# Patient Record
Sex: FEMALE | Race: WHITE | NOT HISPANIC OR LATINO | Employment: FULL TIME | URBAN - METROPOLITAN AREA
[De-identification: names, ages, dates, MRNs, and addresses within clinical notes are randomized per-mention and may not be internally consistent; named-entity substitution may affect disease eponyms.]

---

## 2017-02-10 ENCOUNTER — OFFICE VISIT - HEALTHEAST (OUTPATIENT)
Dept: FAMILY MEDICINE | Facility: CLINIC | Age: 32
End: 2017-02-10

## 2017-02-10 DIAGNOSIS — J32.9 SINUSITIS: ICD-10-CM

## 2017-02-10 DIAGNOSIS — H66.92 LEFT OTITIS MEDIA: ICD-10-CM

## 2017-02-10 DIAGNOSIS — R07.0 THROAT PAIN: ICD-10-CM

## 2017-02-10 DIAGNOSIS — R06.02 SOB (SHORTNESS OF BREATH): ICD-10-CM

## 2017-02-10 DIAGNOSIS — R00.0 TACHYCARDIA: ICD-10-CM

## 2017-02-10 DIAGNOSIS — R05.9 COUGH: ICD-10-CM

## 2017-02-10 DIAGNOSIS — J10.1 INFLUENZA B: ICD-10-CM

## 2017-02-12 ENCOUNTER — COMMUNICATION - HEALTHEAST (OUTPATIENT)
Dept: FAMILY MEDICINE | Facility: CLINIC | Age: 32
End: 2017-02-12

## 2017-10-02 ENCOUNTER — OFFICE VISIT - HEALTHEAST (OUTPATIENT)
Dept: FAMILY MEDICINE | Facility: CLINIC | Age: 32
End: 2017-10-02

## 2017-10-02 DIAGNOSIS — K59.00 CONSTIPATION, UNSPECIFIED CONSTIPATION TYPE: ICD-10-CM

## 2017-10-02 DIAGNOSIS — R14.0 ABDOMINAL BLOATING: ICD-10-CM

## 2017-10-02 ASSESSMENT — MIFFLIN-ST. JEOR: SCORE: 2143.3

## 2017-11-24 ENCOUNTER — OFFICE VISIT - HEALTHEAST (OUTPATIENT)
Dept: FAMILY MEDICINE | Facility: CLINIC | Age: 32
End: 2017-11-24

## 2017-11-24 DIAGNOSIS — R10.11 RIGHT UPPER QUADRANT ABDOMINAL PAIN: ICD-10-CM

## 2017-11-24 ASSESSMENT — MIFFLIN-ST. JEOR: SCORE: 2188.2

## 2017-11-26 ENCOUNTER — HOSPITAL ENCOUNTER (OUTPATIENT)
Dept: ULTRASOUND IMAGING | Facility: HOSPITAL | Age: 32
Discharge: HOME | End: 2017-11-26
Attending: NURSE PRACTITIONER

## 2017-11-26 DIAGNOSIS — R10.11 RIGHT UPPER QUADRANT ABDOMINAL PAIN: ICD-10-CM

## 2017-11-28 ENCOUNTER — AMBULATORY - HEALTHEAST (OUTPATIENT)
Dept: FAMILY MEDICINE | Facility: CLINIC | Age: 32
End: 2017-11-28

## 2017-11-28 DIAGNOSIS — Z13.220 LIPID SCREENING: ICD-10-CM

## 2017-11-28 DIAGNOSIS — R10.13 ABDOMINAL PAIN, EPIGASTRIC: ICD-10-CM

## 2018-01-11 ENCOUNTER — RECORDS - HEALTHEAST (OUTPATIENT)
Dept: ADMINISTRATIVE | Facility: OTHER | Age: 33
End: 2018-01-11

## 2018-04-16 ENCOUNTER — OFFICE VISIT - HEALTHEAST (OUTPATIENT)
Dept: FAMILY MEDICINE | Facility: CLINIC | Age: 33
End: 2018-04-16

## 2018-04-16 DIAGNOSIS — M54.16 LUMBAR RADICULOPATHY: ICD-10-CM

## 2018-04-16 ASSESSMENT — MIFFLIN-ST. JEOR: SCORE: 2209.52

## 2018-04-19 ENCOUNTER — THERAPY VISIT (OUTPATIENT)
Dept: PHYSICAL THERAPY | Facility: CLINIC | Age: 33
End: 2018-04-19
Payer: COMMERCIAL

## 2018-04-19 DIAGNOSIS — M54.50 LOW BACK PAIN WITHOUT SCIATICA: Primary | ICD-10-CM

## 2018-04-19 PROCEDURE — 97110 THERAPEUTIC EXERCISES: CPT | Mod: GP | Performed by: PHYSICAL THERAPIST

## 2018-04-19 PROCEDURE — 97112 NEUROMUSCULAR REEDUCATION: CPT | Mod: GP | Performed by: PHYSICAL THERAPIST

## 2018-04-19 PROCEDURE — 97161 PT EVAL LOW COMPLEX 20 MIN: CPT | Mod: GP | Performed by: PHYSICAL THERAPIST

## 2018-04-19 NOTE — MR AVS SNAPSHOT
"              After Visit Summary   4/19/2018    Alicia Smith    MRN: [id number]           Patient Information     Date Of Birth          1985        Visit Information        Provider Department      4/19/2018 4:00 PM Ricco Culver, PT [facility]        Today's Diagnoses     Low back pain without sciatica    -  1       Follow-ups after your visit        Your next 10 appointments already scheduled     Apr 23, 2018 11:40 AM CDT   HENRRY Spine with Patt Newman PTA   [facility]    [address] [facility] [address]   [phone]            May 03, 2018  9:30 AM CDT   HENRRY Spine with Ricco Culver, PT   [facility] ([facility]  )    [address] [facility] [id number] [phone]              Who to contact     If you have questions or need follow up information about today's clinic visit or your schedule please contact [facility] directly at [phone].  Normal or non-critical lab and imaging results will be communicated to you by MyChart, letter or phone within 4 business days after the clinic has received the results. If you do not hear from us within 7 days, please contact the clinic through FIELDS CHINAhart or phone. If you have a critical or abnormal lab result, we will notify you by phone as soon as possible.  Submit refill requests through Zhou Heiya or call your pharmacy and they will forward the refill request to us. Please allow 3 business days for your refill to be completed.          Additional Information About Your Visit        MyChart Information     Zhou Heiya lets you send messages to your doctor, view your test results, renew your prescriptions, schedule appointments and more. To sign up, go to [email or web address] . Click on \"Log in\" on the left side of the screen, which will take you to the Welcome page. " "Then click on \"Sign up Now\" on the right side of the page.     You will be asked to enter the access code listed below, as well as some personal information. Please follow the directions to create your username and password.     Your access code is: [email or web address]  Expires: 2018 11:56 PM     Your access code will  in 90 days. If you need help or a new code, please call your [facility] clinic or [phone].        Care EveryWhere ID     This is your Care EveryWhere ID. This could be used by other organizations to access your [facility] medical records  [id number]         Blood Pressure from Last 3 Encounters:   11 126/88   11 157/101    Weight from Last 3 Encounters:   11 (!) 141.1 kg (311 lb)   11 (!) 140.5 kg (309 lb 12.8 oz)              We Performed the Following     HENRRY Inital Eval Report     Neuromuscular Re-Education     PT Eval, Low Complexity (87444)     Therapeutic Exercises        Primary Care Provider Office Phone # Fax #    Rosa EDOUARD Bentley [phone]       [facility] [address]  [place] [address]        Equal Access to Services     MAGDI STOVALL AH: Hadii leonora ku hadasho Soomaali, waaxda luqadaha, qaybta kaalmada adeegyada, bonnie delgado. So [facility] [phone].    ATENCIÓN: Si habla español, tiene a yousif disposición servicios gratuitos de asistencia lingüística. Llame al [phone].    We comply with applicable federal civil rights laws and [place] laws. We do not discriminate on the basis of race, color, national origin, age, disability, sex, sexual orientation, or gender identity.            Thank you!     Thank you for choosing [facility]  for your care. Our goal is always to provide you with excellent care. Hearing back from our patients is one way we can continue to improve our services. Please take a few minutes to complete the written survey that you may receive in the mail " after your visit with us. Thank you!             Your Updated Medication List - Protect others around you: Learn how to safely use, store and throw away your medicines at www.disposemymeds.org.          This list is accurate as of 4/19/18 11:59 PM.  Always use your most recent med list.                   Brand Name Dispense Instructions for use Diagnosis    azithromycin 250 MG tablet    ZITHROMAX    6 tablet    Take  by mouth. Two tablets first day, then one tablet daily for four days    Simple chronic bronchitis (H)       chlorpheniramine-HYDROcodone 10-8 MG/5ML Lqcr suspension    TUSSIONEX    120 mL    Take 5 mLs by mouth every 12 hours as needed.    Cough with sputum       CLARITIN PO      Take  by mouth.        predniSONE 20 MG tablet    DELTASONE    13 tablet    Take 1 tablet by mouth daily.    Cough with sputum       SUDAFED COUGH OR      Take  by mouth.

## 2018-04-19 NOTE — PROGRESS NOTES
[facility] Initial Evaluation  Subjective:  Patient is a 32 year old female presenting with rehab back hpi.   Alicia Smith is a 32 year old female with a lumbar condition.  Occurance: SITTING DOWN   Condition occurred: at home.  This is a recurrent and new condition  4/12/2018.    Patient reports pain:  Lumbar spine right.  Radiates to:  Gluteals right and thigh right.  Pain is described as burning and aching and is constant and reported as 8/10.  Associated symptoms:  Loss of motion/stiffness and loss of strength. Pain is the same all the time.  Symptoms are exacerbated by bending, twisting, lifting and sitting (ASCENDING STAIRS. TRANSFERS.) and relieved by activity/movement, rest and ice.  Since onset symptoms are gradually improving.  Special testing: FEB. 2016: L3-4.   Previous treatment includes chiropractic.  There was moderate improvement following previous treatment.  General health as reported by patient is good.  Pertinent medical history includes:  Overweight and history of fractures.  Medical allergies: no.  Other surgeries include:  No.  Current medications:  Anti-inflammatory and pain medication.  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  Primary job tasks include:  Prolonged standing, prolonged sitting and lifting (COMPUTER, PUSH/PULL. ).    Barriers: DONNING SOCKS.    Red flags:  None as reported by the patient.                        Objective:  System    Physical Exam    General     ROS    Assessment/Plan:    {REHAB NOTES:731341}

## 2018-04-19 NOTE — LETTER
[facility]  [address]  [facility] [address]  [phone]    2018    Re: Alicia Smith   :   1985  MRN:  [id number]   REFERRING PHYSICIAN:   Rosa Bentley  [facility]  Date of Initial Evaluation: 2018  Visits:  Rxs Used: 1  Reason for Referral:  Low back pain without sciatica  EVALUATION SUMMARY  [facility]  Subjective:  Patient is a 32 year old female presenting with rehab back hpi.   Alicia Smith is a 32 year old female with a lumbar condition.  Occurance: SITTING DOWN   Condition occurred: at home.  This is a recurrent and new condition  2018.    Patient reports pain:  Lumbar spine right.  Radiates to:  Gluteals right and thigh right.  Pain is described as burning and aching and is constant and reported as 8/10.  Associated symptoms:  Loss of motion/stiffness and loss of strength. Pain is the same all the time.  Symptoms are exacerbated by bending, twisting, lifting and sitting (ASCENDING STAIRS. TRANSFERS.) and relieved by activity/movement, rest and ice.  Since onset symptoms are gradually improving.  Special testing: FEB. 2016: L3-4.   Previous treatment includes chiropractic.  There was moderate improvement following previous treatment.  General health as reported by patient is good.  Pertinent medical history includes:  Overweight and history of fractures.  Medical allergies: no.  Other surgeries include:  No.  Current medications:  Anti-inflammatory and pain medication.  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  Primary job tasks include:  Prolonged standing, prolonged sitting and lifting (COMPUTER, PUSH/PULL. ).  Barriers: DONNING SOCKS.  Red flags:  None as reported by the patient.           Objective:  System       Lumbar/SI Evaluation    Lumbar Myotomes:    T12-L3 (Hip Flex):  Left: 5    Right:  5  L2-4 (Quads):  Left:  5    Right:  5  L4 (Ankle DF):  Left:  5    Right:  5  L5 (Great Toe Ext): Left: 5    Right: 5   S1 (Toe Raise):  Left: 5    Right: 5  Lumbar DTR's:  normal  Gennaro Lumbar Evaluation  Posture:  Sitting: good  Correction of Posture: better  Movement Loss:  Flexion (Flex): pain and min  Side [place]gan R (SG R): pain and nil  Side Glide L (SG L): nil  Test Movements:  FIS:   Repeat FIS: During: increases    EIS: During: centralizing    Repeat EIS: During: centralizing    EIL:   Repeat EIL: During: peripheralizing      Static Tests:  Lying Prone in Extension: CENTRALIZING  Conclusion: derangement   Assessment/Plan:    Patient is a 32 year old female with lumbar complaints.    Patient has the following significant findings with corresponding treatment plan.                Diagnosis 1:  LOW BACK PAIN  Pain -  hot/cold therapy, US, electric stimulation, manual therapy, splint/taping/bracing/orthotics, self management, education, directional preference exercise and home program  Decreased ROM/flexibility - manual therapy, therapeutic exercise, therapeutic activity and home program  Decreased function - therapeutic activities and home program  Impaired posture - neuro re-education, therapeutic activities and home program    Therapy Evaluation Codes:   1) History comprised of:   Personal factors that impact the plan of care:      Past/current experiences.    Comorbidity factors that impact the plan of care are:      None.     Medications impacting care: Anti-inflammatory and Pain.  2) Examination of Body Systems comprised of:   Body structures and functions that impact the plan of care:      Lumbar spine.   Activity limitations that impact the plan of care are:      Bathing, Bending, Cooking, Driving, Dressing, Lifting, Sitting, Squatting/kneeling, Standing and Working.  3) Clinical presentation characteristics are:   Stable/Uncomplicated.  4) Decision-Making    Low complexity using standardized patient  assessment instrument and/or measureable assessment of functional outcome.  Cumulative Therapy Evaluation is: Low complexity.    Previous and current functional limitations:  (See Goal Flow Sheet for this information)    Short term and Long term goals: (See Goal Flow Sheet for this information)     Communication ability:  Patient appears to be able to clearly communicate and understand verbal and written communication and follow directions correctly.  Treatment Explanation - The following has been discussed with the patient:   RX ordered/plan of care  Anticipated outcomes  Possible risks and side effects  This patient would benefit from PT intervention to resume normal activities.   Rehab potential is good.    Frequency:  1 X week, once daily  Duration:  for 6 weeks  Discharge Plan:  Achieve all LTG.  Independent in home treatment program.  Reach maximal therapeutic benefit.    Please refer to the daily flowsheet for treatment today, total treatment time and time spent performing 1:1 timed codes.     Thank you for your referral.    INQUIRIES  Therapist: Isael Culver, PT  [facility]  [address]  [place] [address]  Phone: [phone]  Fax: [phone]

## 2018-04-20 ENCOUNTER — OFFICE VISIT - HEALTHEAST (OUTPATIENT)
Dept: FAMILY MEDICINE | Facility: CLINIC | Age: 33
End: 2018-04-20

## 2018-04-20 DIAGNOSIS — R21 RASH: ICD-10-CM

## 2018-04-20 DIAGNOSIS — M54.16 LUMBAR RADICULOPATHY: ICD-10-CM

## 2018-04-20 DIAGNOSIS — Z13.220 LIPID SCREENING: ICD-10-CM

## 2018-04-20 DIAGNOSIS — R53.83 FATIGUE, UNSPECIFIED TYPE: ICD-10-CM

## 2018-04-20 LAB
CHOLEST SERPL-MCNC: 197 MG/DL
FASTING STATUS PATIENT QL REPORTED: YES
HDLC SERPL-MCNC: 55 MG/DL
LDLC SERPL CALC-MCNC: 119 MG/DL
TRIGL SERPL-MCNC: 115 MG/DL
TSH SERPL DL<=0.005 MIU/L-ACNC: 2.96 UIU/ML (ref 0.3–5)
VIT B12 SERPL-MCNC: 383 PG/ML (ref 213–816)

## 2018-04-20 ASSESSMENT — MIFFLIN-ST. JEOR: SCORE: 2183.67

## 2018-04-21 PROBLEM — M54.50 LOW BACK PAIN WITHOUT SCIATICA: Status: ACTIVE | Noted: 2018-04-21

## 2018-04-22 NOTE — PROGRESS NOTES
[facility] Initial Evaluation  Subjective:  Patient is a 32 year old female presenting with rehab back hpi.   Alicia Smith is a 32 year old female with a lumbar condition.  Occurance: SITTING DOWN   Condition occurred: at home.  This is a recurrent and new condition  4/12/2018.    Patient reports pain:  Lumbar spine right.  Radiates to:  Gluteals right and thigh right.  Pain is described as burning and aching and is constant and reported as 8/10.  Associated symptoms:  Loss of motion/stiffness and loss of strength. Pain is the same all the time.  Symptoms are exacerbated by bending, twisting, lifting and sitting (ASCENDING STAIRS. TRANSFERS.) and relieved by activity/movement, rest and ice.  Since onset symptoms are gradually improving.  Special testing: FEB. 2016: L3-4.   Previous treatment includes chiropractic.  There was moderate improvement following previous treatment.  General health as reported by patient is good.  Pertinent medical history includes:  Overweight and history of fractures.  Medical allergies: no.  Other surgeries include:  No.  Current medications:  Anti-inflammatory and pain medication.  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  Primary job tasks include:  Prolonged standing, prolonged sitting and lifting (COMPUTER, PUSH/PULL. ).    Barriers: DONNING SOCKS.    Red flags:  None as reported by the patient.                        Objective:  System         Lumbar/SI Evaluation    Lumbar Myotomes:    T12-L3 (Hip Flex):  Left: 5    Right: 5  L2-4 (Quads):  Left:  5    Right:  5  L4 (Ankle DF):  Left:  5    Right:  5  L5 (Great Toe Ext): Left: 5    Right: 5   S1 (Toe Raise):  Left: 5    Right: 5  Lumbar DTR's:  normal                                                                   Gennaro Lumbar Evaluation    Posture:  Sitting: good        Correction of Posture: better    Movement Loss:  Flexion (Flex): pain and  min    Side [place] R (SG R): pain and nil  Side Glide L (SG L): nil  Test Movements:  FIS:   Repeat FIS: During: increases    EIS: During: centralizing    Repeat EIS: During: centralizing      EIL:   Repeat EIL: During: peripheralizing        Static Tests:          Lying Prone in Extension: CENTRALIZING    Conclusion: derangement                                         ROS    Assessment/Plan:    Patient is a 32 year old female with lumbar complaints.    Patient has the following significant findings with corresponding treatment plan.                Diagnosis 1:  LOW BACK PAIN  Pain -  hot/cold therapy, US, electric stimulation, manual therapy, splint/taping/bracing/orthotics, self management, education, directional preference exercise and home program  Decreased ROM/flexibility - manual therapy, therapeutic exercise, therapeutic activity and home program  Decreased function - therapeutic activities and home program  Impaired posture - neuro re-education, therapeutic activities and home program    Therapy Evaluation Codes:   1) History comprised of:   Personal factors that impact the plan of care:      Past/current experiences.    Comorbidity factors that impact the plan of care are:      None.     Medications impacting care: Anti-inflammatory and Pain.  2) Examination of Body Systems comprised of:   Body structures and functions that impact the plan of care:      Lumbar spine.   Activity limitations that impact the plan of care are:      Bathing, Bending, Cooking, Driving, Dressing, Lifting, Sitting, Squatting/kneeling, Standing and Working.  3) Clinical presentation characteristics are:   Stable/Uncomplicated.  4) Decision-Making    Low complexity using standardized patient assessment instrument and/or measureable assessment of functional outcome.  Cumulative Therapy Evaluation is: Low complexity.    Previous and current functional limitations:  (See Goal Flow Sheet for this information)    Short term and Long term  goals: (See Goal Flow Sheet for this information)     Communication ability:  Patient appears to be able to clearly communicate and understand verbal and written communication and follow directions correctly.  Treatment Explanation - The following has been discussed with the patient:   RX ordered/plan of care  Anticipated outcomes  Possible risks and side effects  This patient would benefit from PT intervention to resume normal activities.   Rehab potential is good.    Frequency:  1 X week, once daily  Duration:  for 6 weeks  Discharge Plan:  Achieve all LTG.  Independent in home treatment program.  Reach maximal therapeutic benefit.    Please refer to the daily flowsheet for treatment today, total treatment time and time spent performing 1:1 timed codes.

## 2018-04-23 ENCOUNTER — THERAPY VISIT (OUTPATIENT)
Dept: PHYSICAL THERAPY | Facility: CLINIC | Age: 33
End: 2018-04-23
Payer: COMMERCIAL

## 2018-04-23 ENCOUNTER — AMBULATORY - HEALTHEAST (OUTPATIENT)
Dept: FAMILY MEDICINE | Facility: CLINIC | Age: 33
End: 2018-04-23

## 2018-04-23 DIAGNOSIS — M54.50 LOW BACK PAIN WITHOUT SCIATICA: ICD-10-CM

## 2018-04-23 DIAGNOSIS — E55.9 VITAMIN D DEFICIENCY: ICD-10-CM

## 2018-04-23 LAB
25(OH)D3 SERPL-MCNC: 18.2 NG/ML (ref 30–80)
25(OH)D3 SERPL-MCNC: 18.2 NG/ML (ref 30–80)
ANA SER QL: 0.4 U

## 2018-04-23 PROCEDURE — 97112 NEUROMUSCULAR REEDUCATION: CPT | Mod: GP | Performed by: PHYSICAL THERAPY ASSISTANT

## 2018-04-23 PROCEDURE — 97110 THERAPEUTIC EXERCISES: CPT | Mod: GP | Performed by: PHYSICAL THERAPY ASSISTANT

## 2018-04-27 ENCOUNTER — RECORDS - HEALTHEAST (OUTPATIENT)
Dept: ADMINISTRATIVE | Facility: OTHER | Age: 33
End: 2018-04-27

## 2018-05-03 ENCOUNTER — THERAPY VISIT (OUTPATIENT)
Dept: PHYSICAL THERAPY | Facility: CLINIC | Age: 33
End: 2018-05-03
Payer: COMMERCIAL

## 2018-05-03 DIAGNOSIS — M54.50 LOW BACK PAIN WITHOUT SCIATICA: ICD-10-CM

## 2018-05-03 PROCEDURE — 97530 THERAPEUTIC ACTIVITIES: CPT | Mod: GP | Performed by: PHYSICAL THERAPIST

## 2018-05-03 PROCEDURE — 97112 NEUROMUSCULAR REEDUCATION: CPT | Mod: GP | Performed by: PHYSICAL THERAPIST

## 2018-05-03 NOTE — LETTER
[facility]  [address]  [facility] [address]  [phone]    May 8, 2018    Re: Alicia Smith   :   1985  MRN:  [id number]   REFERRING PHYSICIAN:   Rosa Bentley  [facility]  Date of Initial Evaluation:  2018  Reason for Referral:  Low back pain without sciatica  EVALUATION SUMMARY     DISCHARGE REPORT  Progress reporting period is from 2018 to 5/3/2018.     SUBJECTIVE  Subjective: ONLY INTERMITTENT PAIN NOW.  EVERYTHING IS EASIER.    Current Pain level: 0/10   Initial Pain level: 8/10   Subjective:  HPI  Oswestry Score: 0 %                The subjective and objective information are from the last SOAP note on this patient.    OBJECTIVE  Objective: FIS: TO ANKLES PAIN FREE.       ASSESSMENT/PLAN  Updated problem list and treatment plan: Diagnosis 1:  LOW BACK PAIN  DOING WELL.   STG/LTGs have been met or progress has been made towards goals:  Yes (See Goal flow sheet completed today.)  Assessment of Progress: The patient's condition is improving.  The patient has met all of their long term goals.  Self Management Plans:  Patient has been instructed in a home treatment program.  I have re-evaluated this patient and find that the nature, scope, duration and intensity of the therapy is appropriate for the medical condition of the patient.  Alicia continues to require the following intervention to meet STG and LTG's: PT intervention is no longer required to meet STG/LTG.  We will discharge this patient from PT.    Recommendations:  This patient is ready to be discharged from therapy and continue their home treatment program.2  Please refer to the daily flowsheet for treatment today, total treatment time and time spent performing 1:1 timed codes.      Thank you for your referral.    INQUIRIES  Therapist: Isael Culver, [facility]  [address]  [place]  [address]  Phone: [phone]  Fax: [phone]

## 2018-05-03 NOTE — MR AVS SNAPSHOT
"              After Visit Summary   5/3/2018    Alicia Smith    MRN: [id number]           Patient Information     Date Of Birth          1985        Visit Information        Provider Department      5/3/2018 9:30 AM Ricco Culver PT [facility]        Today's Diagnoses     Low back pain without sciatica           Follow-ups after your visit        Who to contact     If you have questions or need follow up information about today's clinic visit or your schedule please contact [facility] directly at [phone].  Normal or non-critical lab and imaging results will be communicated to you by Docuratedhart, letter or phone within 4 business days after the clinic has received the results. If you do not hear from us within 7 days, please contact the clinic through Docuratedhart or phone. If you have a critical or abnormal lab result, we will notify you by phone as soon as possible.  Submit refill requests through [email or web address] or call your pharmacy and they will forward the refill request to us. Please allow 3 business days for your refill to be completed.          Additional Information About Your Visit        MyChart Information     [email or web address] lets you send messages to your doctor, view your test results, renew your prescriptions, schedule appointments and more. To sign up, go to [email or web address] . Click on \"Log in\" on the left side of the screen, which will take you to the Welcome page. Then click on \"Sign up Now\" on the right side of the page.     You will be asked to enter the access code listed below, as well as some personal information. Please follow the directions to create your username and password.     Your access code is: [email or web address]  Expires: 2018 11:56 PM     Your access code will  in 90 days. If you need help or a new code, please call your [facility] clinic or [phone].        Care EveryWhere ID     This is your Care EveryWhere " ID. This could be used by other organizations to access your [facility] medical records  [id number]         Blood Pressure from Last 3 Encounters:   08/30/11 126/88   08/21/11 157/101    Weight from Last 3 Encounters:   08/30/11 (!) 141.1 kg (311 lb)   08/21/11 (!) 140.5 kg (309 lb 12.8 oz)              We Performed the Following     HENRRY Progress Notes Report     Neuromuscular Re-Education     Therapeutic Activities        Primary Care Provider Office Phone # Fax #    Rosa Bentley [phone]       [facility] [address]  [facility] [address]        Equal Access to Services     RAUL STOVALL : Hadii aad ku hadasho Soomaali, waaxda luqadaha, qaybta kaalmada adeegyada, waxrasheed cobos . So [facility] [phone].    ATENCIÓN: Si habla español, tiene a yousif disposición servicios gratuitos de asistencia lingüística. [facility] [phone].    We comply with applicable federal civil rights laws and [place] laws. We do not discriminate on the basis of race, color, national origin, age, disability, sex, sexual orientation, or gender identity.            Thank you!     Thank you for choosing [facility]  for your care. Our goal is always to provide you with excellent care. Hearing back from our patients is one way we can continue to improve our services. Please take a few minutes to complete the written survey that you may receive in the mail after your visit with us. Thank you!             Your Updated Medication List - Protect others around you: Learn how to safely use, store and throw away your medicines at www.disposemymeds.org.          This list is accurate as of 5/3/18 11:59 PM.  Always use your most recent med list.                   Brand Name Dispense Instructions for use Diagnosis    azithromycin 250 MG tablet    ZITHROMAX    6 tablet    Take  by mouth. Two tablets first day, then one tablet daily for four days    Simple chronic  bronchitis (H)       chlorpheniramine-HYDROcodone 10-8 MG/5ML Lqcr suspension    TUSSIONEX    120 mL    Take 5 mLs by mouth every 12 hours as needed.    Cough with sputum       CLARITIN PO      Take  by mouth.        predniSONE 20 MG tablet    DELTASONE    13 tablet    Take 1 tablet by mouth daily.    Cough with sputum       SUDAFED COUGH OR      Take  by mouth.

## 2018-05-04 ENCOUNTER — RECORDS - HEALTHEAST (OUTPATIENT)
Dept: ADMINISTRATIVE | Facility: OTHER | Age: 33
End: 2018-05-04

## 2018-05-07 PROBLEM — M54.50 LOW BACK PAIN WITHOUT SCIATICA: Status: RESOLVED | Noted: 2018-04-21 | Resolved: 2018-05-07

## 2018-05-07 NOTE — PROGRESS NOTES
Subjective:  HPI  Oswestry Score: 0 %                 Objective:  System    Physical Exam    General     ROS    Assessment/Plan:    DISCHARGE REPORT    Progress reporting period is from 4/2/2018 to 5/3/2018.     SUBJECTIVE  Subjective: ONLY INTERMITTENT PAIN NOW.  EVERYTHING IS EASIER.    Current Pain level: 0/10   Initial Pain level: 8/10        ;   ,     The subjective and objective information are from the last SOAP note on this patient.    OBJECTIVE  Objective: FIS: TO ANKLES PAIN FREE.       ASSESSMENT/PLAN  Updated problem list and treatment plan: Diagnosis 1:  LOW BACK PAIN  DOING WELL.   STG/LTGs have been met or progress has been made towards goals:  Yes (See Goal flow sheet completed today.)  Assessment of Progress: The patient's condition is improving.  The patient has met all of their long term goals.  Self Management Plans:  Patient has been instructed in a home treatment program.  I have re-evaluated this patient and find that the nature, scope, duration and intensity of the therapy is appropriate for the medical condition of the patient.  Alicia continues to require the following intervention to meet STG and LTG's: PT intervention is no longer required to meet STG/LTG.  We will discharge this patient from PT.    Recommendations:  This patient is ready to be discharged from therapy and continue their home treatment program.2    Please refer to the daily flowsheet for treatment today, total treatment time and time spent performing 1:1 timed codes.

## 2018-05-10 ENCOUNTER — RECORDS - HEALTHEAST (OUTPATIENT)
Dept: ADMINISTRATIVE | Facility: OTHER | Age: 33
End: 2018-05-10

## 2018-08-29 ENCOUNTER — OFFICE VISIT (OUTPATIENT)
Dept: URGENT CARE | Facility: URGENT CARE | Age: 33
End: 2018-08-29
Payer: COMMERCIAL

## 2018-08-29 VITALS
HEART RATE: 94 BPM | TEMPERATURE: 98.2 F | RESPIRATION RATE: 16 BRPM | BODY MASS INDEX: 57.57 KG/M2 | WEIGHT: 293 LBS | DIASTOLIC BLOOD PRESSURE: 88 MMHG | OXYGEN SATURATION: 96 % | SYSTOLIC BLOOD PRESSURE: 138 MMHG

## 2018-08-29 DIAGNOSIS — H60.391 INFECTIVE OTITIS EXTERNA, RIGHT: Primary | ICD-10-CM

## 2018-08-29 PROCEDURE — 99203 OFFICE O/P NEW LOW 30 MIN: CPT | Performed by: FAMILY MEDICINE

## 2018-08-29 RX ORDER — NEOMYCIN SULFATE, POLYMYXIN B SULFATE AND HYDROCORTISONE 10; 3.5; 1 MG/ML; MG/ML; [USP'U]/ML
4 SUSPENSION/ DROPS AURICULAR (OTIC) 4 TIMES DAILY
Qty: 10 ML | Refills: 0 | Status: SHIPPED | OUTPATIENT
Start: 2018-08-29 | End: 2021-07-08

## 2018-08-29 RX ORDER — CHOLECALCIFEROL (VITAMIN D3) 50 MCG
1 TABLET ORAL DAILY
COMMUNITY

## 2018-08-29 ASSESSMENT — PAIN SCALES - GENERAL: PAINLEVEL: MILD PAIN (2)

## 2018-08-29 NOTE — MR AVS SNAPSHOT
After Visit Summary   8/29/2018    Alicia Smith    MRN: [id number]           Patient Information     Date Of Birth          1985        Visit Information        Provider Department      8/29/2018 11:05 AM Nakia Godinez MD [facility]        Today's Diagnoses     Infective otitis externa, right    -  1      Care Instructions      External Ear Infection (Adult)    External otitis (also called  swimmer s ear ) is an infection in the ear canal. It is often caused by bacteria or fungus. It can occur a few days after water gets trapped in the ear canal (from swimming or bathing). It can also occur after cleaning too deeply in the ear canal with a cotton swab or other object. Sometimes, hair care products get into the ear canal and cause this problem.  Symptoms can include pain, fever, itching, redness, drainage, or swelling of the ear canal. Temporary hearing loss may also occur.  Home care    Do not try to clean the ear canal. This can push pus and bacteria deeper into the canal.    Use prescribed ear drops as directed. These help reduce swelling and fight the infection. If an ear wick was placed in the ear canal, apply drops right onto the end of the wick. The wick will draw the medicine into the ear canal even if it is swollen closed.    A cotton ball may be loosely placed in the outer ear to absorb any drainage.    You may use acetaminophen or ibuprofen to control pain, unless another medicine was prescribed. Note: If you have chronic liver or kidney disease or ever had a stomach ulcer or GI bleeding, talk to your healthcare provider before taking any of these medicines.    Do not allow water to get into your ear when bathing. Also, don't swim until the infection has cleared.  Prevention    Keep your ears dry. This helps lower the risk of infection. Dry your ears with a towel or hair dryer after getting wet. Also, use ear plugs when swimming.    Do not stick any objects in  the ear to remove wax.    If you feel water trapped in your ear, use ear drops right away. You can get these drops over the counter at most drugstores. They work by removing water from the ear canal.  Follow-up care  Follow up with your healthcare provider in 1 week, or as advised.  When to seek medical advice  Call your healthcare provider right away if any of these occur:    Ear pain becomes worse or doesn t improve after 3 days of treatment    Redness or swelling of the outer ear occurs or gets worse    Headache    Painful or stiff neck    Drowsiness or confusion    Fever of 100.4 F (38 C) or higher, or as directed by your healthcare provider    Seizure  Date Last Reviewed: 10/1/2017    2006-1600 [employer]. [address] [place] [address], [place] [id number]. All rights reserved. This information is not intended as a substitute for professional medical care. Always follow your healthcare professional's instructions.                Follow-ups after your visit        Who to contact     If you have questions or need follow up information about today's clinic visit or your schedule please contact [facility] directly at [phone].  Normal or non-critical lab and imaging results will be communicated to you by [employer], letter or phone within 4 business days after the clinic has received the results. If you do not hear from us within 7 days, please contact the clinic through [employer] or phone. If you have a critical or abnormal lab result, we will notify you by phone as soon as possible.  Submit refill requests through "[employer]" or call your pharmacy and they will forward the refill request to us. Please allow 3 business days for your refill to be completed.          Additional Information About Your Visit        "[employer]" Information     "[employer]" lets you send messages to your doctor, view your test results, renew your prescriptions, schedule appointments and more. To sign up, go to  [email or web address] . Click on \"Log in\" on the left side of the screen, which will take you to the Welcome page. Then click on \"Sign up Now\" on the right side of the page.     You will be asked to enter the access code listed below, as well as some personal information. Please follow the directions to create your username and password.     Your access code is: [email or web address]  Expires: 2018 11:51 AM     Your access code will  in 90 days. If you need help or a new code, please call your [place] clinic or [phone].        Care EveryWhere ID     This is your Care EveryWhere ID. This could be used by other organizations to access your [place] medical records  [id number]        Your Vitals Were     Pulse Temperature Respirations Last Period Pulse Oximetry BMI (Body Mass Index)    94 98.2  F (36.8  C) (Oral) 16 2018 (Exact Date) 96% 57.57 kg/m2       Blood Pressure from Last 3 Encounters:   18 138/88   11 126/88   11 157/101    Weight from Last 3 Encounters:   18 (!) 338 lb (153.3 kg)   11 (!) 311 lb (141.1 kg)   11 (!) 309 lb 12.8 oz (140.5 kg)              Today, you had the following     No orders found for display         Today's Medication Changes          These changes are accurate as of 18 11:51 AM.  If you have any questions, ask your nurse or doctor.               Start taking these medicines.        Dose/Directions    neomycin-polymyxin-hydrocortisone 3.5-56329-0 otic suspension   Commonly known as:  CORTISPORIN   Used for:  Infective otitis externa, right   Started by:  Nakia Godinez MD        Dose:  4 drop   Place 4 drops into the right ear 4 times daily   Quantity:  10 mL   Refills:  0            Where to get your medicines      These medications were [place] [facility] [place] [address]  [address], [place] [address]     Phone:  [phone]     neomycin-polymyxin-hydrocortisone " 3.5-94026-1 otic suspension                Primary Care Provider Office Phone # Fax #    Rosa Bentley [phone]       [facility] [address] [facility] [address]  [facility] [address]        Equal Access to Services     MAGDI STOVALL : Hadii leonora mcqueen hadcalio Soomaali, waaxda luqadaha, qaybta kaalmada adeegyada, waxrasheed carolin hayaan gracesarah partida chandrika delgado. So [facility] [phone].    ATENCIÓN: Si habla español, tiene a yousif disposición servicios gratuitos de asistencia lingüística. Llame al [phone].    We comply with applicable federal civil rights laws and [place] laws. We do not discriminate on the basis of race, color, national origin, age, disability, sex, sexual orientation, or gender identity.            Thank you!     Thank you for choosing [facility]  for your care. Our goal is always to provide you with excellent care. Hearing back from our patients is one way we can continue to improve our services. Please take a few minutes to complete the written survey that you may receive in the mail after your visit with us. Thank you!             Your Updated Medication List - Protect others around you: Learn how to safely use, store and throw away your medicines at www.disposemymeds.org.          This list is accurate as of 8/29/18 11:51 AM.  Always use your most recent med list.                   Brand Name Dispense Instructions for use Diagnosis    azithromycin 250 MG tablet    ZITHROMAX    6 tablet    Take  by mouth. Two tablets first day, then one tablet daily for four days    Simple chronic bronchitis (H)       chlorpheniramine-HYDROcodone 10-8 MG/5ML Lqcr suspension    TUSSIONEX    120 mL    Take 5 mLs by mouth every 12 hours as needed.    Cough with sputum       CLARITIN PO      Take  by mouth.        neomycin-polymyxin-hydrocortisone 3.5-90329-1 otic suspension    CORTISPORIN    10 mL    Place 4 drops into the right ear 4 times daily    Infective otitis externa, right        predniSONE 20 MG tablet    DELTASONE    13 tablet    Take 1 tablet by mouth daily.    Cough with sputum       SUDAFED COUGH OR      Take  by mouth.        vitamin D 2000 units tablet      Take 1 tablet by mouth daily

## 2018-08-29 NOTE — PROGRESS NOTES
SUBJECTIVE:  Chief Complaint   Patient presents with     Otalgia     Right ear pain x1 week     Alicia Smith is a 32 year old female who presents with right ear pain, pressure and blockage for 1 week(s).   Severity: mild   2/10  Timing:gradual onset, still present and constant  Additional symptoms include none.      History of recurrent otitis: no    History reviewed. No pertinent past medical history.  Patient Active Problem List   Diagnosis   (none) - all problems resolved or deleted       ALLERGIES:  Review of patient's allergies indicates no known allergies.      Current Outpatient Prescriptions on File Prior to Visit:  azithromycin (ZITHROMAX) 250 MG tablet Take  by mouth. Two tablets first day, then one tablet daily for four days (Patient not taking: Reported on 8/29/2018)   chlorpheniramine-hydrocodone (TUSSIONEX) 10-8 MG/5ML LQCR Take 5 mLs by mouth every 12 hours as needed. (Patient not taking: Reported on 8/29/2018)   Loratadine (CLARITIN PO) Take  by mouth.   predniSONE (DELTASONE) 20 MG tablet Take 1 tablet by mouth daily. (Patient not taking: Reported on 8/29/2018)   Pseudoephedrine-DM-GG (SUDAFED COUGH OR) Take  by mouth.     No current facility-administered medications on file prior to visit.     Social History   Substance Use Topics     Smoking status: Never Smoker     Smokeless tobacco: Never Used     Alcohol use Yes      Comment: rarely       History reviewed. No pertinent family history.      ROS:   CONSTITUTIONAL:NEGATIVE for fever, chills,   INTEGUMENTARY/SKIN: NEGATIVE for worrisome rashes,   EYES: NEGATIVE for vision changes or irritation  RESP:NEGATIVE for significant cough or SOB    OBJECTIVE:  /88 (BP Location: Left arm, Patient Position: Chair, Cuff Size: Adult Large)  Pulse 94  Temp 98.2  F (36.8  C) (Oral)  Resp 16  Wt (!) 338 lb (153.3 kg)  LMP 08/14/2018 (Exact Date)  SpO2 96%  BMI 57.57 kg/m2   EXAM:  The right TM is normal: no effusions, no erythema, and normal landmarks      The right auditory canal is obstructed by drainage, swollen and tender  The left TM is normal: no effusions, no erythema, and normal landmarks  The left auditory canal is normal and without drainage, edema or erythema  Oropharynx exam is normal: no lesions, erythema, adenopathy or exudate.  GENERAL: no acute distress  EYES: EOMI,  PERRL, conjunctiva clear  NECK: supple, non-tender to palpation, no adenopathy noted  RESP: lungs clear to auscultation - no rales, rhonchi or wheezes  CV: regular rates and rhythm, normal S1 S2, no murmur noted  SKIN: no suspicious lesions or rashes     ASSESSMENT/ PLAN  Infective otitis externa, right     - neomycin-polymyxin-hydrocortisone (CORTISPORIN) 3.5-36776-9 otic suspension; Place 4 drops into the right ear 4 times daily     Symptomatic relief of pain and fever with acetaminophen and/or ibuprofen   May apply a warm pack to the region of the ear for symptomatic relief

## 2019-01-31 ENCOUNTER — OFFICE VISIT - HEALTHEAST (OUTPATIENT)
Dept: FAMILY MEDICINE | Facility: CLINIC | Age: 34
End: 2019-01-31

## 2019-01-31 DIAGNOSIS — H66.002 ACUTE SUPPURATIVE OTITIS MEDIA OF LEFT EAR WITHOUT SPONTANEOUS RUPTURE OF TYMPANIC MEMBRANE, RECURRENCE NOT SPECIFIED: ICD-10-CM

## 2019-01-31 DIAGNOSIS — R21 RASH: ICD-10-CM

## 2019-01-31 ASSESSMENT — MIFFLIN-ST. JEOR: SCORE: 2183.21

## 2019-02-12 ENCOUNTER — COMMUNICATION - HEALTHEAST (OUTPATIENT)
Dept: FAMILY MEDICINE | Facility: CLINIC | Age: 34
End: 2019-02-12

## 2019-03-17 ENCOUNTER — COMMUNICATION - HEALTHEAST (OUTPATIENT)
Dept: FAMILY MEDICINE | Facility: CLINIC | Age: 34
End: 2019-03-17

## 2019-03-18 ENCOUNTER — AMBULATORY - HEALTHEAST (OUTPATIENT)
Dept: FAMILY MEDICINE | Facility: CLINIC | Age: 34
End: 2019-03-18

## 2019-09-20 ENCOUNTER — COMMUNICATION - HEALTHEAST (OUTPATIENT)
Dept: FAMILY MEDICINE | Facility: CLINIC | Age: 34
End: 2019-09-20

## 2020-01-10 ENCOUNTER — OFFICE VISIT - HEALTHEAST (OUTPATIENT)
Dept: FAMILY MEDICINE | Facility: CLINIC | Age: 35
End: 2020-01-10

## 2020-01-10 DIAGNOSIS — R79.82 ELEVATED C-REACTIVE PROTEIN (CRP): ICD-10-CM

## 2020-01-10 DIAGNOSIS — E66.01 MORBID OBESITY (H): ICD-10-CM

## 2020-01-10 DIAGNOSIS — R07.89 ATYPICAL CHEST PAIN: ICD-10-CM

## 2020-01-10 LAB
C REACTIVE PROTEIN LHE: 2.2 MG/DL (ref 0–0.8)
ERYTHROCYTE [DISTWIDTH] IN BLOOD BY AUTOMATED COUNT: 12.1 % (ref 11–14.5)
ERYTHROCYTE [SEDIMENTATION RATE] IN BLOOD BY WESTERGREN METHOD: 33 MM/HR (ref 0–20)
HCT VFR BLD AUTO: 43.9 % (ref 35–47)
HGB BLD-MCNC: 14.8 G/DL (ref 12–16)
MCH RBC QN AUTO: 30.7 PG (ref 27–34)
MCHC RBC AUTO-ENTMCNC: 33.6 G/DL (ref 32–36)
MCV RBC AUTO: 91 FL (ref 80–100)
PLATELET # BLD AUTO: 387 THOU/UL (ref 140–440)
PMV BLD AUTO: 7.2 FL (ref 7–10)
RBC # BLD AUTO: 4.82 MILL/UL (ref 3.8–5.4)
WBC: 13.5 THOU/UL (ref 4–11)

## 2020-01-10 ASSESSMENT — MIFFLIN-ST. JEOR: SCORE: 2204.08

## 2020-01-13 ENCOUNTER — COMMUNICATION - HEALTHEAST (OUTPATIENT)
Dept: FAMILY MEDICINE | Facility: CLINIC | Age: 35
End: 2020-01-13

## 2020-03-17 ENCOUNTER — VIRTUAL VISIT (OUTPATIENT)
Dept: FAMILY MEDICINE | Facility: OTHER | Age: 35
End: 2020-03-17

## 2020-03-17 NOTE — PROGRESS NOTES
"Date: 2020 10:46:12  Clinician: Nisha Cox  Clinician NPI: [id number]  Patient: Alicia Smith  Patient : 1985  Patient Address: [address] [place] [address] [place] [address]  Patient Phone: [phone]  Visit Protocol: URI  Patient Summary:  Alicia is a 34 year old ( : 1985 ) female who initiated a Visit for COVID-19 (Coronavirus) evaluation and screening. When asked the question \"Please sign me up to receive news, health information and promotions from [employer].\", Alicia responded \"No\".    Alicia states her symptoms started 1-2 days ago.   Her symptoms consist of a sore throat, a cough, chills, malaise, enlarged lymph nodes, facial pain or pressure, and myalgia. She is experiencing mild difficulty breathing with activities but can speak normally in full sentences. Alicia also feels feverish.   Symptom details     Cough: Alicia coughs a few times an hour and her cough is not more bothersome at night. Phlegm does not come into her throat when she coughs. She does not believe her cough is caused by post-nasal drip.     Sore throat: Alicia reports having moderate throat pain (4-6 on a 10 point pain scale), does not have exudate on her tonsils, and can swallow liquids. The lymph nodes in her neck are enlarged. A rash has not appeared on the skin since the sore throat started.     Temperature: Her current temperature is 99.5 degrees Fahrenheit.     Facial pain or pressure: The facial pain or pressure feels worse when bending over or leaning forward.      Alicia denies having wheezing, nasal congestion, teeth pain, ear pain, headache, and rhinitis. She also denies taking antibiotic medication for the symptoms and having recent facial or sinus surgery in the past 60 days.   Precipitating events  Within the past week, Alicia has not been exposed to someone with strep throat. She has not recently been exposed to someone with influenza. Alicia has been in close contact with the following high risk " individuals: immunocompromised people, adults 65 or older, and people with asthma, heart disease or diabetes.   Pertinent COVID-19 (Coronavirus) information  Alicia has traveled internationally or to the areas where COVID-19 (Coronavirus) is widespread in the last 14 days before the start of her symptoms. Countries or locations traveled as reported by the patient (free text): [place] 2 stops, [place], [place], was on a cruise   Alicia has not had a close contact with a laboratory-confirmed COVID-19 patient within 14 days of symptom onset. She also has not had a close contact with a suspected COVID-19 patient within 14 days of symptom onset.   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   Pertinent medical history  Alicia had 1 sinus infection within the past year.   Alicia does not get yeast infections when she takes antibiotics.   Alicia does not need a return to work/school note.   Weight: 335 lbs   Alicia does not smoke or use smokeless tobacco.   She denies pregnancy and denies breastfeeding. She has menstruated in the past month.   Weight: 335 lbs  A synchronous phone visit was initiated by the provider for the following reason: breathing    MEDICATIONS: loratadine oral, Sudafed 12 Hour oral, Tums Extra Strength Smoothies oral, ALLERGIES: NKDA  Clinician Response:  Dear Alicia,   Based on the information you have provided, you do have symptoms that are consistent with Coronavirus (COVID-19).  The coronavirus causes mild to severe respiratory illness with the most common symptoms including fever, cough and difficulty breathing. Unfortunately, many viruses cause similar symptoms and it can be difficult to distinguish between viruses, especially in mild cases, so we are presuming that anyone with cough or fever has coronavirus at this time.  Coronavirus/COVID-19 has reached the point of community spread in [place], meaning that we are finding the virus in people with no known exposure  risk for alexi the virus. Given the increasing commonness of coronavirus in the community we are no longer testing patients who are not critically ill.  For everyone else who has cough or fever, you should assume you are infected with coronavirus. Accordingly, you should self-quarantine for fourteen days from the first day your symptoms started. You should call if you find increasing shortness of breath, wheezing or sustained fever above 101.5. If you are significantly short of breath or experience chest pain you should call 911 or report to the nearest emergency department for urgent evaluation.    Isolate yourself at home.   Do Not allow any visitors  Do Not go to work or school  Do Not go to [employer],  centers, shopping, or other public places.  Do Not shake hands.  Avoid close contact with others (hugging, kissing).   Protect Others:    Cover Your Mouth and Nose with a mask, disposable tissue or wash cloth to avoid spreading germs to others.  Wash your hands and face frequently with soap and water.   If you develop significant shortness of breath that prevents you from doing normal activities, please call 911 or proceed to the nearest emergency room and alert them immediately that you have been in self-isolation for possible coronavirus.   For more information about COVID19 and options for caring for yourself at home, please visit the CDC website at https://www.cdc.gov/coronavirus/2019-ncov/about/steps-when-sick.htmlFor more options for care at [facility], please visit our website at [email or web address]     Diagnosis: Cough  Diagnosis ICD: R05  Triage Notes: I reviewed the patient's history, verified their identity, and explained the Visit process.    Able to do most ADL's without difficulty. Advised to do [facility] or call if symptoms worsen. ED presentation discussed if emergent. Speaking normally on phone.  Synchronous Triage: phone, status: completed, duration:  147 seconds

## 2020-03-23 ENCOUNTER — OFFICE VISIT (OUTPATIENT)
Dept: URGENT CARE | Facility: URGENT CARE | Age: 35
End: 2020-03-23
Payer: COMMERCIAL

## 2020-03-23 ENCOUNTER — RESULTS ONLY (OUTPATIENT)
Dept: LAB | Age: 35
End: 2020-03-23

## 2020-03-23 DIAGNOSIS — Z20.822 SUSPECTED COVID-19 VIRUS INFECTION: Primary | ICD-10-CM

## 2020-03-23 PROCEDURE — 99213 OFFICE O/P EST LOW 20 MIN: CPT

## 2020-03-23 NOTE — PROGRESS NOTES
Chief Complaint:    COVID-19 evaluation    HPI: Alicia Smith is an 34 year old female who has been triaged via employee [facility] for possible COVID-19 testing.  Patient was not examined in clinic today.      Patient remained in their car during collection process.  Droplet precautions + contact precautions + eye protection were in effect during collection process.  PPE included gown, surgical mask, face shield, and gloves.    Patient Assessment    Patient is stable and in no respiratory distress.  Patient does not appear toxic.    Medical Decision Making:    Patient does meet criteria for COVID testing per [facility].     PLAN:    No results found for any visits on 03/23/20.     Covid-19 NP swab collected.  These will be sent to the [facility] by lab staff.    Patient advised to stay home with mild symptoms and follow home care symptom management.    Instructions Given to Patient    Isolate Yourself:    Isolate yourself while traveling.    Do Not allow any visitors within 6 feet.    Do Not go to work or school.    Do Not go to [employer],  centers, shopping, or other public places.    Do Not shake hands.    Avoid close contact with others (hugging, kissing).    Protect Others:    Cover Your Mouth and Nose with a mask, disposable tissue or wash cloth to avoid spreading germs to others.    Wash your hands and face frequently with soap and water    Fever Medicines:    For fever relief, take acetaminophen or ibuprofen.    Treat fevers above 101  F (38.3  C) to lower fevers and make you more comfortable.     Acetaminophen (e.g., Tylenol): Take 650 mg (two 325 mg pills) by mouth every 4-6 hours as needed of regular strength Tylenol or 1,000 mg (two 500 mg pills) every 8 hours as needed of Extra Strength Tylenol.     Ibuprofen (e.g., Motrin, Advil): Take 400 mg (two 200 mg pills) by mouth every 6 hours as needed.     Acetaminophen is thought to be safer than ibuprofen or naproxen for people over 65 years old. Acetaminophen is in  many OTC and prescription medicines. It might be in more than one medicine that you are taking. You need to be careful and not take an overdose. Before taking any medicine, read all the instructions on the package.    Caution -NSAIDs (e.g., ibuprofen, naproxen): Do not take nonsteroidal anti-inflammatory drugs (NSAIDs) if you have stomach problems, kidney disease, heart failure, or other contraindications to using this type of medicine. Do not take NSAID medicines for over 7 days without consulting your PCP. Do not take NSAID medicines if you are pregnant. Do not take NSAID medicines if you are also taking blood thinners.     Thank you for limiting contact with others, wearing a simple mask to cover your cough, practice good hand hygiene habits and accessing our virtual services where possible to limit the spread of this virus.    For more information about COVID19 and options for caring for yourself at home, please visit the CDC website at https://www.cdc.gov/coronavirus/2019-ncov/about/steps-when-sick.html  For more options for care at [facility], please visit our website at [email or web address]        Goldie Tolbert CNP 03/23/2012:19 PM

## 2020-03-23 NOTE — PATIENT INSTRUCTIONS
Please use the information at the end of this document to sign up for [facility] [employer] Innovationshart where you can get your results and a message about those results sent to you through the [employer] application. If you do not have mychart we will call you with your results but it may take longer.    Regardless of if you have been tested or not:  Patient who have symptoms (cough, fever, or shortness of breath), need to isolate for 7 days from when symptoms started OR 72 hours after fever resolves (without fever reducing medications) AND improvement of respiratory symptoms (whichever is longer).      Isolate yourself at home (in own room/own bathroom if possible)    Do Not allow any visitors    Do Not go to work or school    Do Not go to [employer],  centers, shopping, or other public places.    Do Not shake hands.    Avoid close and intimate contact with others (hugging, kissing).    Follow CDC recommendations for household cleaning of frequently touched services.     After the initial 7 days, continue to isolate yourself from household members as much as possible. To continue decrease the risk of community spread and exposure, you and any members of your household should limit activities in public for 14 days after starting home isolation.     You can reference the following CDC link for helpful home isolation/care tips:  https://www.cdc.gov/coronavirus/2019-ncov/downloads/10Things.pdf    Protect Others:    Cover Your Mouth and Nose with a mask, disposable tissue or wash cloth to avoid spreading germs to others.    Wash your hands and face frequently with soap and water    Call Back If: Breathing difficulty develops or you become worse.    For more information about COVID19 and options for caring for yourself at home, please visit the CDC website at https://www.cdc.gov/coronavirus/2019-ncov/about/steps-when-sick.html  For more options for care at [facility], please visit our website at  [email or web address]

## 2020-03-26 LAB
COVID-19 VIRUS PCR TO MAYO - RESULT: NORMAL
SPECIMEN SOURCE: NORMAL

## 2020-09-14 ENCOUNTER — COMMUNICATION - HEALTHEAST (OUTPATIENT)
Dept: FAMILY MEDICINE | Facility: CLINIC | Age: 35
End: 2020-09-14

## 2020-09-14 ENCOUNTER — OFFICE VISIT - HEALTHEAST (OUTPATIENT)
Dept: FAMILY MEDICINE | Facility: CLINIC | Age: 35
End: 2020-09-14

## 2020-09-14 DIAGNOSIS — M79.645 PAIN OF LEFT THUMB: ICD-10-CM

## 2020-09-14 DIAGNOSIS — M54.31 BILATERAL SCIATICA: ICD-10-CM

## 2020-09-14 DIAGNOSIS — M54.32 BILATERAL SCIATICA: ICD-10-CM

## 2020-09-14 DIAGNOSIS — Z00.00 HEALTH CARE MAINTENANCE: ICD-10-CM

## 2020-09-14 DIAGNOSIS — L40.9 PSORIASIS: ICD-10-CM

## 2020-09-23 ENCOUNTER — RECORDS - HEALTHEAST (OUTPATIENT)
Dept: ADMINISTRATIVE | Facility: OTHER | Age: 35
End: 2020-09-23

## 2020-09-24 ENCOUNTER — HOSPITAL ENCOUNTER (OUTPATIENT)
Dept: PHYSICAL MEDICINE AND REHAB | Facility: CLINIC | Age: 35
Discharge: HOME | End: 2020-09-24
Attending: NURSE PRACTITIONER

## 2020-09-24 DIAGNOSIS — M54.41 CHRONIC BILATERAL LOW BACK PAIN WITH BILATERAL SCIATICA: ICD-10-CM

## 2020-09-24 DIAGNOSIS — M54.42 CHRONIC BILATERAL LOW BACK PAIN WITH BILATERAL SCIATICA: ICD-10-CM

## 2020-09-24 DIAGNOSIS — G89.29 CHRONIC BILATERAL LOW BACK PAIN WITH BILATERAL SCIATICA: ICD-10-CM

## 2020-09-24 ASSESSMENT — MIFFLIN-ST. JEOR: SCORE: 2192.29

## 2020-09-30 ENCOUNTER — HOSPITAL ENCOUNTER (OUTPATIENT)
Dept: MRI IMAGING | Facility: HOSPITAL | Age: 35
Discharge: HOME | End: 2020-09-30
Attending: PHYSICIAN ASSISTANT

## 2020-09-30 ENCOUNTER — COMMUNICATION - HEALTHEAST (OUTPATIENT)
Dept: PHYSICAL MEDICINE AND REHAB | Facility: CLINIC | Age: 35
End: 2020-09-30

## 2020-09-30 DIAGNOSIS — G89.29 CHRONIC BILATERAL LOW BACK PAIN WITH BILATERAL SCIATICA: ICD-10-CM

## 2020-09-30 DIAGNOSIS — M54.41 CHRONIC BILATERAL LOW BACK PAIN WITH BILATERAL SCIATICA: ICD-10-CM

## 2020-09-30 DIAGNOSIS — M54.16 LUMBAR RADICULITIS: ICD-10-CM

## 2020-09-30 DIAGNOSIS — M54.42 CHRONIC BILATERAL LOW BACK PAIN WITH BILATERAL SCIATICA: ICD-10-CM

## 2020-10-02 ENCOUNTER — COMMUNICATION - HEALTHEAST (OUTPATIENT)
Dept: PHYSICAL MEDICINE AND REHAB | Facility: CLINIC | Age: 35
End: 2020-10-02

## 2020-10-02 DIAGNOSIS — G89.29 CHRONIC BILATERAL LOW BACK PAIN WITH BILATERAL SCIATICA: ICD-10-CM

## 2020-10-02 DIAGNOSIS — M54.41 CHRONIC BILATERAL LOW BACK PAIN WITH BILATERAL SCIATICA: ICD-10-CM

## 2020-10-02 DIAGNOSIS — M54.42 CHRONIC BILATERAL LOW BACK PAIN WITH BILATERAL SCIATICA: ICD-10-CM

## 2020-10-19 ENCOUNTER — OFFICE VISIT - HEALTHEAST (OUTPATIENT)
Dept: PHYSICAL THERAPY | Facility: CLINIC | Age: 35
End: 2020-10-19

## 2020-10-19 DIAGNOSIS — M54.41 CHRONIC MIDLINE LOW BACK PAIN WITH BILATERAL SCIATICA: ICD-10-CM

## 2020-10-19 DIAGNOSIS — G89.29 CHRONIC MIDLINE LOW BACK PAIN WITH BILATERAL SCIATICA: ICD-10-CM

## 2020-10-19 DIAGNOSIS — M54.42 CHRONIC MIDLINE LOW BACK PAIN WITH BILATERAL SCIATICA: ICD-10-CM

## 2020-10-20 ENCOUNTER — RECORDS - HEALTHEAST (OUTPATIENT)
Dept: ADMINISTRATIVE | Facility: OTHER | Age: 35
End: 2020-10-20

## 2020-10-22 ENCOUNTER — OFFICE VISIT - HEALTHEAST (OUTPATIENT)
Dept: PHYSICAL THERAPY | Facility: CLINIC | Age: 35
End: 2020-10-22

## 2020-10-22 DIAGNOSIS — M54.42 CHRONIC MIDLINE LOW BACK PAIN WITH BILATERAL SCIATICA: ICD-10-CM

## 2020-10-22 DIAGNOSIS — M54.41 CHRONIC MIDLINE LOW BACK PAIN WITH BILATERAL SCIATICA: ICD-10-CM

## 2020-10-22 DIAGNOSIS — G89.29 CHRONIC MIDLINE LOW BACK PAIN WITH BILATERAL SCIATICA: ICD-10-CM

## 2020-10-26 ENCOUNTER — OFFICE VISIT - HEALTHEAST (OUTPATIENT)
Dept: PHYSICAL THERAPY | Facility: CLINIC | Age: 35
End: 2020-10-26

## 2020-10-26 DIAGNOSIS — M54.42 CHRONIC MIDLINE LOW BACK PAIN WITH BILATERAL SCIATICA: ICD-10-CM

## 2020-10-26 DIAGNOSIS — M54.41 CHRONIC MIDLINE LOW BACK PAIN WITH BILATERAL SCIATICA: ICD-10-CM

## 2020-10-26 DIAGNOSIS — G89.29 CHRONIC MIDLINE LOW BACK PAIN WITH BILATERAL SCIATICA: ICD-10-CM

## 2020-10-29 ENCOUNTER — OFFICE VISIT - HEALTHEAST (OUTPATIENT)
Dept: PHYSICAL THERAPY | Facility: CLINIC | Age: 35
End: 2020-10-29

## 2020-10-29 DIAGNOSIS — G89.29 CHRONIC MIDLINE LOW BACK PAIN WITH BILATERAL SCIATICA: ICD-10-CM

## 2020-10-29 DIAGNOSIS — M54.42 CHRONIC MIDLINE LOW BACK PAIN WITH BILATERAL SCIATICA: ICD-10-CM

## 2020-10-29 DIAGNOSIS — M54.41 CHRONIC MIDLINE LOW BACK PAIN WITH BILATERAL SCIATICA: ICD-10-CM

## 2020-11-02 ENCOUNTER — OFFICE VISIT - HEALTHEAST (OUTPATIENT)
Dept: PHYSICAL THERAPY | Facility: CLINIC | Age: 35
End: 2020-11-02

## 2020-11-02 DIAGNOSIS — M54.42 CHRONIC MIDLINE LOW BACK PAIN WITH BILATERAL SCIATICA: ICD-10-CM

## 2020-11-02 DIAGNOSIS — G89.29 CHRONIC MIDLINE LOW BACK PAIN WITH BILATERAL SCIATICA: ICD-10-CM

## 2020-11-02 DIAGNOSIS — M54.41 CHRONIC MIDLINE LOW BACK PAIN WITH BILATERAL SCIATICA: ICD-10-CM

## 2020-11-05 ENCOUNTER — OFFICE VISIT - HEALTHEAST (OUTPATIENT)
Dept: PHYSICAL THERAPY | Facility: CLINIC | Age: 35
End: 2020-11-05

## 2020-11-05 DIAGNOSIS — M54.41 CHRONIC MIDLINE LOW BACK PAIN WITH BILATERAL SCIATICA: ICD-10-CM

## 2020-11-05 DIAGNOSIS — G89.29 CHRONIC MIDLINE LOW BACK PAIN WITH BILATERAL SCIATICA: ICD-10-CM

## 2020-11-05 DIAGNOSIS — M54.42 CHRONIC MIDLINE LOW BACK PAIN WITH BILATERAL SCIATICA: ICD-10-CM

## 2020-11-09 ENCOUNTER — OFFICE VISIT - HEALTHEAST (OUTPATIENT)
Dept: PHYSICAL THERAPY | Facility: CLINIC | Age: 35
End: 2020-11-09

## 2020-11-09 DIAGNOSIS — M54.41 CHRONIC MIDLINE LOW BACK PAIN WITH BILATERAL SCIATICA: ICD-10-CM

## 2020-11-09 DIAGNOSIS — G89.29 CHRONIC MIDLINE LOW BACK PAIN WITH BILATERAL SCIATICA: ICD-10-CM

## 2020-11-09 DIAGNOSIS — M54.42 CHRONIC MIDLINE LOW BACK PAIN WITH BILATERAL SCIATICA: ICD-10-CM

## 2020-11-12 ENCOUNTER — OFFICE VISIT - HEALTHEAST (OUTPATIENT)
Dept: PHYSICAL THERAPY | Facility: CLINIC | Age: 35
End: 2020-11-12

## 2020-11-12 DIAGNOSIS — M54.42 CHRONIC MIDLINE LOW BACK PAIN WITH BILATERAL SCIATICA: ICD-10-CM

## 2020-11-12 DIAGNOSIS — G89.29 CHRONIC MIDLINE LOW BACK PAIN WITH BILATERAL SCIATICA: ICD-10-CM

## 2020-11-12 DIAGNOSIS — M54.41 CHRONIC MIDLINE LOW BACK PAIN WITH BILATERAL SCIATICA: ICD-10-CM

## 2020-11-19 ENCOUNTER — OFFICE VISIT - HEALTHEAST (OUTPATIENT)
Dept: PHYSICAL THERAPY | Facility: CLINIC | Age: 35
End: 2020-11-19

## 2020-11-19 DIAGNOSIS — M54.41 CHRONIC MIDLINE LOW BACK PAIN WITH BILATERAL SCIATICA: ICD-10-CM

## 2020-11-19 DIAGNOSIS — M54.42 CHRONIC MIDLINE LOW BACK PAIN WITH BILATERAL SCIATICA: ICD-10-CM

## 2020-11-19 DIAGNOSIS — G89.29 CHRONIC MIDLINE LOW BACK PAIN WITH BILATERAL SCIATICA: ICD-10-CM

## 2020-11-23 ENCOUNTER — OFFICE VISIT - HEALTHEAST (OUTPATIENT)
Dept: PHYSICAL THERAPY | Facility: CLINIC | Age: 35
End: 2020-11-23

## 2020-11-23 DIAGNOSIS — M54.42 CHRONIC MIDLINE LOW BACK PAIN WITH BILATERAL SCIATICA: ICD-10-CM

## 2020-11-23 DIAGNOSIS — G89.29 CHRONIC MIDLINE LOW BACK PAIN WITH BILATERAL SCIATICA: ICD-10-CM

## 2020-11-23 DIAGNOSIS — M54.41 CHRONIC MIDLINE LOW BACK PAIN WITH BILATERAL SCIATICA: ICD-10-CM

## 2020-11-30 ENCOUNTER — COMMUNICATION - HEALTHEAST (OUTPATIENT)
Dept: FAMILY MEDICINE | Facility: CLINIC | Age: 35
End: 2020-11-30

## 2020-11-30 ENCOUNTER — OFFICE VISIT - HEALTHEAST (OUTPATIENT)
Dept: PHYSICAL THERAPY | Facility: CLINIC | Age: 35
End: 2020-11-30

## 2020-11-30 ENCOUNTER — OFFICE VISIT - HEALTHEAST (OUTPATIENT)
Dept: FAMILY MEDICINE | Facility: CLINIC | Age: 35
End: 2020-11-30

## 2020-11-30 DIAGNOSIS — G89.29 CHRONIC MIDLINE LOW BACK PAIN WITH BILATERAL SCIATICA: ICD-10-CM

## 2020-11-30 DIAGNOSIS — M54.42 CHRONIC MIDLINE LOW BACK PAIN WITH BILATERAL SCIATICA: ICD-10-CM

## 2020-11-30 DIAGNOSIS — M54.41 CHRONIC MIDLINE LOW BACK PAIN WITH BILATERAL SCIATICA: ICD-10-CM

## 2020-11-30 DIAGNOSIS — R68.84 JAW PAIN: ICD-10-CM

## 2020-11-30 ASSESSMENT — MIFFLIN-ST. JEOR: SCORE: 2153.73

## 2020-12-07 ENCOUNTER — OFFICE VISIT - HEALTHEAST (OUTPATIENT)
Dept: PHYSICAL THERAPY | Facility: CLINIC | Age: 35
End: 2020-12-07

## 2020-12-07 DIAGNOSIS — M54.41 CHRONIC MIDLINE LOW BACK PAIN WITH BILATERAL SCIATICA: ICD-10-CM

## 2020-12-07 DIAGNOSIS — G89.29 CHRONIC MIDLINE LOW BACK PAIN WITH BILATERAL SCIATICA: ICD-10-CM

## 2020-12-07 DIAGNOSIS — M54.42 CHRONIC MIDLINE LOW BACK PAIN WITH BILATERAL SCIATICA: ICD-10-CM

## 2020-12-14 ENCOUNTER — OFFICE VISIT - HEALTHEAST (OUTPATIENT)
Dept: PHYSICAL THERAPY | Facility: CLINIC | Age: 35
End: 2020-12-14

## 2020-12-14 ENCOUNTER — HEALTH MAINTENANCE LETTER (OUTPATIENT)
Age: 35
End: 2020-12-14

## 2020-12-14 DIAGNOSIS — G89.29 CHRONIC MIDLINE LOW BACK PAIN WITH BILATERAL SCIATICA: ICD-10-CM

## 2020-12-14 DIAGNOSIS — M54.41 CHRONIC MIDLINE LOW BACK PAIN WITH BILATERAL SCIATICA: ICD-10-CM

## 2020-12-14 DIAGNOSIS — M54.42 CHRONIC MIDLINE LOW BACK PAIN WITH BILATERAL SCIATICA: ICD-10-CM

## 2020-12-21 ENCOUNTER — OFFICE VISIT - HEALTHEAST (OUTPATIENT)
Dept: PHYSICAL THERAPY | Facility: CLINIC | Age: 35
End: 2020-12-21

## 2020-12-21 DIAGNOSIS — G89.29 CHRONIC MIDLINE LOW BACK PAIN WITH BILATERAL SCIATICA: ICD-10-CM

## 2020-12-21 DIAGNOSIS — M54.41 CHRONIC MIDLINE LOW BACK PAIN WITH BILATERAL SCIATICA: ICD-10-CM

## 2020-12-21 DIAGNOSIS — M54.42 CHRONIC MIDLINE LOW BACK PAIN WITH BILATERAL SCIATICA: ICD-10-CM

## 2021-01-07 ENCOUNTER — OFFICE VISIT - HEALTHEAST (OUTPATIENT)
Dept: PHYSICAL THERAPY | Facility: CLINIC | Age: 36
End: 2021-01-07

## 2021-01-07 DIAGNOSIS — G89.29 CHRONIC MIDLINE LOW BACK PAIN WITH BILATERAL SCIATICA: ICD-10-CM

## 2021-01-07 DIAGNOSIS — M54.42 CHRONIC MIDLINE LOW BACK PAIN WITH BILATERAL SCIATICA: ICD-10-CM

## 2021-01-07 DIAGNOSIS — M54.41 CHRONIC MIDLINE LOW BACK PAIN WITH BILATERAL SCIATICA: ICD-10-CM

## 2021-01-11 ENCOUNTER — HOSPITAL ENCOUNTER (OUTPATIENT)
Dept: PHYSICAL MEDICINE AND REHAB | Facility: CLINIC | Age: 36
Discharge: HOME | End: 2021-01-11
Attending: PHYSICIAN ASSISTANT

## 2021-01-11 DIAGNOSIS — M54.16 LUMBAR RADICULITIS: ICD-10-CM

## 2021-01-11 DIAGNOSIS — M25.552 BILATERAL HIP PAIN: ICD-10-CM

## 2021-01-11 DIAGNOSIS — M25.551 BILATERAL HIP PAIN: ICD-10-CM

## 2021-01-11 DIAGNOSIS — M53.3 SACROILIAC JOINT PAIN: ICD-10-CM

## 2021-01-11 ASSESSMENT — MIFFLIN-ST. JEOR: SCORE: 2151.46

## 2021-01-17 ENCOUNTER — COMMUNICATION - HEALTHEAST (OUTPATIENT)
Dept: PHYSICAL MEDICINE AND REHAB | Facility: CLINIC | Age: 36
End: 2021-01-17

## 2021-03-10 ENCOUNTER — HOSPITAL ENCOUNTER (OUTPATIENT)
Dept: PHYSICAL MEDICINE AND REHAB | Facility: CLINIC | Age: 36
Discharge: HOME | End: 2021-03-10
Attending: PHYSICIAN ASSISTANT

## 2021-03-10 DIAGNOSIS — M25.552 BILATERAL HIP PAIN: ICD-10-CM

## 2021-03-10 DIAGNOSIS — M25.551 BILATERAL HIP PAIN: ICD-10-CM

## 2021-04-21 ENCOUNTER — OFFICE VISIT - HEALTHEAST (OUTPATIENT)
Dept: FAMILY MEDICINE | Facility: CLINIC | Age: 36
End: 2021-04-21

## 2021-04-21 DIAGNOSIS — R53.83 FATIGUE, UNSPECIFIED TYPE: ICD-10-CM

## 2021-04-21 DIAGNOSIS — L40.9 PSORIASIS: ICD-10-CM

## 2021-04-21 DIAGNOSIS — E66.01 MORBID OBESITY (H): ICD-10-CM

## 2021-04-21 DIAGNOSIS — M25.50 POLYARTHRALGIA: ICD-10-CM

## 2021-04-21 DIAGNOSIS — B37.2 CANDIDIASIS OF SKIN: ICD-10-CM

## 2021-04-21 LAB
ANION GAP SERPL CALCULATED.3IONS-SCNC: 12 MMOL/L (ref 5–18)
BUN SERPL-MCNC: 15 MG/DL (ref 8–22)
CALCIUM SERPL-MCNC: 9.1 MG/DL (ref 8.5–10.5)
CHLORIDE BLD-SCNC: 105 MMOL/L (ref 98–107)
CO2 SERPL-SCNC: 22 MMOL/L (ref 22–31)
CREAT SERPL-MCNC: 0.68 MG/DL (ref 0.6–1.1)
ERYTHROCYTE [DISTWIDTH] IN BLOOD BY AUTOMATED COUNT: 13.2 % (ref 11–14.5)
ERYTHROCYTE [SEDIMENTATION RATE] IN BLOOD BY WESTERGREN METHOD: 47 MM/HR (ref 0–20)
GFR SERPL CREATININE-BSD FRML MDRD: >60 ML/MIN/1.73M2
GLUCOSE BLD-MCNC: 73 MG/DL (ref 70–125)
HCT VFR BLD AUTO: 40.3 % (ref 35–47)
HGB BLD-MCNC: 13.6 G/DL (ref 12–16)
MCH RBC QN AUTO: 30.5 PG (ref 27–34)
MCHC RBC AUTO-ENTMCNC: 33.7 G/DL (ref 32–36)
MCV RBC AUTO: 90 FL (ref 80–100)
PLATELET # BLD AUTO: 418 THOU/UL (ref 140–440)
PMV BLD AUTO: 8.7 FL (ref 7–10)
POTASSIUM BLD-SCNC: 4.6 MMOL/L (ref 3.5–5)
RBC # BLD AUTO: 4.46 MILL/UL (ref 3.8–5.4)
RHEUMATOID FACT SERPL-ACNC: <15 IU/ML (ref 0–30)
SODIUM SERPL-SCNC: 139 MMOL/L (ref 136–145)
TSH SERPL DL<=0.005 MIU/L-ACNC: 3.19 UIU/ML (ref 0.3–5)
WBC: 13.4 THOU/UL (ref 4–11)

## 2021-04-22 LAB
25(OH)D3 SERPL-MCNC: 23.3 NG/ML (ref 30–80)
25(OH)D3 SERPL-MCNC: 23.3 NG/ML (ref 30–80)
B BURGDOR IGG+IGM SER QL: 0.06 INDEX VALUE

## 2021-04-23 LAB
GLIADIN IGA SER-ACNC: 2.1 U/ML
GLIADIN IGG SER-ACNC: <0.4 U/ML
IGA SERPL-MCNC: 336 MG/DL (ref 65–400)
TTG IGA SER-ACNC: 0.6 U/ML
TTG IGG SER-ACNC: <0.6 U/ML

## 2021-04-26 ENCOUNTER — AMBULATORY - HEALTHEAST (OUTPATIENT)
Dept: FAMILY MEDICINE | Facility: CLINIC | Age: 36
End: 2021-04-26

## 2021-04-26 DIAGNOSIS — D72.829 LEUKOCYTOSIS, UNSPECIFIED TYPE: ICD-10-CM

## 2021-04-26 DIAGNOSIS — E55.9 VITAMIN D DEFICIENCY: ICD-10-CM

## 2021-04-26 LAB — ANA SER QL: 0.4 U

## 2021-04-29 ENCOUNTER — AMBULATORY - HEALTHEAST (OUTPATIENT)
Dept: LAB | Facility: CLINIC | Age: 36
End: 2021-04-29

## 2021-04-29 DIAGNOSIS — D72.829 LEUKOCYTOSIS, UNSPECIFIED TYPE: ICD-10-CM

## 2021-04-29 DIAGNOSIS — Z00.00 ROUTINE GENERAL MEDICAL EXAMINATION AT A HEALTH CARE FACILITY: ICD-10-CM

## 2021-04-29 LAB
BASOPHILS # BLD AUTO: 0.1 THOU/UL (ref 0–0.2)
BASOPHILS NFR BLD AUTO: 0 % (ref 0–2)
EOSINOPHIL # BLD AUTO: 0.2 THOU/UL (ref 0–0.4)
EOSINOPHIL NFR BLD AUTO: 2 % (ref 0–6)
ERYTHROCYTE [DISTWIDTH] IN BLOOD BY AUTOMATED COUNT: 13.7 % (ref 11–14.5)
HCT VFR BLD AUTO: 39.8 % (ref 35–47)
HGB BLD-MCNC: 12.8 G/DL (ref 12–16)
IMM GRANULOCYTES # BLD: 0 THOU/UL
IMM GRANULOCYTES NFR BLD: 0 %
LYMPHOCYTES # BLD AUTO: 2.4 THOU/UL (ref 0.8–4.4)
LYMPHOCYTES NFR BLD AUTO: 18 % (ref 20–40)
MCH RBC QN AUTO: 29.9 PG (ref 27–34)
MCHC RBC AUTO-ENTMCNC: 32.2 G/DL (ref 32–36)
MCV RBC AUTO: 93 FL (ref 80–100)
MONOCYTES # BLD AUTO: 0.6 THOU/UL (ref 0–0.9)
MONOCYTES NFR BLD AUTO: 5 % (ref 2–10)
NEUTROPHILS # BLD AUTO: 10 THOU/UL (ref 2–7.7)
NEUTROPHILS NFR BLD AUTO: 75 % (ref 50–70)
PLATELET # BLD AUTO: 411 THOU/UL (ref 140–440)
PMV BLD AUTO: 9.9 FL (ref 8.5–12.5)
RBC # BLD AUTO: 4.28 MILL/UL (ref 3.8–5.4)
WBC: 13.3 THOU/UL (ref 4–11)

## 2021-04-30 LAB
BASOPHILS # BLD AUTO: 0.1 THOU/UL (ref 0–0.2)
BASOPHILS NFR BLD AUTO: 0 % (ref 0–2)
EOSINOPHIL # BLD AUTO: 0.2 THOU/UL (ref 0–0.4)
EOSINOPHIL NFR BLD AUTO: 2 % (ref 0–6)
ERYTHROCYTE [DISTWIDTH] IN BLOOD BY AUTOMATED COUNT: 13.8 % (ref 11–14.5)
HCT VFR BLD AUTO: 39.1 % (ref 35–47)
HGB BLD-MCNC: 12.6 G/DL (ref 12–16)
IMM GRANULOCYTES # BLD: 0.1 THOU/UL
IMM GRANULOCYTES NFR BLD: 0 %
LAB AP CHARGES (HE HISTORICAL CONVERSION): NORMAL
LYMPHOCYTES # BLD AUTO: 2.2 THOU/UL (ref 0.8–4.4)
LYMPHOCYTES NFR BLD AUTO: 17 % (ref 20–40)
MCH RBC QN AUTO: 29.9 PG (ref 27–34)
MCHC RBC AUTO-ENTMCNC: 32.2 G/DL (ref 32–36)
MCV RBC AUTO: 93 FL (ref 80–100)
MONOCYTES # BLD AUTO: 0.6 THOU/UL (ref 0–0.9)
MONOCYTES NFR BLD AUTO: 4 % (ref 2–10)
NEUTROPHILS # BLD AUTO: 10 THOU/UL (ref 2–7.7)
NEUTROPHILS NFR BLD AUTO: 77 % (ref 50–70)
PATH REPORT.COMMENTS IMP SPEC: NORMAL
PATH REPORT.COMMENTS IMP SPEC: NORMAL
PATH REPORT.FINAL DX SPEC: NORMAL
PATH REPORT.MICROSCOPIC SPEC OTHER STN: ABNORMAL
PATH REPORT.MICROSCOPIC SPEC OTHER STN: NORMAL
PATH REPORT.RELEVANT HX SPEC: NORMAL
PLATELET # BLD AUTO: 425 THOU/UL (ref 140–440)
PMV BLD AUTO: 9.8 FL (ref 8.5–12.5)
RBC # BLD AUTO: 4.21 MILL/UL (ref 3.8–5.4)
WBC: 13.1 THOU/UL (ref 4–11)

## 2021-05-02 ENCOUNTER — AMBULATORY - HEALTHEAST (OUTPATIENT)
Dept: FAMILY MEDICINE | Facility: CLINIC | Age: 36
End: 2021-05-02

## 2021-05-02 DIAGNOSIS — D72.829 LEUKOCYTOSIS, UNSPECIFIED TYPE: ICD-10-CM

## 2021-05-10 ENCOUNTER — COMMUNICATION - HEALTHEAST (OUTPATIENT)
Dept: ADMINISTRATIVE | Facility: HOSPITAL | Age: 36
End: 2021-05-10

## 2021-05-11 ENCOUNTER — COMMUNICATION - HEALTHEAST (OUTPATIENT)
Dept: ADMINISTRATIVE | Facility: HOSPITAL | Age: 36
End: 2021-05-11

## 2021-05-25 ENCOUNTER — OFFICE VISIT - HEALTHEAST (OUTPATIENT)
Dept: ONCOLOGY | Facility: CLINIC | Age: 36
End: 2021-05-25

## 2021-05-25 DIAGNOSIS — D72.828 NEUTROPHILIA: ICD-10-CM

## 2021-05-25 DIAGNOSIS — D75.839 THROMBOCYTOSIS: ICD-10-CM

## 2021-05-25 LAB
FERRITIN SERPL-MCNC: 133 NG/ML (ref 10–130)
IRON SATN MFR SERPL: 9 % (ref 20–50)
IRON SERPL-MCNC: 23 UG/DL (ref 42–175)
TIBC SERPL-MCNC: 252 UG/DL (ref 313–563)
TRANSFERRIN SERPL-MCNC: 201 MG/DL (ref 212–360)

## 2021-05-25 ASSESSMENT — MIFFLIN-ST. JEOR: SCORE: 2201.36

## 2021-05-30 VITALS — WEIGHT: 293 LBS | BODY MASS INDEX: 52.92 KG/M2

## 2021-05-31 VITALS — WEIGHT: 293 LBS | HEIGHT: 65 IN | BODY MASS INDEX: 48.82 KG/M2

## 2021-05-31 VITALS — WEIGHT: 293 LBS | BODY MASS INDEX: 48.82 KG/M2 | HEIGHT: 65 IN

## 2021-06-01 VITALS — HEIGHT: 65 IN | WEIGHT: 293 LBS | BODY MASS INDEX: 48.82 KG/M2

## 2021-06-01 VITALS — WEIGHT: 293 LBS | HEIGHT: 65 IN | BODY MASS INDEX: 48.82 KG/M2

## 2021-06-02 VITALS — HEIGHT: 65 IN | WEIGHT: 293 LBS | BODY MASS INDEX: 48.82 KG/M2

## 2021-06-03 LAB
MISCELLANEOUS TEST DEPT. - HE HISTORICAL: NORMAL
MISCELLANEOUS TEST DEPT. - HE HISTORICAL: NORMAL
PERFORMING LAB: NORMAL
PERFORMING LAB: NORMAL
SPECIMEN STATUS: NORMAL
SPECIMEN STATUS: NORMAL
TEST NAME: NORMAL
TEST NAME: NORMAL

## 2021-06-04 VITALS
DIASTOLIC BLOOD PRESSURE: 86 MMHG | WEIGHT: 293 LBS | HEART RATE: 82 BPM | SYSTOLIC BLOOD PRESSURE: 126 MMHG | OXYGEN SATURATION: 99 % | BODY MASS INDEX: 55.08 KG/M2

## 2021-06-04 VITALS — HEIGHT: 65 IN | BODY MASS INDEX: 48.82 KG/M2 | WEIGHT: 293 LBS

## 2021-06-04 VITALS
BODY MASS INDEX: 48.82 KG/M2 | HEIGHT: 65 IN | WEIGHT: 293 LBS | HEART RATE: 100 BPM | DIASTOLIC BLOOD PRESSURE: 84 MMHG | OXYGEN SATURATION: 98 % | SYSTOLIC BLOOD PRESSURE: 128 MMHG

## 2021-06-05 VITALS
HEART RATE: 99 BPM | DIASTOLIC BLOOD PRESSURE: 88 MMHG | SYSTOLIC BLOOD PRESSURE: 130 MMHG | WEIGHT: 293 LBS | OXYGEN SATURATION: 99 % | BODY MASS INDEX: 54.12 KG/M2

## 2021-06-05 VITALS
WEIGHT: 293 LBS | HEART RATE: 78 BPM | DIASTOLIC BLOOD PRESSURE: 78 MMHG | HEIGHT: 65 IN | TEMPERATURE: 98.3 F | BODY MASS INDEX: 48.82 KG/M2 | SYSTOLIC BLOOD PRESSURE: 118 MMHG

## 2021-06-05 VITALS — HEIGHT: 65 IN | WEIGHT: 293 LBS | BODY MASS INDEX: 48.82 KG/M2

## 2021-06-05 NOTE — PROGRESS NOTES
Assessment and Plan:     1. Atypical chest pain  Sedimentation Rate    HM2(CBC w/o Differential)   2. Elevated C-reactive protein (CRP)  C-Reactive Protein   3. Morbid obesity (H)       Patient has had an extensive work-up for chest pain including negative troponins, d-dimer, unremarkable EKG, CT of the chest, Doppler ultrasound of her lower extremity.  Patient states she has tried omeprazole for acid reflux with no relief.  Discussed that her symptoms present similar to costochondritis or myofascial pain.  Lifting her suitcase may have caused this.  Discussed symptomatic treatment continue rest, ice, NSAIDs with food.  Offered muscle relaxant, but she declines.  Provided handouts on costochondritis.  We will recheck hemogram and C-reactive protein today.  She is to follow-up if symptoms persist or worsen.  She is content with the plan. I spent 25 minutes with the patient with greater than 50% spent discussing symptoms, treatment options, and coordination of care.     Subjective:     Alicia is a 34 y.o. female presenting to the clinic for follow-up on emergency room evaluation.  Patient traveled to [place] December 14.  Shortly after arriving, she developed chest pain, lightheadedness, dizziness.  She presented to an emergency room in [place] where she had troponins, d-dimer, EKG, and chest x-ray which were unremarkable.  Patient returned home and her symptoms gradually improved.  Patient did wake up with worsening pain on 1/8/2019 where she presented to the emergency room again.  Troponin, BMP were normal.  Hemogram showed a white blood count of 11.6 and C-reactive protein of 3.2.  CTA of the chest was negative.  Doppler ultrasound of the her left lower extremity was negative for DVT.  Patient presents today with ongoing chest pain.  She describes the pain as a constant ache which is primarily within her mid sternal region.  This will occasionally radiate to her left upper breast, left axilla, and left thoracic  region.  Moving her arms exacerbates the pain.  Patient did have some sinus symptoms last week including cough and postnasal drainage, but that has subsided.  She denies nausea, vomiting, heartburn.  She has not been taking any pain medication.  She denies any recent injury or trauma to the chest.    Review of Systems: A complete 14 point review of systems was obtained and is negative or as stated in the history of present illness.    Social History     Socioeconomic History     Marital status: Single     Spouse name: Not on file     Number of children: Not on file     Years of education: Not on file     Highest education level: Not on file   Occupational History     Not on file   Social Needs     Financial resource strain: Not on file     Food insecurity:     Worry: Not on file     Inability: Not on file     Transportation needs:     Medical: Not on file     Non-medical: Not on file   Tobacco Use     Smoking status: Never Smoker     Smokeless tobacco: Never Used   Substance and Sexual Activity     Alcohol use: No     Drug use: No     Sexual activity: Not on file   Lifestyle     Physical activity:     Days per week: Not on file     Minutes per session: Not on file     Stress: Not on file   Relationships     Social connections:     Talks on phone: Not on file     Gets together: Not on file     Attends [employer] service: Not on file     Active member of club or organization: Not on file     Attends meetings of clubs or organizations: Not on file     Relationship status: Not on file     Intimate partner violence:     Fear of current or ex partner: Not on file     Emotionally abused: Not on file     Physically abused: Not on file     Forced sexual activity: Not on file   Other Topics Concern     Not on file   Social History Narrative     Not on file       Active Ambulatory Problems     Diagnosis Date Noted     Abdominal Pain In The Central Upper Belly (Epigastric)      Serum Enzyme Levels - AST (SGOT) Elevated       "Abdominal Pain      Vitamin D deficiency 04/23/2018     Resolved Ambulatory Problems     Diagnosis Date Noted     No Resolved Ambulatory Problems     Past Medical History:   Diagnosis Date     Ulcer        Family History   Problem Relation Age of Onset     Hypertension Unknown        Objective:     /84   Pulse 100   Ht 5' 5\" (1.651 m)   Wt (!) 333 lb 9.6 oz (151.3 kg)   LMP 12/26/2019 (Exact Date)   SpO2 98%   BMI 55.51 kg/m      Patient is alert, in no obvious distress.   Skin: Warm, dry.  No lesions or rashes.  Skin turgor rapid return.   HEENT:  Head normocephalic, atraumatic.  Eyes normal.  Ears normal.  Nose patent, mucosa pink.  Oropharynx mucosa pink.  No lesions or tonsillar enlargement.   Neck: Supple, no lymphadenopathy.   Lungs:  Clear to auscultation. Respirations even and unlabored.  No wheezing or rales noted.   Heart:  Regular rate and rhythm.  No murmurs.   Musculoskeletal:  Full ROM of extremities.  She is tender to palpation of her mid sternum.              "

## 2021-06-11 NOTE — PROGRESS NOTES
Assessment and Plan:     1. Bilateral sciatica  methylPREDNISolone (MEDROL DOSEPACK) 4 mg tablet    cyclobenzaprine (FLEXERIL) 5 MG tablet    Ambulatory referral to Spine Care   2. Pain of left thumb  Ambulatory referral to Orthopedics   3. Psoriasis  Ambulatory referral to Dermatology    clobetasoL (OLUX) 0.05 % topical foam   4. Health care maintenance  Tdap vaccine greater than or equal to 8yo IM     Discussed symptomatic treatment of sciatic pain.  Provided prescription for Medrol Dosepak and cyclobenzaprine.  Educated on indications and side effects.  She is to avoid taking other sedatives with the cyclobenzaprine.  Discussed symptomatic treatment including rest, ice, stretching activities, foam rolling.  Will refer to the spine clinic due to chronic pain.  Patient is completing physical therapy exercises.  As for the pain of the thumb, suspect de Quervain's tenosynovitis.  Discussed symptomatic treatment including rest, ice, splinting.  Will refer to orthopedics for splint placement and possible cortisone injection.  Lastly, will refer to dermatology for further evaluation and treatment of psoriasis.  She continues to use clobetasol foam.  She is content with the plan.  Provided influenza and Tdap vaccines.    Subjective:     Alicia is a 34 y.o. female presenting to the clinic for multiple concerns today.  Patient developed bilateral low back and buttock pain in May.  Pain has been worsening and is now radiating to her mid hamstrings.  She has been seeing a chiropractor.  She denies any known injury.  She denies numbness and tingling of her extremities.  She has not had any urinary or fecal incontinence or retention.  She describes the pain as a constant ache which is exacerbated with sitting and ambulation.  She has been taking Tylenol, ibuprofen, and Aleve with minimal relief.  She has been performing physical therapy exercises.  She has a history of chronic back pain.  MRI in 2016 of the lumbar spine showed  a small right lateral disc herniation at L4-5 with mass-effect upon the L for nerve on the right.  Secondly, patient has been experiencing left thumb pain since May.  She denies any known injury.  She has not been performing activities with repetitive motion.  She did have some swelling at the onset of symptoms.  The pain has been worsening.  She feels as though nothing improves the pain.  Lastly, patient has been diagnosed with psoriasis on her scalp.  She has tried clobetasol foam with no relief.    Review of Systems: A complete 14 point review of systems was obtained and is negative or as stated in the history of present illness.    Social History     Socioeconomic History     Marital status: Single     Spouse name: Not on file     Number of children: Not on file     Years of education: Not on file     Highest education level: Not on file   Occupational History     Not on file   Social Needs     Financial resource strain: Not on file     Food insecurity     Worry: Not on file     Inability: Not on file     Transportation needs     Medical: Not on file     Non-medical: Not on file   Tobacco Use     Smoking status: Never Smoker     Smokeless tobacco: Never Used   Substance and Sexual Activity     Alcohol use: No     Drug use: No     Sexual activity: Not on file   Lifestyle     Physical activity     Days per week: Not on file     Minutes per session: Not on file     Stress: Not on file   Relationships     Social connections     Talks on phone: Not on file     Gets together: Not on file     Attends [employer] service: Not on file     Active member of club or organization: Not on file     Attends meetings of clubs or organizations: Not on file     Relationship status: Not on file     Intimate partner violence     Fear of current or ex partner: Not on file     Emotionally abused: Not on file     Physically abused: Not on file     Forced sexual activity: Not on file   Other Topics Concern     Not on file   Social History  Narrative     Not on file       Active Ambulatory Problems     Diagnosis Date Noted     Abdominal Pain In The Central Upper Belly (Epigastric)      Serum Enzyme Levels - AST (SGOT) Elevated      Vitamin D deficiency 04/23/2018     Morbid obesity (H) 01/10/2020     Resolved Ambulatory Problems     Diagnosis Date Noted     Abdominal pain      Past Medical History:   Diagnosis Date     Ulcer        Family History   Problem Relation Age of Onset     Hypertension Unknown        Objective:     /86 (Patient Site: Right Arm, Cuff Size: Adult Large)   Pulse 82   Wt (!) 331 lb (150.1 kg)   LMP 08/20/2020   SpO2 99%   BMI 55.08 kg/m      Patient is alert, in no obvious distress.   Skin: Warm, dry.  Dry scaly skin noted throughout her scalp.   Lungs:  Clear to auscultation. Respirations even and unlabored.  No wheezing or rales noted.   Heart:  Regular rate and rhythm.  No murmurs, S3, S4, gallops, or rubs.    Musculoskeletal:  Full ROM of extremities.  She ambulates without difficulty.  Patellar and Achilles DTRs symmetrical, sensations intact.  Straight leg raise is negative bilaterally.  She is able to heel and toe walk without difficulty.  She has full range of motion of her left thumb.  She is tender to palpation of her left thumb MCP.

## 2021-06-11 NOTE — PROGRESS NOTES
ASSESSMENT: Alicia Smith is a 34 y.o. female with past medical history significant for morbid obesity, vitamin D deficiency, psoriasis who presents today for new patient evaluation of a 4-month history of low back pain with radiation into the right greater than left lower extremity.  MRI lumbar spine from 2016 showed a small far lateral disc herniation at L4-5 with mass-effect upon the right L4 nerve.  Patient's current pain is different than the pain she has been.  I am concerned she may have a new disc protrusion at L5-S1.  Within the differential is also a sacroiliac joint dysfunction.  Patient had reproduction of her pain with SI joint provocative maneuvers x4 on the right and x3 on the left.  She was neurologically intact.    DELPHINE: 26  Who 5:18    PLAN:  A shared decision making model was used.  The patient's values and choices were respected.  The following represents what was discussed and decided upon by the physician assistant and the patient.      1.  DIAGNOSTIC TESTS: I reviewed the MRI lumbar spine from 2016.  I ordered an updated MRI lumbar spine for further evaluation.    2.  PHYSICAL THERAPY: Patient will likely benefit from physical therapy.  I held off on ordering this until he is seen the results of her MRI lumbar spine.    3.  MEDICATIONS:    - Nabumetone 750 mg twice daily as needed was prescribed.  She will use this instead of naproxen.  -Patient completed a Medrol Dosepak on Sunday which was very helpful but pain has returned since completing the Dosepak.  -Tylenol is not been helpful.  -Patient can take Flexeril as needed.    4.  INTERVENTIONS: No interventions were ordered.  Patient may benefit from interventional pain management if she fails to improve with conservative treatment, depending on the results of an updated MRI lumbar spine.    5.  PATIENT EDUCATION: Patient is in agreement the above plan.  All questions were answered.    6.  FOLLOW-UP:   A nurse will call the patient with the  "results of her MRI lumbar spine.  At that time I will likely recommend physical therapy and follow-up with me in 3 to 4 weeks.  If she has questions or concerns in the meantime, she should not hesitate to call.      SUBJECTIVE:  Alicia Smith  Is a 34 y.o. female who presents today in consultation at the request of Rosa Bentley CNP for evaluation of low back pain with radiation into the bilateral lower extremities, right greater than left.  I have seen this patient previously.  I last saw her February 22, 2016.  At that time she reported 100% resolution of right low back and right lower extremity radicular pain with physical therapy.  Patient reports that she did well up until May 2020.  In May she began to experience low back and bilateral leg pain.  She denies any specific injury or event to cause the pain.  Patient reports that when the pain first began it was off and on.  Her pain is now more consistent.  Patient notes that her current pain feels different than the pain she had in 2016.  In 2016 the pain involved to the lateral aspect of her right leg and she described the pain as a more \"nerve\" pain.  Current pain is in the posterior thigh and feels more muscular.  Back pain is also lower than prior pain.    Patient complains of bilateral low back pain.  Pain is located the lumbosacral junction.  Pain radiates into bilateral buttocks and down the bilateral posterior thighs, about residential or two thirds of the way to the knee.  Right leg is more painful than the left.  Pain is aggravated transitioning from seated to standing and vice versa.  Prolonged walking also aggravates the pain.  At times her walking is limited because the pain.  Patient notes that her gait has been different due to the pain.  She states that she sometimes limps or sways to mitigate the pain.  She has had some left lateral foot pain which she attributes to the change in her walking.  She denies any alleviating factors to the pain.  She " denies any numbness or tingling down the legs.  She does have a sensation of weakness in both legs when pain is severe.  Denies loss of bowel or bladder control.  Denies any recent fevers, chills, or sweats.    Patient participated in physical therapy in 2016.  She has been doing her home exercises regularly for the past 1 month and they are not helping.  She goes to the chiropractor about once per week which provides some relief of her pain.  She completed a Medrol Dosepak on Sunday.  Patient reports that she had significant improvement in her pain while she was taking the Medrol Dosepak, but since she has completed the Medrol Dosepak her pain has returned.  She has been taken naproxen as Tylenol but does not feel like these are helping.  She uses Flexeril occasionally.    Current Outpatient Medications on File Prior to Visit   Medication Sig Dispense Refill     acetaminophen (TYLENOL) 500 MG tablet        B complex-vitamin C-folic acid 0.8 mg Tab        calcium, as carbonate, (TUMS) 200 mg calcium (500 mg) chewable tablet        cholecalciferol, vitamin D3, 1,000 unit (25 mcg) tablet        clobetasoL (OLUX) 0.05 % topical foam Apply foam to the scalp twice daily for up to 2 weeks. 50 g 2     cyclobenzaprine (FLEXERIL) 5 MG tablet Take 1-2 tablets (5-10 mg total) by mouth 3 (three) times a day as needed. 30 tablet 0     ibuprofen (ADVIL,MOTRIN) 200 MG tablet        L.acid/L.casei/B.bif/B.floyd/FOS (PROBIOTIC BLEND ORAL)        loratadine (CLARITIN) 10 mg tablet        multivitamin with minerals (THERA-M) 9 mg iron-400 mcg Tab tablet        naproxen sodium (ALEVE) 220 MG tablet        vitamin C-biotin (HAIR-SKIN-NAILS, VIT C-BIOTIN,) 50 mg -1,250 mcg Chew            No Known Allergies    Past Medical History:   Diagnosis Date     Ulcer       Patient Active Problem List   Diagnosis     Abdominal Pain In The Central Upper Belly (Epigastric)     Serum Enzyme Levels - AST (SGOT) Elevated     Vitamin D deficiency      Morbid obesity (H)         Surgical history: GRAY    Family history: Mother and brother have diabetes, mother has hypertension    Social history: Patient is single.  She lives with her brother.  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  She denies tobacco use.  She drinks alcohol once or twice per month.  Denies illicit drug use.      ROS: Positive for constipation, joint pain, muscle pain, muscle fatigue, sciatica, itching, excessive tiredness.  Specifically negative for bowel/bladder dysfunction, fevers,chills, appetite changes, unexplained weight loss.   Otherwise 13 systems reviewed are negative.  Please see the patient's intake questionnaire from today for details.      OBJECTIVE:  PHYSICAL EXAMINATION:    CONSTITUTIONAL:  Vital signs as above.  No acute distress.  The patient is well nourished and well groomed.  Patient is morbidly obese.  PSYCHIATRIC:  The patient is awake, alert, oriented to person, place, time and answering questions appropriately with clear speech.    HEENT: Normocephalic, atraumatic.  Sclera clear.  Neck is supple.  SKIN:  Skin over the face, bilateral lower extremities, and posterior torso is clean, dry, intact without rashes.    GAIT:  Gait is antalgic.  The patient is able to heel and toe walk bilaterally but reports increased pain with toe walking.  STANDING EXAMINATION: Tender to palpation bilateral lower lumbar paraspinous muscles L5-S1.  Tender to palpation bilateral sacroiliac joints.  Lumbar flexion severely restricted.  Lumbar extension within normal limits.  MUSCLE STRENGTH:  The patient has 5/5 strength for the bilateral hip flexors, knee flexors/extensors, ankle dorsiflexors/plantar flexors, great toe extensors, ankle evertors/invertors.  NEUROLOGICAL:  2/4 patellar, and achilles reflexes bilaterally.  Negative Babinski's bilaterally.  No ankle clonus bilaterally. Sensation to light touch is intact in the bilateral L4, L5, and S1 dermatomes.  VASCULAR:  2/4  dorsalis pedis pulses bilaterally.  Bilateral lower extremities are warm.  There is no pitting edema of the bilateral lower extremities.  ABDOMINAL:  Soft, non-distended, non-tender throughout all quadrants.  No pulsatile mass palpated in the left lower quadrant.  LYMPH NODES:  No palpable or tender inguinal lymph nodes.  MUSCULOSKELETAL: Positive straight leg raise bilaterally.  Negative pelvic distraction test.  Positive thigh thrust bilaterally.  Positive Teo's test bilaterally reproducing pain localizing to the low back/upper buttock.  Positive Gaenslen's test bilaterally.  Positive Yeoman's test right.    RESULTS:  MRI of the lumbar spine from [facility] dated January 27, 2016 was reviewed. It shows mild degenerative changes at L4-5 including a small annular tear and far right disc protrusion with mild mass effect on the exiting right L4 nerve. Please see report for further details.

## 2021-06-12 NOTE — PROGRESS NOTES
[facility] Rehabilitation Daily Progress     Patient Name: Alicia Smith  Date: 10/22/2020  Visit #: 2/16 CHRISTIANNE  Referral Diagnosis: low back pain  Referring provider: Rosa Bentley CNP  Visit Diagnosis:     ICD-10-CM    1. Chronic midline low back pain with bilateral sciatica  M54.41     M54.42     G89.29        Assessment:     Today patient returns for their first follow up following evaluation. The patient reported increases in their symptoms after their evaluation but noted that the HEP helped managed and decrease symptoms. Patient tolerated addition of rotary torso and pilates reformer today with no increase of symptoms in clinic.    The patient would benefit from continuing to  receive skilled physical therapy services as they have responded appropriately to therapeutic exercises and also benefit their  overall physical health.       From Eval:  Patient is a 34 y.o. female that presents with signs and symptoms consistent with R>L low back pain secondary to MRI lumbar spine shows mild degeneration at L4-5 and L5-S1. Patient demonstrates impairments including poor core stability, + SLUMP and SLR on R side, with TTP at R PSIS indicating slight SIJ involvement leading to impaired functional mobility. 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      HEP/POC compliance is  good .  Patient demonstrates understanding/independence with home program.  Patient is appropriate to continue with skilled physical therapy intervention, as indicated by initial plan of care.    Goal Status:  Pt. will be independent with home exercise program in : 6 weeks    Pt will: be able to roll over in bed 4-5x/night without increased low back symptoms or difficulty by 6 weeks.  Pt will: be able to transfer from sit to  her car without increased low back symptoms or difficulty by 6  weeks        Plan / Patient Education:     Continue with initial plan of care.  Progress with home program as tolerated.    Plan for next visit: review HEP, lumbar DE and rotary torso, progress glute/hamstring stretching & strengthening, manual to low back, manual HF stretching, Ktape as needed, core strengthening and abdominal progressions    Subjective:     Pain Rating: 3  Patient reports patient was feeling an increase of symptoms after last visit, due to testing portion, 8/10 pain but today is 3/10      Objective:     Palpation: TTP at R>L PSIS, distal QL and lumbar paraspinals  Flexibility: decreased of hamstrings, HF, gastrocs, QL    Treatment Today         Exercises:  Exercise #1: supine knee rocks - 20 reps  Comment #1: supine or seated piriformis stretch - hold 30 sec  Exercise #2: supine QL lumbar rotation stretch - hold 30 sec  Comment #2: supine or standing HF stretch - hold 30 sec  Exercise #3: supine bridge with hip adduction - 10 reps  Comment #3: rotary torso - 90s, starting to L, 30#  Exercise #4: reformer leg press x 10 [place], SL x 5 each side  Comment #4: reformer bridge x 10 [place]  Exercise #5: reformer lat pull down x 10, 2R        Enter Week / Visit #: W1 V2  Weight (lbs): 103#  Reps (#): 18  ROM (degrees): 0-36  Pain: no increase or decrease of pain        TREATMENT MINUTES COMMENTS   Evaluation     Self-care/ Home management     Manual therapy     Neuromuscular Re-education     Therapeutic Activity     Therapeutic Exercises 26 See above flowsheet  Nustep 3 minutes   Gait training     Modality__________________                Total 26    Blank areas are intentional and mean the treatment did not include these items.       Anabelle Up, PT  10/22/2020

## 2021-06-12 NOTE — PROGRESS NOTES
[facility] Daily Progress     Patient Name: Alicia Smith  Date: 11/9/2020  Visit #: 7/16 CHRISTIANNE  Referral Diagnosis: low back pain  Referring provider: Rosa Bentley CNP  Visit Diagnosis:     ICD-10-CM    1. Chronic midline low back pain with bilateral sciatica  M54.41     M54.42     G89.29        Assessment:     Today patient returns for her 4th week of MedX. Patient is reporting with soreness due to working over the weeked. Patient tolerated all exercises, addition to HEP, and manual therapy reporting decreased soreness at the end of today's session.    The patient would benefit from continuing to  receive skilled physical therapy services as they have responded appropriately to therapeutic exercises and also benefit their overall physical health.       From Eval:  Patient is a 34 y.o. female that presents with signs and symptoms consistent with R>L low back pain secondary to MRI lumbar spine shows mild degeneration at L4-5 and L5-S1. Patient demonstrates impairments including poor core stability, + SLUMP and SLR on R side, with TTP at R PSIS indicating slight SIJ involvement leading to impaired functional mobility. 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      HEP/POC compliance is  good .  Patient demonstrates understanding/independence with home program.  Patient is appropriate to continue with skilled physical therapy intervention, as indicated by initial plan of care.    Goal Status:  Pt. will be independent with home exercise program in : 6 weeks    Pt will: be able to roll over in bed 4-5x/night without increased low back symptoms or difficulty by 6 weeks.  Pt will: be able to transfer from sit to  her car without increased low back symptoms or difficulty by 6 weeks    Plan / Patient Education:     Continue with initial plan of care.  Progress  with home program as tolerated.    Plan for next visit: review HEP, lumbar DE and rotary torso, progress glute/hamstring stretching & strengthening, manual to low back, manual HF stretching, Ktape as needed, core strengthening and abdominal progressions    Subjective:     Pain Rating: Did not report    Patient felt a little sore last Friday but it wasn't too bad. Was sore on Saturday but felt like it's more related to work.   She continues to have a mix of good and bad days.    Objective:     Palpation: TTP at R>L PSIS, distal QL and lumbar paraspinals  Flexibility: decreased of hamstrings, HF, gastrocs, QL    Treatment Today     Exercises:  Exercise #1: supine knee rocks - 20 reps  Comment #1: supine or seated piriformis stretch - hold 30 sec  Exercise #2: supine QL lumbar rotation stretch - hold 30 sec  Comment #2: supine or standing HF stretch - hold 30 sec  Exercise #3: supine bridge with hip adduction - 10 reps  Comment #3: rotary torso - 90s, starting to L, 38#  Exercise #4: reformer leg press x 10 all springs, SL x 5 each side  Comment #4: reformer bridge x 10 all springs  Exercise #5: reformer lat pull down x 10, 2R  Comment #5: supine SLR - 10 reps  Exercise #6: clamshells - 10 reps  Comment #6: standing hip abduction - 10 reps  Exercise #7: abdominal sets with progression  Comment #7: Seated hamstring stretch- 10-20 sec. hold        Enter Week / Visit #: W4V1  Weight (lbs): 120#  Reps (#): 16  ROM (degrees): 0-36  Pain: no increase or decrease of pain        TREATMENT MINUTES COMMENTS   Evaluation     Self-care/ Home management     Manual therapy 9 SL MFR to R hamstring, gluteal and piriformis along sciatic nerve, Hip flexor stretch manually   Neuromuscular Re-education     Therapeutic Activity     Therapeutic Exercises 21 See above flowsheet  Nustep 3 minutes   Gait training     Modality__________________                Total 30    Blank areas are intentional and mean the treatment did not include these  items.     Goldy Arguelles, SPT   I attest that I was present in the room, making skilled assessments and directing the service provided today.  I was responsible for the assessment and treatment of the patient.  During this time, I was not engaged in treating another patient or doing other tasks.  Anabelle Up, PT  11/9/2020

## 2021-06-12 NOTE — TELEPHONE ENCOUNTER
Call to pt with provider's results and recommendations. Pt stated understanding. Pt would like to proceed with [facility]. She is aware order will be placed and she will be contacted to schedule.

## 2021-06-12 NOTE — TELEPHONE ENCOUNTER
----- Message from Jocelyn Lagos PA-C sent at 10/2/2020  8:14 AM CDT -----  Please call patient and let her know that her MRI lumbar spine shows mild degeneration at L4-5 and L5-S1 which is similar to her MRI lumbar spine from 2016.  There is minimal to mild narrowing around nerves at these levels.  I recommend that the patient begin physical therapy and follow-up with me in 3 to 4 weeks.  Please offer the MedX program since she has had traditional physical therapy before.  I am happy to see the patient back in clinic sooner to review the results if she prefers.

## 2021-06-12 NOTE — PROGRESS NOTES
[facility] Daily Progress     Patient Name: Alicia Smith  Date: 11/5/2020  Visit #: 6/16 CHRISTIANNE  Referral Diagnosis: low back pain  Referring provider: Rosa Bentley CNP  Visit Diagnosis:     ICD-10-CM    1. Chronic midline low back pain with bilateral sciatica  M54.41     M54.42     G89.29        Assessment:     Today patient returns for her 5th Med X session. Patient is reporting with a little less symptoms than on Monday. Patient tolerated all exercises and manual therapy well today.    The patient would benefit from continuing to  receive skilled physical therapy services as they have responded appropriately to therapeutic exercises and also benefit their overall physical health.       From Eval:  Patient is a 34 y.o. female that presents with signs and symptoms consistent with R>L low back pain secondary to MRI lumbar spine shows mild degeneration at L4-5 and L5-S1. Patient demonstrates impairments including poor core stability, + SLUMP and SLR on R side, with TTP at R PSIS indicating slight SIJ involvement leading to impaired functional mobility. 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      HEP/POC compliance is  good .  Patient demonstrates understanding/independence with home program.  Patient is appropriate to continue with skilled physical therapy intervention, as indicated by initial plan of care.    Goal Status:  Pt. will be independent with home exercise program in : 6 weeks    Pt will: be able to roll over in bed 4-5x/night without increased low back symptoms or difficulty by 6 weeks.  Pt will: be able to transfer from sit to  her car without increased low back symptoms or difficulty by 6 weeks    Plan / Patient Education:     Continue with initial plan of care.  Progress with home program as tolerated.    Plan for next visit: review HEP,  lumbar DE and rotary torso, progress glute/hamstring stretching & strengthening, manual to low back, manual HF stretching, Ktape as needed, core strengthening and abdominal progressions    Subjective:     Pain Rating: Did not report    Patient is feeling about the same as she did when reporting to the last session, a mix of good and bad days. She felt a little sore after the last session but nothing too bad. She wants to challenge herself with some more strengthening exercises.    Objective:     Palpation: TTP at R>L PSIS, distal QL and lumbar paraspinals  Flexibility: decreased of hamstrings, HF, gastrocs, QL    Treatment Today     Exercises:  Exercise #1: supine knee rocks - 20 reps  Comment #1: supine or seated piriformis stretch - hold 30 sec  Exercise #2: supine QL lumbar rotation stretch - hold 30 sec  Comment #2: supine or standing HF stretch - hold 30 sec  Exercise #3: supine bridge with hip adduction - 10 reps  Comment #3: rotary torso - 90s, starting to R, 38#  Exercise #4: reformer leg press x 10 all springs, SL x 5 each side  Comment #4: reformer bridge x 10 all springs  Exercise #5: reformer lat pull down x 10, 2R  Comment #5: supine SLR - 10 reps  Exercise #6: clamshells - 10 reps  Comment #6: standing hip abduction - 10 reps        Enter Week / Visit #: W3V2  Weight (lbs): 116#  Reps (#): 23  ROM (degrees): 0-36  Pain: no increase or decrease of pain        TREATMENT MINUTES COMMENTS   Evaluation     Self-care/ Home management     Manual therapy 12 SL MFR to R gluteal and piriformis along sciatic nerve, QL stretch manually   Neuromuscular Re-education     Therapeutic Activity     Therapeutic Exercises 15 See above flowsheet  Nustep 3 minutes   Gait training     Modality__________________                Total 27    Blank areas are intentional and mean the treatment did not include these items.     Goldy Arguelles, SPT     I attest that I was present in the room, making skilled assessments and  directing the service provided today.  I was responsible for the assessment and treatment of the patient.  During this time, I was not engaged in treating another patient or doing other tasks.  Anabelle Up, PT  11/5/2020

## 2021-06-12 NOTE — PROGRESS NOTES
[facility]   [place]-Pelvic Initial Evaluation    Patient Name: Alicia Smith  Date of evaluation: 10/19/2020  Referral Diagnosis: Chronic bilateral low back pain with bilateral sciatica  Referring provider: Jocelyn Lagos PA-C  Visit Diagnosis:     ICD-10-CM    1. Chronic midline low back pain with bilateral sciatica  M54.41     M54.42     G89.29        Assessment:        Patient is a 34 y.o. female that presents with signs and symptoms consistent with R>L low back pain secondary to MRI lumbar spine shows mild degeneration at L4-5 and L5-S1. Patient demonstrates impairments including poor core stability, + SLUMP and SLR on R side, with TTP at R PSIS indicating slight SIJ involvement leading to impaired functional mobility. 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 Today patient responded well to patient education, therapeutic exercise and lumbar DE - unable to test today due to computer issues.    Patient educated, demonstrated understanding and is in agreement with nature of impairment, plan of care, patient role and HEP. Patient compliant with PT and prognosis is good. Patient would benefit from skilled PT to progress and improve above impairments.    The POC is dynamic and will be modified on an ongoing basis.  Patient will return to clinic if symptoms persist.  Barriers to achieving goals as noted in the assessment section may affect outcome.  Prognosis to achieve goals is  good   Pt. is appropriate for skilled PT intervention as outlined in the Plan of Care (POC).  Pt. is a good candidate for skilled PT services to improve pain levels and function.    Goals:  Pt. will be independent with home exercise program in : 6 weeks    Pt will: be able to roll over in bed 4-5x/night without increased low back symptoms or difficulty by 6 weeks.  Pt will: be able to  transfer from sit to  her car without increased low back symptoms or difficulty by 6 weeks      Patient's expectations/goals are realistic.    Barriers to Learning or Achieving Goals:  No Barriers.  Chronicity of the problem.       Plan / Patient Instructions:        Plan of Care:   Communication with: Referral Source  Patient Related Instruction: Nature of Condition;Treatment plan and rationale;Self Care instruction;Body mechanics;Posture;Basis of treatment;Next steps;Expected outcome  Times per Week: 1-2  Number of Weeks: 6-8  Number of Visits: 16 MEDX  Discharge Planning: independent HEP and/or plateau of progress  Therapeutic Exercise: ROM;Stretching;Strengthening  Neuromuscular Reeducation: kinesio tape;posture;TNE;core  Manual Therapy: soft tissue mobilization;myofascial release;joint mobilization;muscle energy  Modalities: TENS  Gait Training: as indicated      POC and pathology of condition were reviewed with patient.  Pt. is in agreement with the Plan of Care  A Home Exercise Program (HEP) was initiated today.  Pt. was instructed in exercises by PT and patient was given a handout with detailed instructions.    Plan for next visit: review HEP, lumbar DE and rotary torso, progress glute/hamstring stretching & strengthening, manual to low back, manual HF stretching, Ktape as needed, core strengthening and abdominal progressions     Subjective:         History of Present Illness:    Alicia is a 34 y.o. female who presents to therapy today with complaints of sharp shooting back pain. Date of onset is September 2020 and onset was gradual and chronic pain but this is a different type of pain. Symptoms are constant and not improving. Patient reports she gets pain with activity she does, pain stays there for awhile about 100ft of walking before the pain decreases, pain goes down her legs to about mid thigh. She reports  a constant history of similar symptoms. She describes their previous level of function as  not limited. Patient is a Nurse.     Pain description: shooting, soreness and sideways movement getting inout of a car, rolling over in bed    Functional limitations are described as occurring with:   bending  lifting  performing routine daily activities  sleeping  standing for longer periods of time  transitional movements getting in  chair and car and getting out of  chair   Patient having difficulty with stairs too - feeling weakness the most - she has stairs at home    Patient reports no benefit from anything - meds          Objective:      Note: Items left blank indicates the item was not performed or not indicated at the time of the evaluation.      Examination  1. Chronic midline low back pain with bilateral sciatica       Involved side: Right  Posture Observation:      General sitting posture is  normal.  General standing posture is normal.    Lumbar ROM:    Date: 10/19/2020     *Indicate scale AROM AROM AROM   Lumbar Flexion Fingers to shins with P on R     Lumbar Extension Limited with P at end range      Right Left Right Left Right Left   Lumbar Sidebending P        Lumbar Rotation P        Thoracic Flexion      Thoracic Extension      Thoracic Sidebending         Thoracic Rotation           Lower Extremity Strength:   WFL no increase of symptoms    Lumbar Special Tests:     Lumbar Special Tests Right Left SI Tests Right  Left   Quadrant test   SI Compression     Straight leg raise + - SI Distraction     Crossover response   POSH Test     Slump + - Sacral Thrust     Sit-up test  FADIR + -   Trunk extensor endurance test  Resisted Abduction     Prone instability test  Other:     Pubic shotgun  Other:       Repeated Motion Testing:  Does not centralize  Does not peripheralize    Passive Mobility - Joint Integrity:  Hypomobile    Palpation: TTP at R>L PSIS, distal QL and lumbar paraspinals  Flexibility: decreased of hamstrings, HF, gastrocs, QL      Treatment Today       Patient Instruction:  EDU on activity  modification and exercise consistency in order to see progress and change      Exercises:  Exercise #1: supine knee rocks - 20 reps  Comment #1: supine or seated piriformis stretch - hold 30 sec  Exercise #2: supine QL lumbar rotation stretch - hold 30 sec  Comment #2: supine or standing HF stretch - hold 30 sec  Exercise #3: supine bridge with hip adduction - 10 reps          TREATMENT MINUTES COMMENTS   Evaluation 20    Self-care/ Home management     Manual therapy     Neuromuscular Re-education     Therapeutic Activity     Therapeutic Exercises 25 See flowsheet    [id number]   Gait training     Modality__________________                Total 45    Blank areas are intentional and mean the treatment did not include these items.     PT Evaluation Code: (Please list factors)  Patient History/Comorbidities:   Patient Active Problem List   Diagnosis     Abdominal Pain In The Central Upper Belly (Epigastric)     Serum Enzyme Levels - AST (SGOT) Elevated     Vitamin D deficiency     Morbid obesity (H)       Examination: decreased mobility increased pain  Clinical Presentation: stable  Clinical Decision Making: low    Patient History/  Comorbidities Examination  (body structures and functions, activity limitations, and/or participation restrictions) Clinical Presentation Clinical Decision Making (Complexity)   No documented Comorbidities or personal factors 1-2 Elements Stable and/or uncomplicated Low   1-2 documented comorbidities or personal factor 3 Elements Evolving clinical presentation with changing characteristics Moderate   3-4 documented comorbidities or personal factors 4 or more Unstable and unpredictable High                Anabelle Up, PT  10/19/2020  7:54 AM

## 2021-06-12 NOTE — PROGRESS NOTES
[facility] Daily Progress     Patient Name: Alicia Smith  Date: 11/2/2020  Visit #: 5/16 CHRISTIANNE  Referral Diagnosis: low back pain  Referring provider: Rosa Bentley CNP  Visit Diagnosis:     ICD-10-CM    1. Chronic midline low back pain with bilateral sciatica  M54.41     M54.42     G89.29        Assessment:     Today patient returns for her 5th Med X session. Patient tolerated all exercises and manual therapy well today. The patient did still demonstrate increased hip flexor tightness on the right side.     The patient would benefit from continuing to  receive skilled physical therapy services as they have responded appropriately to therapeutic exercises and also benefit their overall physical health.       From Eval:  Patient is a 34 y.o. female that presents with signs and symptoms consistent with R>L low back pain secondary to MRI lumbar spine shows mild degeneration at L4-5 and L5-S1. Patient demonstrates impairments including poor core stability, + SLUMP and SLR on R side, with TTP at R PSIS indicating slight SIJ involvement leading to impaired functional mobility. 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      HEP/POC compliance is  good .  Patient demonstrates understanding/independence with home program.  Patient is appropriate to continue with skilled physical therapy intervention, as indicated by initial plan of care.    Goal Status:  Pt. will be independent with home exercise program in : 6 weeks    Pt will: be able to roll over in bed 4-5x/night without increased low back symptoms or difficulty by 6 weeks.  Pt will: be able to transfer from sit to  her car without increased low back symptoms or difficulty by 6 weeks    Plan / Patient Education:     Continue with initial plan of care.  Progress with home program as tolerated.    Plan for next  visit: review HEP, lumbar DE and rotary torso, progress glute/hamstring stretching & strengthening, manual to low back, manual HF stretching, Ktape as needed, core strengthening and abdominal progressions    Subjective:     Pain Ratin    Patient is feeling okay today and has been feeling a little better in the mornings.On her days off, afternoons and evenings have been a little more painful which she attributes to her moving less during the day. When she attempted to do all of her strengthening exercises from her HEP she noticed a significant increase in symptoms. She has now returned to completing one strengthening exercise today. She is feeling her symptoms primarily in the back of the leg.     Objective:     Palpation: TTP at R>L PSIS, distal QL and lumbar paraspinals  Flexibility: decreased of hamstrings, HF, gastrocs, QL    Treatment Today     Exercises:  Exercise #1: supine knee rocks - 20 reps  Comment #1: supine or seated piriformis stretch - hold 30 sec  Exercise #2: supine QL lumbar rotation stretch - hold 30 sec  Comment #2: supine or standing HF stretch - hold 30 sec  Exercise #3: supine bridge with hip adduction - 10 reps  Comment #3: rotary torso - 90s, starting to L, 36#  Exercise #4: reformer leg press x 10 all springs, SL x 5 each side  Comment #4: reformer bridge x 10 all springs  Exercise #5: reformer lat pull down x 10, 2R  Comment #5: supine SLR - 10 reps  Exercise #6: clamshells - 10 reps  Comment #6: standing hip abduction - 10 reps        Enter Week / Visit #: W3V1  Weight (lbs): 113#  Reps (#): 18  ROM (degrees): 0-36  Pain: no increase or decrease of pain        TREATMENT MINUTES COMMENTS   Evaluation     Self-care/ Home management     Manual therapy 12 Prone MFR to R gluteal and piriformis along sciatic nerve, QL stretch manually   Neuromuscular Re-education     Therapeutic Activity     Therapeutic Exercises 15 See above flowsheet  Nustep 3 minutes   Gait training      Modality__________________                Total 27    Blank areas are intentional and mean the treatment did not include these items.     Goldy Arguelles, SPT     I attest that I was present in the room, making skilled assessments and directing the service provided today.  I was responsible for the assessment and treatment of the patient.  During this time, I was not engaged in treating another patient or doing other tasks.  Anabelle Up, PT  11/2/2020

## 2021-06-12 NOTE — PROGRESS NOTES
[facility] Rehabilitation Daily Progress     Patient Name: Alicia Smith  Date: 10/26/2020  Visit #: 3/16 CHRISTIANNE  Referral Diagnosis: low back pain  Referring provider: Rosa Bentley CNP  Visit Diagnosis:     ICD-10-CM    1. Chronic midline low back pain with bilateral sciatica  M54.41     M54.42     G89.29        Assessment:     Today patient returns for her 3rd Med X session. The patient reported increases in their symptoms after their last session and noted the pain was so severe that she had to rest all weekend. Patient tolerated all exercises today without increases in symptoms. The patient did still demonstrate increased hip flexor tightness on the right side.     The patient would benefit from continuing to  receive skilled physical therapy services as they have responded appropriately to therapeutic exercises and also benefit their  overall physical health.       From Eval:  Patient is a 34 y.o. female that presents with signs and symptoms consistent with R>L low back pain secondary to MRI lumbar spine shows mild degeneration at L4-5 and L5-S1. Patient demonstrates impairments including poor core stability, + SLUMP and SLR on R side, with TTP at R PSIS indicating slight SIJ involvement leading to impaired functional mobility. 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      HEP/POC compliance is  good .  Patient demonstrates understanding/independence with home program.  Patient is appropriate to continue with skilled physical therapy intervention, as indicated by initial plan of care.    Goal Status:  Pt. will be independent with home exercise program in : 6 weeks    Pt will: be able to roll over in bed 4-5x/night without increased low back symptoms or difficulty by 6 weeks.  Pt will: be able to transfer from sit to  her car without increased low back symptoms or  difficulty by 6 weeks        Plan / Patient Education:     Continue with initial plan of care.  Progress with home program as tolerated.    Plan for next visit: review HEP, lumbar DE and rotary torso, progress glute/hamstring stretching & strengthening, manual to low back, manual HF stretching, Ktape as needed, core strengthening and abdominal progressions    Subjective:     Pain Ratin    Patient reports she was feeling increased soreness after last visit, reporting a pain level of 8-9/10 over the weekend without activity. The patient reports that she is feeling more stiff than normal today. She reports that the home exercises are going okay but is experiencing some difficulty with bridging. She is primarily feeling her symptoms in her left hip and down the back of her left leg. The pain was relieved slightly by over the counter pain medication and with rest.       Objective:     Palpation: TTP at R>L PSIS, distal QL and lumbar paraspinals  Flexibility: decreased of hamstrings, HF, gastrocs, QL    Treatment Today         Exercises:  Exercise #1: supine knee rocks - 20 reps  Comment #1: supine or seated piriformis stretch - hold 30 sec  Exercise #2: supine QL lumbar rotation stretch - hold 30 sec  Comment #2: supine or standing HF stretch - hold 30 sec  Exercise #3: supine bridge with hip adduction - 10 reps  Comment #3: rotary torso - 90s, starting to R, 32#  Exercise #4: reformer leg press x 10 all springs, SL x 5 each side  Comment #4: reformer bridge x 10 all springs  Exercise #5: reformer lat pull down x 10, 2R  Comment #5: supine SLR - 10 reps  Exercise #6: clamshells - 10 reps  Comment #6: standing hip abduction - 10 reps        Enter Week / Visit #: W2V1  Weight (lbs): 107#  Reps (#): 15  ROM (degrees): 0-36  Pain: no increase or decrease of pain        TREATMENT MINUTES COMMENTS   Evaluation     Self-care/ Home management     Manual therapy 10 SL MFR of R glute and ITB - TTP that decreased with symptoms    Neuromuscular Re-education     Therapeutic Activity     Therapeutic Exercises 15 See above flowsheet  Nustep 3 minutes   Gait training     Modality__________________                Total 25    Blank areas are intentional and mean the treatment did not include these items.     Goldy Arguelles, SPT     I attest that I was present in the room, making skilled assessments and directing the service provided today.  I was responsible for the assessment and treatment of the patient.  During this time, I was not engaged in treating another patient or doing other tasks.  Anabelle Up, PT  10/26/2020

## 2021-06-12 NOTE — PROGRESS NOTES
[facility] Rehabilitation Daily Progress     Patient Name: Alicia Smith  Date: 10/29/2020  Visit #: 4/16 CHRISTIANNE  Referral Diagnosis: low back pain  Referring provider: Rosa Bentley CNP  Visit Diagnosis:     ICD-10-CM    1. Chronic midline low back pain with bilateral sciatica  M54.41     M54.42     G89.29        Assessment:     Today patient returns for her 4th Med X session. Patient reports that symptoms are worst upon waking and it takes a few hours to go away. Patient tolerated all exercises and manual therapy today without increases in symptoms. The patient did still demonstrate increased hip flexor tightness on the right side. Educated patient on the progress we want with therapy and how they should monitor their symptoms to track their progress.    The patient would benefit from continuing to  receive skilled physical therapy services as they have responded appropriately to therapeutic exercises and also benefit their overall physical health.       From Eval:  Patient is a 34 y.o. female that presents with signs and symptoms consistent with R>L low back pain secondary to MRI lumbar spine shows mild degeneration at L4-5 and L5-S1. Patient demonstrates impairments including poor core stability, + SLUMP and SLR on R side, with TTP at R PSIS indicating slight SIJ involvement leading to impaired functional mobility. 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      HEP/POC compliance is  good .  Patient demonstrates understanding/independence with home program.  Patient is appropriate to continue with skilled physical therapy intervention, as indicated by initial plan of care.    Goal Status:  Pt. will be independent with home exercise program in : 6 weeks    Pt will: be able to roll over in bed 4-5x/night without increased low back symptoms or difficulty by 6 weeks.  Pt will: be  able to transfer from sit to  her car without increased low back symptoms or difficulty by 6 weeks        Plan / Patient Education:     Continue with initial plan of care.  Progress with home program as tolerated.    Plan for next visit: review HEP, lumbar DE and rotary torso, progress glute/hamstring stretching & strengthening, manual to low back, manual HF stretching, Ktape as needed, core strengthening and abdominal progressions    Subjective:     Pain Rating: 3    Patient reports that the last few days have been so so. When she wakes up in the morning she experiences a lot of sharp pain, 7-8/10, that goes away after a few hours. Patient feels like the constant movement at work helps reduce her symptoms but she does feel her symptoms limit her for the first hour or two of work. Patient reports she was feeling soreness after last visit but is was manageable.    Objective:     Palpation: TTP at R>L PSIS, distal QL and lumbar paraspinals  Flexibility: decreased of hamstrings, HF, gastrocs, QL    Treatment Today         Exercises:  Exercise #1: supine knee rocks - 20 reps  Comment #1: supine or seated piriformis stretch - hold 30 sec  Exercise #2: supine QL lumbar rotation stretch - hold 30 sec  Comment #2: supine or standing HF stretch - hold 30 sec  Exercise #3: supine bridge with hip adduction - 10 reps  Comment #3: rotary torso - 90s, starting to L, 34#  Exercise #4: reformer leg press x 10 all springs, SL x 5 each side  Comment #4: reformer bridge x 10 all springs  Exercise #5: reformer lat pull down x 10, 2R  Comment #5: supine SLR - 10 reps  Exercise #6: clamshells - 10 reps  Comment #6: standing hip abduction - 10 reps        Enter Week / Visit #: Wk2 V2  Weight (lbs): 110#  Reps (#): 17  ROM (degrees): 0-36  Pain: no increase or decrease of pain        TREATMENT MINUTES COMMENTS   Evaluation     Self-care/ Home management     Manual therapy 12 Prone MFR to R gluteal and piriformis along sciatic  nerve, QL stretch manually   Neuromuscular Re-education     Therapeutic Activity     Therapeutic Exercises 15 See above flowsheet  Nustep 3 minutes   Gait training     Modality__________________                Total 27    Blank areas are intentional and mean the treatment did not include these items.     Goldy Arguelles, SPT     I attest that I was present in the room, making skilled assessments and directing the service provided today.  I was responsible for the assessment and treatment of the patient.  During this time, I was not engaged in treating another patient or doing other tasks.    Anabelle Up, PT  10/29/2020

## 2021-06-13 NOTE — PROGRESS NOTES
[facility] Rehabilitation Daily Progress     Patient Name: Alicia Smith  Date: 11/30/2020  Visit #: 11/16 CHRISTIANNE  Referral Diagnosis: low back pain  Referring provider: Rosa Bentley CNP  Visit Diagnosis:     ICD-10-CM    1. Chronic midline low back pain with bilateral sciatica  M54.41     M54.42     G89.29        Assessment:     Today patient returns for her 7th week of MedX. She reports that this last week has been the best week she has had in a long time. Did not report with symptoms today. Manual therapy was beneficial last session to address bilateral hip flexor tightness. Patient tolerated all exercises and manual therapy reporting decreased soreness at the end of today's session.     The patient would benefit from continuing to  receive skilled physical therapy services as they have responded appropriately to therapeutic exercises and also benefit their overall physical health.       From Eval:  Patient is a 34 y.o. female that presents with signs and symptoms consistent with R>L low back pain secondary to MRI lumbar spine shows mild degeneration at L4-5 and L5-S1. Patient demonstrates impairments including poor core stability, + SLUMP and SLR on R side, with TTP at R PSIS indicating slight SIJ involvement leading to impaired functional mobility. 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      HEP/POC compliance is  good .  Patient demonstrates understanding/independence with home program.  Patient is appropriate to continue with skilled physical therapy intervention, as indicated by initial plan of care.    Goal Status:  Pt. will be independent with home exercise program in : 6 weeks    Pt will: be able to roll over in bed 4-5x/night without increased low back symptoms or difficulty by 6 weeks.  Pt will: be able to transfer from sit to  her car without increased  low back symptoms or difficulty by 6 weeks    Plan / Patient Education:     Continue with initial plan of care.  Progress with home program as tolerated.    Plan for next visit: review HEP, lumbar DE and rotary torso, progress glute/hamstring stretching & strengthening, manual to low back, manual HF stretching, Ktape as needed, core strengthening and abdominal progressions    Subjective:     Pain Rating: Did not report    This week has been better, the intensity of symptoms has been down. HEP continues to go well and is comfortable with what she has at this time. Sleeping has been better.    Objective:     Palpation: TTP at R>L PSIS, distal QL and lumbar paraspinals  Flexibility: decreased of hamstrings, HF, gastrocs, QL    Treatment Today     Exercises:  Exercise #1: supine knee rocks - 20 reps  Comment #1: supine or seated piriformis stretch - hold 30 sec  Exercise #2: supine QL lumbar rotation stretch - hold 30 sec  Comment #2: supine or standing HF stretch - hold 30 sec  Exercise #3: supine bridge with hip adduction - 10 reps  Comment #3: rotary torso - 90s, starting to L, 46#  Exercise #4: reformer leg press x 10 all springs, SL x 5 each side  Comment #4: reformer bridge x 10 all springs  Exercise #5: reformer lat pull down x 10, 2R  Comment #5: supine SLR - 10 reps  Exercise #6: clamshells - 10 reps - took out of HEP  Comment #6: standing hip abduction - 10 reps  Exercise #7: abdominal sets with progression  Comment #7: Seated hamstring stretch- 10-20 sec. hold        Enter Week / Visit #: W7  Weight (lbs): 136#  Reps (#): 10  ROM (degrees): 0-36  Pain: fatigued        TREATMENT MINUTES COMMENTS   Evaluation     Self-care/ Home management     Manual therapy 12 Supine: MFR to bilateral hip flexors    Not Today: Prone MFR to R hamstring, ,calf, and hip adductors  Quad, Hip flexor, and hip internal/external rotator stretch manually   Neuromuscular Re-education     Therapeutic Activity     Therapeutic Exercises 16  See above flowsheet  Nustep 3 minutes   Gait training     Modality__________________                Total 28    Blank areas are intentional and mean the treatment did not include these items.     Goldy Arguelles, SPT   I attest that I was present in the room, making skilled assessments and directing the service provided today.  I was responsible for the assessment and treatment of the patient.  During this time, I was not engaged in treating another patient or doing other tasks.  Anabelle Up, PT  11/30/2020

## 2021-06-13 NOTE — PROGRESS NOTES
Assessment and Plan:     1. Abdominal bloating  HM2(CBC w/o Differential)    Comprehensive Metabolic Panel    Lipase    H. pylori Antigen, Stool    omeprazole (PRILOSEC) 20 MG capsule   2. Constipation, unspecified constipation type  HM2(CBC w/o Differential)    Comprehensive Metabolic Panel    Lipase    H. pylori Antigen, Stool     Differentials include constipation, cholelithiasis, pancreatitis, gastritis, gastric ulcer, H pylori, food intolerance, GERD.  Will check hemogram, CMP, lipase, and H. pylori.  Did offer abdominal ultrasound to rule out cholelithiasis, but she declines.  Provided Omeprazole 20 mg daily.  Discussed treatment of constipation with over-the-counter laxative or magnesium citrate.  She will see if this provides assistance.  If pain or fever develops, she will notify the clinic or present to the emergency room.  She is content with the plan.    Subjective:     Alicia is a 31 y.o. female presenting to the clinic for concerns for abdominal bloating for 3 weeks.  Patient states the bloating has been intermittent.  She complains of constipation where she is straining to have bowel movements.  She is still having bowel movements once daily and she states they are medium in size.  She feels a tightness across her upper abdomen but denies any pain.  Patient's symptoms became more constant on Friday.  She denies any blood or mucus in the stool.  She has had nausea without vomiting.  She denies dysuria, hematuria, low back pain, fever.  She currently has her menstrual period.  She has found no correlation with the bloating and consumption of food.  She has not been taking any NSAIDs recently.  She denies any recent travel.  In 2013 she was diagnosed with a gastric ulcer and was treated with omeprazole.  She has restarted the omeprazole.    Review of Systems: A complete 14 point review of systems was obtained and is negative or as stated in the history of present illness.    Social History     Social  History     Marital status: Single     Spouse name: N/A     Number of children: N/A     Years of education: N/A     Occupational History     Not on file.     Social History Main Topics     Smoking status: Never Smoker     Smokeless tobacco: Not on file     Alcohol use No     Drug use: No     Sexual activity: Not on file     Other Topics Concern     Not on file     Social History Narrative       Active Ambulatory Problems     Diagnosis Date Noted     Abdominal Pain In The [place] Upper Belly (Epigastric)      Serum Enzyme Levels - AST (SGOT) Elevated      Abdominal Pain      Resolved Ambulatory Problems     Diagnosis Date Noted     No Resolved Ambulatory Problems     No Additional Past Medical History       Family History   Problem Relation Age of Onset     Hypertension         Objective:     There were no vitals taken for this visit.    Patient is alert, in no obvious distress.   Skin: Warm, dry.  No lesions or rashes.  Skin turgor rapid return.   HEENT:  Head normocephalic, atraumatic.  Eyes normal. Ears normal.  Nose patent, mucosa pink.  Oropharynx mucosa pink.  No lesions or tonsillar enlargement.   Neck: Supple, no lymphadenopathy.   Lungs:  Clear to auscultation. Respirations even and unlabored.  No wheezing or rales noted.   Heart:  Regular rate and rhythm.  No murmurs.  Abdomen: Soft, nontender.  No organomegaly. Bowel sounds normoactive. No guarding or masses noted.

## 2021-06-13 NOTE — PROGRESS NOTES
[facility] Rehabilitation Daily Progress     Patient Name: Alicia Smith  Date: 12/21/2020  Visit #: 14/16 CHRISTIANNE  Referral Diagnosis: low back pain  Referring provider: Rosa Bentley CNP  Visit Diagnosis:     ICD-10-CM    1. Chronic midline low back pain with bilateral sciatica  M54.41     M54.42     G89.29        Assessment:     Today patient returns for her 9th week of MedX. She reports that she has experienced some recent soreness without any increases in activity. However, the last week was a good week. Patient tolerated all exercises and manual therapy reporting decreased soreness at the end of today's session.     The patient would benefit from continuing to  receive skilled physical therapy services as they have responded appropriately to therapeutic exercises and also benefit their overall physical health.       From Eval:  Patient is a 34 y.o. female that presents with signs and symptoms consistent with R>L low back pain secondary to MRI lumbar spine shows mild degeneration at L4-5 and L5-S1. Patient demonstrates impairments including poor core stability, + SLUMP and SLR on R side, with TTP at R PSIS indicating slight SIJ involvement leading to impaired functional mobility. 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      HEP/POC compliance is  good .  Patient demonstrates understanding/independence with home program.  Patient is appropriate to continue with skilled physical therapy intervention, as indicated by initial plan of care.    Goal Status:  Pt. will be independent with home exercise program in : 6 weeks    Pt will: be able to roll over in bed 4-5x/night without increased low back symptoms or difficulty by 6 weeks.  Pt will: be able to transfer from sit to  her car without increased low back symptoms or difficulty by 6 weeks    Plan / Patient Education:      Continue with initial plan of care.  Progress with home program as tolerated.    Plan for next visit: review HEP, lumbar DE and rotary torso, progress glute/hamstring stretching & strengthening, manual to low back, manual HF stretching, Ktape as needed, core strengthening and abdominal progressions    Subjective:     After Monday she was a little bit sore, work has been stressful and busy, that impacts her pain a bit. Still sit to stand transitions are bothersome for her. Patient has been feeling it into the R glute and partway down into the thigh.     Objective:     Palpation: TTP at R>L PSIS, distal QL and lumbar paraspinals  Flexibility: decreased of hamstrings, HF, gastrocs, QL    Treatment Today     Exercises:  Exercise #1: supine knee rocks - 20 reps  Comment #1: supine or seated piriformis stretch - hold 30 sec  Exercise #2: supine QL lumbar rotation stretch - hold 30 sec  Comment #2: supine or standing HF stretch - hold 30 sec  Exercise #3: supine bridge with hip adduction - 10 reps  Comment #3: rotary torso - 90s, starting to R, 50#  Exercise #4: reformer leg press x 10 all springs, SL x 5 each side  Comment #4: reformer bridge x 10 all springs  Exercise #5: reformer lat pull down x 10, 2R  Comment #5: supine SLR - 10 reps  Exercise #6: clamshells - 10 reps - took out of HEP  Comment #6: standing hip abduction - 10 reps  Exercise #7: abdominal sets with progression  Comment #7: Seated hamstring stretch- 10-20 sec. hold  Exercise #8: IT band stretch standing and supine - hold 30 sec  Comment #8: hip abd/extension orange band - 10-20 reps        Enter Week / Visit #: wk 10  Weight (lbs): 141#  Reps (#): 10  ROM (degrees): 0-36  Pain: fatigued        TREATMENT MINUTES COMMENTS   Evaluation     Self-care/ Home management     Manual therapy 12 Supine: MFR to ITB,TFL, and bilateral hip flexors; TTP around the lateral L knee  SL on L: MFR to R hamstring, ,calf, and hip adductors  Quad, Hip flexor, and hip  internal/external rotator stretch manually   Neuromuscular Re-education     Therapeutic Activity     Therapeutic Exercises 16 See above flowsheet  Nustep 3 minutes   Gait training     Modality__________________                Total 28    Blank areas are intentional and mean the treatment did not include these items.       Anabelle Up, PT  12/21/2020

## 2021-06-13 NOTE — PROGRESS NOTES
[facility] Rehabilitation Daily Progress     Patient Name: Alicia Smith  Date: 11/23/2020  Visit #: 10/16 CHRISTIANNE  Referral Diagnosis: low back pain  Referring provider: Rosa Bentley CNP  Visit Diagnosis:     ICD-10-CM    1. Chronic midline low back pain with bilateral sciatica  M54.41     M54.42     G89.29        Assessment:     Today patient returns for her 6th week of MedX. Patient presented with tightness again in her bilateral hip flexors. She is doing okay with her HEP and does not want to add to it at this time. Patient tolerated all exercises, addition to HEP, and manual therapy reporting decreased soreness at the end of today's session.    The patient would benefit from continuing to  receive skilled physical therapy services as they have responded appropriately to therapeutic exercises and also benefit their overall physical health.       From Eval:  Patient is a 34 y.o. female that presents with signs and symptoms consistent with R>L low back pain secondary to MRI lumbar spine shows mild degeneration at L4-5 and L5-S1. Patient demonstrates impairments including poor core stability, + SLUMP and SLR on R side, with TTP at R PSIS indicating slight SIJ involvement leading to impaired functional mobility. 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      HEP/POC compliance is  good .  Patient demonstrates understanding/independence with home program.  Patient is appropriate to continue with skilled physical therapy intervention, as indicated by initial plan of care.    Goal Status:  Pt. will be independent with home exercise program in : 6 weeks    Pt will: be able to roll over in bed 4-5x/night without increased low back symptoms or difficulty by 6 weeks.  Pt will: be able to transfer from sit to  her car without increased low back symptoms or difficulty by 6  weeks    Plan / Patient Education:     Continue with initial plan of care.  Progress with home program as tolerated.    Plan for next visit: review HEP, lumbar DE and rotary torso, progress glute/hamstring stretching & strengthening, manual to low back, manual HF stretching, Ktape as needed, core strengthening and abdominal progressions    Subjective:     Pain Rating: Did not report    She is feeling tight today and reports no increase in activity. Felt okay after the last session and HEP has been going okay. She has been able to progress toward some strengthening exercises. Feels comfortable with her current HEP.    Objective:     Palpation: TTP at R>L PSIS, distal QL and lumbar paraspinals  Flexibility: decreased of hamstrings, HF, gastrocs, QL    Treatment Today     Exercises:  Exercise #1: supine knee rocks - 20 reps  Comment #1: supine or seated piriformis stretch - hold 30 sec  Exercise #2: supine QL lumbar rotation stretch - hold 30 sec  Comment #2: supine or standing HF stretch - hold 30 sec  Exercise #3: supine bridge with hip adduction - 10 reps  Comment #3: rotary torso - 90s, starting to L, 42#  Exercise #4: reformer leg press x 10 all springs, SL x 5 each side  Comment #4: reformer bridge x 10 all springs  Exercise #5: reformer lat pull down x 10, 2R  Comment #5: supine SLR - 10 reps  Exercise #6: clamshells - 10 reps - took out of HEP  Comment #6: standing hip abduction - 10 reps  Exercise #7: abdominal sets with progression  Comment #7: Seated hamstring stretch- 10-20 sec. hold        Enter Week / Visit #: W6  Weight (lbs): 132#  Reps (#): 13  ROM (degrees): 0-36  Pain: no increase or decrease of pain        TREATMENT MINUTES COMMENTS   Evaluation     Self-care/ Home management     Manual therapy 12 Supine: MFR to bilateral hip flexors    Not Today: Prone MFR to R hamstring, ,calf, and hip adductors  Quad, Hip flexor, and hip internal/external rotator stretch manually   Neuromuscular Re-education      Therapeutic Activity     Therapeutic Exercises 16 See above flowsheet  Nustep 3 minutes   Gait training     Modality__________________                Total 28    Blank areas are intentional and mean the treatment did not include these items.     Goldy Arguelles, SPT   I attest that I was present in the room, making skilled assessments and directing the service provided today.  I was responsible for the assessment and treatment of the patient.  During this time, I was not engaged in treating another patient or doing other tasks.  Anabelle Up, PT  11/23/2020

## 2021-06-13 NOTE — PROGRESS NOTES
[facility] Rehabilitation Daily Progress     Patient Name: Alicia Smith  Date: 11/19/2020  Visit #: 9/16 CHRISTIANNE  Referral Diagnosis: low back pain  Referring provider: Rosa Bentley CNP  Visit Diagnosis:     ICD-10-CM    1. Chronic midline low back pain with bilateral sciatica  M54.41     M54.42     G89.29        Assessment:     Today patient returns for her 5th week of MedX. Patient presented with tightness today but reporting that the last few days were very good. She is still feeling her symptoms more down the hamstrings rather than the glutes. Patient tolerated all exercises, addition to HEP, and manual therapy reporting decreased soreness at the end of today's session.    The patient would benefit from continuing to  receive skilled physical therapy services as they have responded appropriately to therapeutic exercises and also benefit their overall physical health.       From Eval:  Patient is a 34 y.o. female that presents with signs and symptoms consistent with R>L low back pain secondary to MRI lumbar spine shows mild degeneration at L4-5 and L5-S1. Patient demonstrates impairments including poor core stability, + SLUMP and SLR on R side, with TTP at R PSIS indicating slight SIJ involvement leading to impaired functional mobility. 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      HEP/POC compliance is  good .  Patient demonstrates understanding/independence with home program.  Patient is appropriate to continue with skilled physical therapy intervention, as indicated by initial plan of care.    Goal Status:  Pt. will be independent with home exercise program in : 6 weeks    Pt will: be able to roll over in bed 4-5x/night without increased low back symptoms or difficulty by 6 weeks.  Pt will: be able to transfer from sit to  her car without increased low back  symptoms or difficulty by 6 weeks    Plan / Patient Education:     Continue with initial plan of care.  Progress with home program as tolerated.    Plan for next visit: review HEP, lumbar DE and rotary torso, progress glute/hamstring stretching & strengthening, manual to low back, manual HF stretching, Ktape as needed, core strengthening and abdominal progressions    Subjective:     Pain Ratin- from buttock down     The weekend was rough pain wise. I woke up sore and stayed sore. Hard to say why it happened. Monday, Tuesday, and Wednesday I actually felt really good and probably the best in several months. I woke up this morning really tight. I've used some OTC medicine and stretching to try to help with the pain.      Objective:     Palpation: TTP at R>L PSIS, distal QL and lumbar paraspinals  Flexibility: decreased of hamstrings, HF, gastrocs, QL    Treatment Today     Exercises:  Exercise #1: supine knee rocks - 20 reps  Comment #1: supine or seated piriformis stretch - hold 30 sec  Exercise #2: supine QL lumbar rotation stretch - hold 30 sec  Comment #2: supine or standing HF stretch - hold 30 sec  Exercise #3: supine bridge with hip adduction - 10 reps  Comment #3: rotary torso - 90s, starting to L, 42#  Exercise #4: reformer leg press x 10 all springs, SL x 5 each side  Comment #4: reformer bridge x 10 all springs  Exercise #5: reformer lat pull down x 10, 2R  Comment #5: supine SLR - 10 reps  Exercise #6: clamshells - 10 reps - took out of HEP  Comment #6: standing hip abduction - 10 reps  Exercise #7: abdominal sets with progression  Comment #7: Seated hamstring stretch- 10-20 sec. hold        Enter Week / Visit #: W5  Weight (lbs): 129#  Reps (#): 13  ROM (degrees): 0-36  Pain: no increase or decrease of pain        TREATMENT MINUTES COMMENTS   Evaluation     Self-care/ Home management     Manual therapy 18 Prone MFR to R hamstring, ,calf, and hip adductors  Quad, Hip flexor, and hip internal/external  rotator stretch manually   Neuromuscular Re-education     Therapeutic Activity     Therapeutic Exercises 12 See above flowsheet  Nustep 3 minutes   Gait training     Modality__________________                Total 30    Blank areas are intentional and mean the treatment did not include these items.     Goldy Arguelles, SPT   I attest that I was present in the room, making skilled assessments and directing the service provided today.  I was responsible for the assessment and treatment of the patient.  During this time, I was not engaged in treating another patient or doing other tasks.  Anabelle Up, PT  11/19/2020

## 2021-06-13 NOTE — PROGRESS NOTES
[facility] Rehabilitation Daily Progress     Patient Name: Alicia Smith  Date: 12/14/2020  Visit #: 13/16 CHRISTIANNE  Referral Diagnosis: low back pain  Referring provider: Rosa Bentley CNP  Visit Diagnosis:     ICD-10-CM    1. Chronic midline low back pain with bilateral sciatica  M54.41     M54.42     G89.29        Assessment:     Today patient returns for her 9th week of MedX. She reports that she has experienced some recent soreness without any increases in activity. However, the last week was a good week. Patient tolerated all exercises and manual therapy reporting decreased soreness at the end of today's session.     The patient would benefit from continuing to  receive skilled physical therapy services as they have responded appropriately to therapeutic exercises and also benefit their overall physical health.       From Eval:  Patient is a 34 y.o. female that presents with signs and symptoms consistent with R>L low back pain secondary to MRI lumbar spine shows mild degeneration at L4-5 and L5-S1. Patient demonstrates impairments including poor core stability, + SLUMP and SLR on R side, with TTP at R PSIS indicating slight SIJ involvement leading to impaired functional mobility. 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      HEP/POC compliance is  good .  Patient demonstrates understanding/independence with home program.  Patient is appropriate to continue with skilled physical therapy intervention, as indicated by initial plan of care.    Goal Status:  Pt. will be independent with home exercise program in : 6 weeks    Pt will: be able to roll over in bed 4-5x/night without increased low back symptoms or difficulty by 6 weeks.  Pt will: be able to transfer from sit to  her car without increased low back symptoms or difficulty by 6 weeks    Plan / Patient Education:      Continue with initial plan of care.  Progress with home program as tolerated.    Plan for next visit: review HEP, lumbar DE and rotary torso, progress glute/hamstring stretching & strengthening, manual to low back, manual HF stretching, Ktape as needed, core strengthening and abdominal progressions    Subjective:     Pain Rating: Did not report  Some increase of soreness last Tuesday, unsure why. She is sore today, work has been going okay. She was feeling buttocks pain down the legs.     Objective:     Palpation: TTP at R>L PSIS, distal QL and lumbar paraspinals  Flexibility: decreased of hamstrings, HF, gastrocs, QL    Treatment Today     Exercises:  Exercise #1: supine knee rocks - 20 reps  Comment #1: supine or seated piriformis stretch - hold 30 sec  Exercise #2: supine QL lumbar rotation stretch - hold 30 sec  Comment #2: supine or standing HF stretch - hold 30 sec  Exercise #3: supine bridge with hip adduction - 10 reps  Comment #3: rotary torso - 90s, starting to L, 48#  Exercise #4: reformer leg press x 10 all springs, SL x 5 each side  Comment #4: reformer bridge x 10 all springs  Exercise #5: reformer lat pull down x 10, 2R  Comment #5: supine SLR - 10 reps  Exercise #6: clamshells - 10 reps - took out of HEP  Comment #6: standing hip abduction - 10 reps  Exercise #7: abdominal sets with progression  Comment #7: Seated hamstring stretch- 10-20 sec. hold  Exercise #8: IT band stretch standing and supine - hold 30 sec  Comment #8: hip abd/extension orange band - 10-20 reps        Enter Week / Visit #: wk 9  Weight (lbs): 139#  Reps (#): 10  ROM (degrees): 0-36  Pain: fatigued        TREATMENT MINUTES COMMENTS   Evaluation     Self-care/ Home management     Manual therapy 12 Supine: MFR to ITB,TFL, and bilateral hip flexors; TTP around the lateral L knee    Not Today: Prone MFR to R hamstring, ,calf, and hip adductors  Quad, Hip flexor, and hip internal/external rotator stretch manually   Neuromuscular  Re-education     Therapeutic Activity     Therapeutic Exercises 16 See above flowsheet  Nustep 3 minutes   Gait training     Modality__________________                Total 28    Blank areas are intentional and mean the treatment did not include these items.       Anabelle Up, PT  12/14/2020

## 2021-06-13 NOTE — PROGRESS NOTES
[facility] Rehabilitation Daily Progress     Patient Name: Alicia Smith  Date: 11/12/2020  Visit #: 8/16 CHRISTIANNE  Referral Diagnosis: low back pain  Referring provider: Rosa Bentley CNP  Visit Diagnosis:     ICD-10-CM    1. Chronic midline low back pain with bilateral sciatica  M54.41     M54.42     G89.29        Assessment:     Today patient returns for her 5th week of MedX. Patient is reporting with soreness due to new strengthening exercises. Temporarily removing clamshell exercise. She feels like she is about 15% better since beginning therapy. She is now feeling her symptoms more down the hamstrings rather than the glutes. Patient tolerated all exercises, addition to HEP, and manual therapy reporting decreased soreness at the end of today's session.    The patient would benefit from continuing to  receive skilled physical therapy services as they have responded appropriately to therapeutic exercises and also benefit their overall physical health.       From Eval:  Patient is a 34 y.o. female that presents with signs and symptoms consistent with R>L low back pain secondary to MRI lumbar spine shows mild degeneration at L4-5 and L5-S1. Patient demonstrates impairments including poor core stability, + SLUMP and SLR on R side, with TTP at R PSIS indicating slight SIJ involvement leading to impaired functional mobility. 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      HEP/POC compliance is  good .  Patient demonstrates understanding/independence with home program.  Patient is appropriate to continue with skilled physical therapy intervention, as indicated by initial plan of care.    Goal Status:  Pt. will be independent with home exercise program in : 6 weeks    Pt will: be able to roll over in bed 4-5x/night without increased low back symptoms or difficulty by 6 weeks.  Pt will:  be able to transfer from sit to  her car without increased low back symptoms or difficulty by 6 weeks    Plan / Patient Education:     Continue with initial plan of care.  Progress with home program as tolerated.    Plan for next visit: review HEP, lumbar DE and rotary torso, progress glute/hamstring stretching & strengthening, manual to low back, manual HF stretching, Ktape as needed, core strengthening and abdominal progressions    Subjective:     Pain Rating: Did not report    Patient has felt a sore since the last session. Was still able to complete HEP. Was able to incorporate new exercise into routine, thinks it may be due to new strengthening. She continues to have a mix of good and bad days. She now reports her symptoms are in her hamstring rather than the glutes. Notices it the most going from sit to stand.    Objective:     Palpation: TTP at R>L PSIS, distal QL and lumbar paraspinals  Flexibility: decreased of hamstrings, HF, gastrocs, QL    Treatment Today     Exercises:  Exercise #1: supine knee rocks - 20 reps  Comment #1: supine or seated piriformis stretch - hold 30 sec  Exercise #2: supine QL lumbar rotation stretch - hold 30 sec  Comment #2: supine or standing HF stretch - hold 30 sec  Exercise #3: supine bridge with hip adduction - 10 reps  Comment #3: rotary torso - 90s, starting to R, 40#  Exercise #4: reformer leg press x 10 all springs, SL x 5 each side  Comment #4: reformer bridge x 10 all springs  Exercise #5: reformer lat pull down x 10, 2R  Comment #5: supine SLR - 10 reps  Exercise #6: clamshells - 10 reps - took out of HEP  Comment #6: standing hip abduction - 10 reps  Exercise #7: abdominal sets with progression  Comment #7: Seated hamstring stretch- 10-20 sec. hold        Enter Week / Visit #: W4V2  Weight (lbs): 123#  Reps (#): 16  ROM (degrees): 0-36  Pain: no increase or decrease of pain        TREATMENT MINUTES COMMENTS   Evaluation     Self-care/ Home management     Manual  therapy 16 Prone MFR to R hamstring, ,calf, and hip adductors  Quad, Hip flexor, and hip internal/external rotator stretch manually   Neuromuscular Re-education     Therapeutic Activity     Therapeutic Exercises 14 See above flowsheet  Nustep 3 minutes   Gait training     Modality__________________                Total 30    Blank areas are intentional and mean the treatment did not include these items.     Goldy Arguelles, SPT   I attest that I was present in the room, making skilled assessments and directing the service provided today.  I was responsible for the assessment and treatment of the patient.  During this time, I was not engaged in treating another patient or doing other tasks.  Anabelle Up, PT  11/12/2020

## 2021-06-13 NOTE — PROGRESS NOTES
[facility] Rehabilitation Daily Progress     Patient Name: Alciia Smith  Date: 12/7/2020  Visit #: 12/16 CHRISTIANNE  Referral Diagnosis: low back pain  Referring provider: Rosa Bentley CNP  Visit Diagnosis:     ICD-10-CM    1. Chronic midline low back pain with bilateral sciatica  M54.41     M54.42     G89.29        Assessment:     Today patient returns for her 8th week of MedX. She reports that she has experienced some recent soreness without any increases in activity. However, the last week was a good week. She requested therabands progressions of exercises today and tolerated the progressed exercises well. Patient tolerated all exercises and manual therapy reporting decreased soreness at the end of today's session.     The patient would benefit from continuing to  receive skilled physical therapy services as they have responded appropriately to therapeutic exercises and also benefit their overall physical health.       From Eval:  Patient is a 34 y.o. female that presents with signs and symptoms consistent with R>L low back pain secondary to MRI lumbar spine shows mild degeneration at L4-5 and L5-S1. Patient demonstrates impairments including poor core stability, + SLUMP and SLR on R side, with TTP at R PSIS indicating slight SIJ involvement leading to impaired functional mobility. 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      HEP/POC compliance is  good .  Patient demonstrates understanding/independence with home program.  Patient is appropriate to continue with skilled physical therapy intervention, as indicated by initial plan of care.    Goal Status:  Pt. will be independent with home exercise program in : 6 weeks    Pt will: be able to roll over in bed 4-5x/night without increased low back symptoms or difficulty by 6 weeks.  Pt will: be able to transfer from sit to stand  in her car without increased low back symptoms or difficulty by 6 weeks    Plan / Patient Education:     Continue with initial plan of care.  Progress with home program as tolerated.    Plan for next visit: review HEP, lumbar DE and rotary torso, progress glute/hamstring stretching & strengthening, manual to low back, manual HF stretching, Ktape as needed, core strengthening and abdominal progressions    Subjective:     Pain Rating: Did not report    I'm sore today, not sure why. I woke up sore on the weekend as well. No increases or changes in activity so unsure why she's sore. Felt like last week was a good week.    Objective:     Palpation: TTP at R>L PSIS, distal QL and lumbar paraspinals  Flexibility: decreased of hamstrings, HF, gastrocs, QL    Treatment Today     Exercises:  Exercise #1: supine knee rocks - 20 reps  Comment #1: supine or seated piriformis stretch - hold 30 sec  Exercise #2: supine QL lumbar rotation stretch - hold 30 sec  Comment #2: supine or standing HF stretch - hold 30 sec  Exercise #3: supine bridge with hip adduction - 10 reps  Comment #3: rotary torso - 90s, starting to R, 48#  Exercise #4: reformer leg press x 10 all springs, SL x 5 each side  Comment #4: reformer bridge x 10 all springs  Exercise #5: reformer lat pull down x 10, 2R  Comment #5: supine SLR - 10 reps  Exercise #6: clamshells - 10 reps - took out of HEP  Comment #6: standing hip abduction - 10 reps  Exercise #7: abdominal sets with progression  Comment #7: Seated hamstring stretch- 10-20 sec. hold  Exercise #8: IT band stretch standing and supine - hold 30 sec  Comment #8: hip abd/extension orange band - 10-20 reps        Enter Week / Visit #: wk 8  Weight (lbs): 139#  Reps (#): 11  ROM (degrees): 0-36  Pain: fatigued        TREATMENT MINUTES COMMENTS   Evaluation     Self-care/ Home management     Manual therapy 12 Supine: MFR to ITB,TFL, and bilateral hip flexors; TTP around the lateral L knee    Not Today: Prone MFR  to R hamstring, ,calf, and hip adductors  Quad, Hip flexor, and hip internal/external rotator stretch manually   Neuromuscular Re-education     Therapeutic Activity     Therapeutic Exercises 16 See above flowsheet  Nustep 3 minutes   Gait training     Modality__________________                Total 28    Blank areas are intentional and mean the treatment did not include these items.     Goldy Arguelles, SPT   I attest that I was present in the room, making skilled assessments and directing the service provided today.  I was responsible for the assessment and treatment of the patient.  During this time, I was not engaged in treating another patient or doing other tasks.  Anabelle Up, PT  12/7/2020

## 2021-06-13 NOTE — PROGRESS NOTES
Assessment and Plan:     1. Jaw pain  Ambulatory referral to ENT     Differentials include dental infection, TMJ pain, parotitis, viral infection.  Discussed symptomatic treatment including resting the jaw through consuming liquids and chewing softer foods.  Patient will continue to take over-the-counter ibuprofen as needed.  Recommend sucking on hard candy to assist with any salivary gland obstruction.  I encouraged her to schedule a dental appointment.  Will refer to ENT as well for further evaluation.  She is content with the plan.  I encourage her to notify the clinic if a fever develops or symptoms worsen.    Subjective:     Alicia is a 34 y.o. female presenting to the clinic for concerns for left-sided jaw pain.  Patient states 4 weeks ago, she noticed some pain within her jaw with eating harder foods.  Over the past few days, she has had difficulty opening her mouth wide.  Patient has had intermittent swelling within her left cheek.  She has pain that radiates through the left side of her jaw into her neck.  She denies any teeth pain.  No fever has been present.  She denies any trauma to her face.  She has not had any recent cold symptoms.  She denies loss of sense of smell or taste.  She has been taking ibuprofen as needed with relief.  She denies chest pain, shortness of breath with exertion, edema.    Review of Systems: A complete 14 point review of systems was obtained and is negative or as stated in the history of present illness.    Social History     Socioeconomic History     Marital status: Single     Spouse name: Not on file     Number of children: Not on file     Years of education: Not on file     Highest education level: Not on file   Occupational History     Not on file   Social Needs     Financial resource strain: Not on file     Food insecurity     Worry: Not on file     Inability: Not on file     Transportation needs     Medical: Not on file     Non-medical: Not on file   Tobacco Use      "Smoking status: Never Smoker     Smokeless tobacco: Never Used   Substance and Sexual Activity     Alcohol use: No     Drug use: No     Sexual activity: Not on file   Lifestyle     Physical activity     Days per week: Not on file     Minutes per session: Not on file     Stress: Not on file   Relationships     Social connections     Talks on phone: Not on file     Gets together: Not on file     Attends Restorationist service: Not on file     Active member of club or organization: Not on file     Attends meetings of clubs or organizations: Not on file     Relationship status: Not on file     Intimate partner violence     Fear of current or ex partner: Not on file     Emotionally abused: Not on file     Physically abused: Not on file     Forced sexual activity: Not on file   Other Topics Concern     Not on file   Social History Narrative     Not on file       Active Ambulatory Problems     Diagnosis Date Noted     Abdominal Pain In The Central Upper Belly (Epigastric)      Serum Enzyme Levels - AST (SGOT) Elevated      Vitamin D deficiency 04/23/2018     Morbid obesity (H) 01/10/2020     Resolved Ambulatory Problems     Diagnosis Date Noted     Abdominal pain      Past Medical History:   Diagnosis Date     Ulcer        Family History   Problem Relation Age of Onset     Hypertension Unknown        Objective:     /78 (Patient Site: Left Arm, Patient Position: Sitting, Cuff Size: Adult Large)   Pulse 78   Temp 98.3  F (36.8  C)   Ht 5' 5\" (1.651 m)   Wt (!) 322 lb 8 oz (146.3 kg)   BMI 53.67 kg/m      Patient is alert, in no obvious distress.   Skin: Warm, dry.  No lesions or rashes.  Skin turgor rapid return.   HEENT:  Head normocephalic, atraumatic.  Eyes normal. Ears normal.  Nose patent, mucosa pink.  Oropharynx mucosa pink.  No lesions or tonsillar enlargement. No palpable mass is present in the cheek.  No salivary gland swelling is noted.  The cheek does not appear to be visibly swollen.  No palpable click " within the TMJ with opening and closing her jaw. No dental caries or abscess noted.   Neck: Supple, no lymphadenopathy.   Lungs:  Clear to auscultation. Respirations even and unlabored.  No wheezing or rales noted.   Heart:  Regular rate and rhythm.  No murmurs.

## 2021-06-14 NOTE — PROGRESS NOTES
[facility] Discharge Summary  Patient Name: Alicia Smith  Date: 3/11/2021  Referral Diagnosis: low back pain   Referring provider: Rosa Bentley CNP  Visit Diagnosis:   1. Chronic midline low back pain with bilateral sciatica         Patient was seen for 15 visits for physical therapy of low back pain from 10/19/2020 to 1/7/2021 with no follow up appointments.   The patient was instructed to follow up with physician's clinic.  The patient discontinued therapy, did not return.  No further therapy is required at this time.    Therapy will be discontinued at this time.  The patient will need a new referral to resume physical therapy treatment. Please see below for patient's current status.    Thank you for your referral.  Anabelle Up, PT, DPT  3/11/2021   11:31 AM      [facility] Daily Progress     Patient Name: Alicia Smith  Date: 1/7/2021  Visit #: 15/16 CHRISTIANNE  Referral Diagnosis: low back pain  Referring provider: Rosa Bentley CNP  Visit Diagnosis:     ICD-10-CM    1. Chronic midline low back pain with bilateral sciatica  M54.41     M54.42     G89.29        Assessment:     Today patient returns for her 11th week of MedX, hasn't been seen since 12/21, Patient having good and bad days, after vacation she came back and realized she has been sore in her legs bilaterally, feeling weakness with that too. Did not do medX today, due to increased soreness after last visit. Patient tolerated manual therapy today and will see referring provider on Monday.     The patient would benefit from continuing to  receive skilled physical therapy services as they have responded appropriately to therapeutic exercises and also benefit their overall physical health.       From Eval:  Patient is a 34 y.o. female that presents with signs and symptoms consistent with R>L low back pain secondary to MRI lumbar spine shows mild degeneration at L4-5 and L5-S1. Patient demonstrates impairments  including poor core stability, + SLUMP and SLR on R side, with TTP at R PSIS indicating slight SIJ involvement leading to impaired functional mobility. 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      HEP/POC compliance is  good .  Patient demonstrates understanding/independence with home program.  Patient is appropriate to continue with skilled physical therapy intervention, as indicated by initial plan of care.    Goal Status:  Pt. will be independent with home exercise program in : 6 weeks    Pt will: be able to roll over in bed 4-5x/night without increased low back symptoms or difficulty by 6 weeks.  Pt will: be able to transfer from sit to  her car without increased low back symptoms or difficulty by 6 weeks    Plan / Patient Education:     Continue with initial plan of care.  Progress with home program as tolerated.    Plan for next visit: review HEP, lumbar DE and rotary torso, progress glute/hamstring stretching & strengthening, manual to low back, manual HF stretching, Ktape as needed, core strengthening and abdominal progressions    Subjective:     Patient still having good days and bad days. Nothing really increased her symptoms. She did go golfing and there was an increase of symptoms but also it didn't hurt for her. She was feeling it in buttocks or down the leg, she is feeling upper thigh pain, she is feeling pain and weakness, she wasn't doing stairs at all during break, but when she got home she felt like it was a weakness.     Objective:     Palpation: TTP at R>L PSIS, distal QL and lumbar paraspinals  Flexibility: decreased of hamstrings, HF, gastrocs, QL    Treatment Today     Exercises:  Exercise #1: supine knee rocks - 20 reps  Comment #1: supine or seated piriformis stretch - hold 30 sec  Exercise #2: supine QL lumbar rotation stretch - hold 30 sec  Comment  #2: supine or standing HF stretch - hold 30 sec  Exercise #3: supine bridge with hip adduction - 10 reps  Comment #3: rotary torso - 90s, starting to R, 50#  Exercise #4: reformer leg press x 10 all springs, SL x 5 each side  Comment #4: reformer bridge x 10 all springs  Exercise #5: reformer lat pull down x 10, 2R  Comment #5: supine SLR - 10 reps  Exercise #6: clamshells - 10 reps - took out of HEP  Comment #6: standing hip abduction - 10 reps  Exercise #7: abdominal sets with progression  Comment #7: Seated hamstring stretch- 10-20 sec. hold  Exercise #8: IT band stretch standing and supine - hold 30 sec  Comment #8: hip abd/extension orange band - 10-20 reps        Enter Week / Visit #: wk 10  Weight (lbs): 141#  Reps (#): 10  ROM (degrees): 0-36  Pain: fatigued        TREATMENT MINUTES COMMENTS   Evaluation     Self-care/ Home management     Manual therapy 20 Supine: MFR to ITB,TFL, and bilateral hip flexors; TTP around the lateral L knee  SL on L: MFR to R hamstring, ,calf, and hip adductors  Quad, Hip flexor, and hip internal/external rotator stretch manually   Neuromuscular Re-education     Therapeutic Activity     Therapeutic Exercises 8 See above flowsheet  Nustep 3 minutes   Gait training     Modality__________________                Total 28    Blank areas are intentional and mean the treatment did not include these items.       Anabelle Up, PT  1/7/2021

## 2021-06-14 NOTE — PROGRESS NOTES
Assessment and Plan:     1. Right upper quadrant abdominal pain  We will obtain ultrasound to rule out cholelithiasis.  Other differentials include gastritis, GERD, pancreatitis, nephrolithiasis.  Will obtain hemogram, lipase, CMP, urinalysis.  Patient declines prescription pain medication today.  Discussed avoidance of fatty foods and alcohol.  If symptoms persist or worsen, patient states she will seek the ER evaluation.  She is content with the plan.  - US Abdomen Limited; Future  - HM2(CBC w/o Differential)  - Lipase  - Comprehensive Metabolic Panel  - Urinalysis-UC if Indicated        Subjective:     Alicia is a 31 y.o. female presenting to the clinic for concerns for right upper quadrant abdominal pain.  Patient was seen on 10/2/17 for concerns of abdominal bloating.  Hemogram, CMP, lipase, and H. pylori were normal.  She was prescribed omeprazole 20 mg daily.  Patient states her symptoms did subside.  Last week, she developed diarrhea.  This week she has been straining to have bowel movements but has been having bowel movements once daily.  Last night, she developed right upper quadrant abdominal pain.  Describes the pain as a constant sharp sensation.  She has had nausea without vomiting.  Her last bowel movement was last night and was normal.  She denies any blood or mucus in the stool.  She feels as though the pain in her abdomen is worsening.  She denies any alcohol consumption.  Her last menstrual period was on 10/31/17.  She feels as though the pain is radiating to her right thoracic region.    Review of Systems: A complete 14 point review of systems was obtained and is negative or as stated in the history of present illness.    Social History     Social History     Marital status: Single     Spouse name: N/A     Number of children: N/A     Years of education: N/A     Occupational History     Not on file.     Social History Main Topics     Smoking status: Never Smoker     Smokeless tobacco: Not on file  "    Alcohol use No     Drug use: No     Sexual activity: Not on file     Other Topics Concern     Not on file     Social History Narrative       Active Ambulatory Problems     Diagnosis Date Noted     Abdominal Pain In The Central Upper Belly (Epigastric)      Serum Enzyme Levels - AST (SGOT) Elevated      Abdominal Pain      Resolved Ambulatory Problems     Diagnosis Date Noted     No Resolved Ambulatory Problems     No Additional Past Medical History       Family History   Problem Relation Age of Onset     Hypertension         Objective:     /84  Pulse 80  Ht 5' 5\" (1.651 m)  Wt (!) 330 lb 1.6 oz (149.7 kg)  LMP 10/31/2017 (Exact Date)  BMI 54.93 kg/m2    Patient is alert, in no obvious distress. She is obese.   Skin: Warm, dry.    Lungs:  Clear to auscultation. Respirations even and unlabored.  No wheezing or rales noted.   Heart:  Regular rate and rhythm.  No murmurs, S3, S4, gallops, or rubs.    Abdomen: Soft, tenderness to palpation right upper abdomen.  No organomegaly. Bowel sounds normoactive. No guarding or masses noted.               "

## 2021-06-14 NOTE — PROGRESS NOTES
Assessment:   Alicia Smith is a 35 y.o. y.o. female with past medical history significant for morbid obesity, vitamin D deficiency, psoriasis who presents today for follow-up regarding chronic right buttock pain with intermittent radiation into the posterior thigh as well as bilateral groin pain and pain radiating down the anterior thighs with subjective weakness.    MRI lumbar spine shows transitional lumbosacral anatomy with partial sacralization of L5.  At L4-5 there is mild bilateral foraminal stenosis and mild bilateral facet arthropathy.  There is also mild degenerative disc disease L4-5 and L5-S1.  Patient does not have any L2 or L3 impingement to cause groin/anterior thigh pain.  I suspect that her groin and anterior thigh pain is actually related to hip pathology.  She had reproduction of pain with and internal and external rotation of both hips.  Suspect residual pain in the buttock is related to right sacroiliac joint dysfunction versus right radicular pain.  She had tenderness palpation over the right SI joint today.  She was neurologically intact.       Plan:     A shared decision making plan was used.  The patient's values and choices were respected.  The following represents what was discussed and decided upon by the physician assistant and the patient.      1.  DIAGNOSTIC TESTS: I reviewed the MRI lumbar spine.  No further diagnostic tests were ordered.  -If the hip joint injections provide significant but only short-term relief of her pain, would recommend obtaining an MRI of the hips for further evaluation.  -If sensation of leg weakness persists, would recommend EMG bilateral lower extremities.    2.  PHYSICAL THERAPY: Patient has completed 15 sessions of medics physical therapy.  She does her home exercises.  No further physical therapy was ordered.    3.  MEDICATIONS: No changes are made to the patient's medications.  Patient feels that her pain is under adequate control.  She takes Tylenol  1000 mg once to twice per day.  She also uses ibuprofen 600 mg once or twice per day.    4.  INTERVENTIONS:    -I offeredthe patient bilateral hip joint injections.  I am hoping this will be both therapeutic and diagnostic for her. The injection will use cortisone.  Cortisone weakens/suppresses the immune system so it does not function as well as it normally does.  We do not know if this could make it more likely that you could get the COVID-19 virus or if you do have the virus, if you are going to be sicker than you would have been if you hadn't had the cortisone injection.  Patient was willing to accept this risk and the order for the injection was placed.  -If right buttock pain persist, we could try a right sacroiliac joint injection.      5.  PATIENT EDUCATION: Patient is in agreement the above plan.  All questions were answered.    6.  FOLLOW-UP: Patient will have a 2-week post procedure follow-up visit with me following her bilateral hip joint injections.  Ideally this would be an in person visit.  If she has questions or concerns in the meantime, she should not hesitate to call.    Subjective:     Alicia Smith is a 35 y.o. female who presents today for follow-up regarding low back pain with radiation into the right greater than left lower extremity.  I last saw the patient September 24, 2020.  At that time I ordered an MRI lumbar spine which will be described low.  I referred the patient to physical therapy.  She has had 15 sessions of medics physical therapy.  Also prescribed nabumetone.  Patient reports slight improvement in her symptoms since she was last seen.  Patient notes that the pain in the low back and the nerve pain down the legs is significantly improved.  Unfortunately, she complains of ongoing pain in the bilateral groin that radiates down the anterior thighs.    Patient complains of only mild residual right buttock pain.  She has intermittent pain that radiates into the posterior thigh.   Pain does not radiate past the knee.  She also complains of pain in the bilateral groin.  Pain radiates down the anterior thigh to the knee.  This is a more severe area of pain now.  She rates her pain today as a 5-10.  At its worst it is a 9 out of 10.  At its best it is a 3 out of 10.  Pain is aggravated with sitting too long, getting up from sitting to standing.  She states that sometimes she feels like she waddles when she walks.  She also states that stairs aggravate the pain.  She states that her legs feel weak when she is ascending stairs.  Pain is alleviated with stretching.  She notes that her physical therapist has told her that her hip flexors feel very tight.  She denies any numbness or tingling.  She denies any loss of bowel or bladder control.    Patient has had 15 sessions of medics physical therapy.  She does her home exercises.  She is taking Tylenol 1000 mg 1-2 times per day.  She uses ibuprofen 600 mg once or twice per day.  Medications are somewhat helpful.    Past medical history is reviewed and is unchanged.    Review of Systems:  Positive for weakness, pain much worse at night.  Negative for numbness/tingling, loss of bowel/bladder control, inability to urinate, headache, trip/stumble/falls, difficulty swallowing, difficulty with hand skills, fevers, unintentional weight loss.     Objective:   CONSTITUTIONAL:  Vital signs as above.  No acute distress.  The patient is well nourished and well groomed.  Patient is obese.  PSYCHIATRIC:  The patient is awake, alert, oriented to person, place and time.  The patient is answering questions appropriately with clear speech.  Normal affect.  HEENT: Normocephalic, atraumatic.  Sclera clear.    SKIN:  Skin over the face, posterior torso, bilateral upper and lower extremities is clean, dry, intact without rashes.  VASCULAR: No significant lower extremity edema.  MUSCULOSKELETAL:  Gait is non-antalgic.  The patient is able to heel and toe walk without any  difficulty.  No significant tenderness over the bilateral lower lumbar paraspinal muscles.   Mild tenderness palpation right SI joint.   The patient has 5/5 strength for the bilateral hip flexors, knee flexors/extensors, ankle dorsiflexors/plantar flexors, ankle evertors/invertors.  Patient has full external rotation of both hips, although patient reports reproduction of groin pain at end right hip external rotation.  Patient has mild restriction with reproduction of groin pain bilaterally with internal rotation of the hips.  Patient also has reproduction of  groin pain with Gaenslen's test bilaterally.  Negative pelvic distraction test.  Negative thigh thrust bilaterally.  As mentioned above, Gaenslen's test causes groin pain, but no buttock pain.  NEUROLOGICAL: 2+ patellar, achilles reflexes which are symmetric bilaterally.  No ankle clonus bilaterally.  Sensation to light touch is intact in the bilateral L4, L5, and S1 dermatomes.       RESULTS: I reviewed the MRI lumbar spine from [facility] dated September 30, 2020.  This shows transitional lumbosacral anatomy with partial sacralization of L5.  There is mild degenerative disc disease L4-5 and L5-S1.  There is mild bilateral L4-5 foraminal stenosis.  There is mild bilateral L4-5 facet arthropathy.  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

## 2021-06-14 NOTE — PATIENT INSTRUCTIONS - HE
Bilateral hip joint injections has been ordered today.      Please note that this injection uses cortisone.  The cortisone may somewhat weaken the immune system.  It is unknown how much the immune system is weakened.  It is unknown if it is weakened to the point that you may be more likely to get the COVID-19 virus, or if you do get the COVID-19 virus, if you would be sicker than you would have been if you had not had the cortisone injection.  If you do not wish to proceed with the injection, please let the nurse/physician know and do NOT schedule the injection.    Please note that since your immune system is weakened from the cortisone, having a flu vaccine/shot may be less effective if you have this vaccine within 2 weeks from your cortisone injection.  It is advised to wait 2 weeks after your cortisone injection to have the flu shot (or if you have the flu shot first, wait 2 weeks before you have the cortisone injection).    Please schedule this injection at least 1 week  from now to allow time for insurance prior authorization.  On the day of your injection, you cannot be sick or taking antibiotics.  If you become sick and are prescribed, please call the clinic so your injection can be rescheduled for once you have completed your antibiotics.  You will need to bring a  with you for your injection.   If you have any questions or concerns prior to your injection, please do not hesitate to call the nurse navigation line at [phone] or contact Jocelyn Lagos through [facility].

## 2021-06-15 NOTE — PATIENT INSTRUCTIONS - HE
Please follow up two weeks post procedure with Jocelyn to evaluate your plan of care. This would ideally be an in office visit.       DISCHARGE INSTRUCTIONS    During office hours (8:00 a.m.- 4:00 p.m.) questions or concerns may be answered  by calling [facility] Navigation Nurses at  [phone].  Messages received after hours will be returned the following business day.      In the case of an emergency, please dial 911 or seek assistance at the nearest Emergency Room/Urgent Care facility.     All Patients:    ? You may experience an increase in your symptoms for the first 2 days (It may take anywhere between 2 days- 2 weeks for the steroid to have maximum effect).    ? You may use ice on the injection site, as frequently as 20 minutes each hour if needed.    ? You may take your pain medicine.    ? You may continue taking your regular medication after your injection. If you have had a Medial Branch Block you may resume pain medication once your pain diary is completed.    ? You may shower. No swimming, tub bath or hot tub for 48 hours.  You may remove your bandaid/bandage as soon as you are home.    ? You may resume light activities, as tolerated.    ? Resume your usual diet as tolerated.    ? It is strongly advised that you do not drive for 1-3 hours post injection.    ? If you have had oral sedation:  Do not drive for 8 hours post injection.      ? If you have had IV sedation:  Do not drive for 24 hours post injection.  Do not operate hazardous machinery or make important personal/business decisions for 24 hours.      POSSIBLE STEROID SIDE EFFECTS (If steroid/cortisone was used for your procedure)    -If you experience these symptoms, it should only last for a short period      Swelling of the legs                Skin redness (flushing)       Mouth (oral) irritation     Blood sugar (glucose) levels              Sweats                      Mood changes    Headache    Sleeplessness    Weakened immune system for up  to 14 days, which could increase the risk of alexi the COVID-19 virus and/or experiencing more severe symptoms of the disease, if exposed.    Decreased effectiveness of the flu vaccine if given within 2 weeks of the steroid.         POSSIBLE PROCEDURE SIDE EFFECTS  -Call the Spine Center if you are concerned    Increased Pain             Increased numbness/tingling        Nausea/Vomiting            Bruising/bleeding at site        Redness or swelling                                                Difficulty walking        Weakness             Fever greater than 100.5    *In the event of a severe headache after an epidural steroid injection that is relieved by lying down, please call the [facility] to speak with a clinical staff member*

## 2021-06-16 NOTE — PROGRESS NOTES
Assessment and Plan:     1. Polyarthralgia  Antinuclear Antibody (KOLTON) Cascade    Rheumatoid Factor Quant    Sedimentation Rate    Lyme Antibody Cascade    Vitamin D, Total (25-Hydroxy)    Celiac(Gluten)Antibody Panel ($$$)    Ambulatory referral to Rheumatology - [place]   2. Fatigue, unspecified type  HM2(CBC w/o Differential)    Basic Metabolic Panel    Thyroid [place]    Ambulatory referral to Rheumatology [facility]   3. Candidiasis of skin  nystatin (MYCOSTATIN) powder   4. Morbid obesity (H)     5. Psoriasis       Due to extensive polyarthralgia, will rule out autoimmune disease, vitamin D deficiency, and Lyme's.  Further plans pending the results.  Will refer to rheumatology as patient may be developing psoriatic arthritis.  She has known psoriasis on the scalp.  Patient also complains of underlying fatigue.  Will rule out anemia, diabetes, thyroid disease.  Will treat candidiasis around her umbilicus with nystatin powder.  Discussed using over-the-counter Lotrimin or Lamisil as needed.  She is to keep area clean and dry.  She is content with the plan.    Subjective:     Alicia is a 35 y.o. female presenting to the clinic for multiple concerns today.  Patient has been diagnosed with psoriasis in the past.  It is worse on her scalp.  Since September, she has been experiencing generalized joint pain which is worse in her shoulders and hips.  She has been seeing the spine clinic for lumbar pain and has completed 15 weeks of physical therapy.  She feels as though her pain has worsened.  She did receive cortisone injections in her hips on 3/10/2021 which helped her pain for a few days, but the pain has worsened.  She has been taking ibuprofen and Tylenol.  She does [place] summ[place]ime [place] cabin, but denies any known tick bites.  She complains of fatigue as she tends to work the night shift.  Patient has IBS.  Patient has a rash around her umbilicus.  Patient states it is pruritic and painful.  Rash has been  present for 3 to 4 months.  She has not tried any over-the-counter products for symptoms.    Review of Systems: A complete 14 point review of systems was obtained and is negative or as stated in the history of present illness.    Social History     Socioeconomic History     Marital status: Single     Spouse name: Not on file     Number of children: Not on file     Years of education: Not on file     Highest education level: Not on file   Occupational History     Not on file   Social Needs     Financial resource strain: Not on file     Food insecurity     Worry: Not on file     Inability: Not on file     Transportation needs     Medical: Not on file     Non-medical: Not on file   Tobacco Use     Smoking status: Never Smoker     Smokeless tobacco: Never Used   Substance and Sexual Activity     Alcohol use: No     Drug use: No     Sexual activity: Not on file   Lifestyle     Physical activity     Days per week: Not on file     Minutes per session: Not on file     Stress: Not on file   Relationships     Social connections     Talks on phone: Not on file     Gets together: Not on file     Attends Worship service: Not on file     Active member of club or organization: Not on file     Attends meetings of clubs or organizations: Not on file     Relationship status: Not on file     Intimate partner violence     Fear of current or ex partner: Not on file     Emotionally abused: Not on file     Physically abused: Not on file     Forced sexual activity: Not on file   Other Topics Concern     Not on file   Social History Narrative     Not on file       Active Ambulatory Problems     Diagnosis Date Noted     Abdominal Pain In The [place] (Epigastric)      Serum Enzyme Levels - AST (SGOT) Elevated      Vitamin D deficiency 04/23/2018     Morbid obesity (H) 01/10/2020     Psoriasis 04/21/2021     Resolved Ambulatory Problems     Diagnosis Date Noted     Abdominal pain      Past Medical History:   Diagnosis Date      Ulcer        Family History   Problem Relation Age of Onset     Hypertension Unknown        Objective:     /88 (Patient Site: Left Arm, Patient Position: Sitting, Cuff Size: Adult Large)   Pulse 99   Wt (!) 325 lb 3.2 oz (147.5 kg)   SpO2 99%   BMI 54.12 kg/m      Patient is alert, in no obvious distress.   Skin: Warm, dry.  She has a flat erythematous rash within her umbilicus and approximately 1/2 inch surrounding this.  Neck: Supple, no lymphadenopathy. No thyromegaly.  Lungs:  Clear to auscultation. Respirations even and unlabored.  No wheezing or rales noted.   Heart:  Regular rate and rhythm.  No murmurs.     Musculoskeletal:  Full ROM of extremities.

## 2021-06-17 NOTE — PROGRESS NOTES
Assessment and Plan:     1. Lumbar radiculopathy  She has a known disc herniation. Discussed symptomatic treatment including rest, ice, stretching activities.  Will treat with Medrol and Cyclobenzaprine.  Educated on its indications and side effects.  Patient is to avoid taking other sedatives with Cyclobenzparine.  Will refer to PT for further evaluation and treatment.  The patient is content with the plan.   - methylPREDNISolone (MEDROL DOSEPACK) 4 mg tablet; follow package directions  Dispense: 21 tablet; Refill: 0  - cyclobenzaprine (FLEXERIL) 5 MG tablet; Take 1-2 tablets (5-10 mg total) by mouth 3 (three) times a day as needed.  Dispense: 30 tablet; Refill: 0  - Ambulatory referral to PT/OT    Subjective:     Alicia is a 32 y.o. female presenting to the clinic for concerns for back pain.  Patient states last Thursday, she went to sit in a chair and developed right lower lumbar pain.  Patient states it is now radiating down her right buttock and lateral thigh to her right knee.  She describes the pain as a constant sharp sensation which is exacerbated with movement.  She has had difficulty driving.  Rest assists the pain.  She denies numbness and tingling of her extremities.  She has not had any urinary or fecal incontinence or retention.  She has been taking ibuprofen and leftover cyclobenzaprine as needed.  Patient was seen February 2016 for similar symptoms.  MRI of the lumbar spine showed a small right far lateral disc herniation at L4-5 with mass-effect upon the right L4 nerve.  She had 100% resolution of her pain with physical therapy.    Review of Systems: A complete 14 point review of systems was obtained and is negative or as stated in the history of present illness.    Social History     Social History     Marital status: Single     Spouse name: N/A     Number of children: N/A     Years of education: N/A     Occupational History     Not on file.     Social History Main Topics     Smoking status: Never  "Smoker     Smokeless tobacco: Never Used     Alcohol use No     Drug use: No     Sexual activity: Not on file     Other Topics Concern     Not on file     Social History Narrative       Active Ambulatory Problems     Diagnosis Date Noted     Abdominal Pain In The [place] (Epigastric)      Serum Enzyme Levels - AST (SGOT) Elevated      Abdominal Pain      Resolved Ambulatory Problems     Diagnosis Date Noted     No Resolved Ambulatory Problems     Past Medical History:   Diagnosis Date     Ulcer        Family History   Problem Relation Age of Onset     Hypertension         Objective:     /80  Pulse 76  Ht 5' 5\" (1.651 m)  Wt (!) 334 lb 12.8 oz (151.9 kg)  BMI 55.71 kg/m2    Patient is alert, in no obvious distress. She is obese.   Skin: Warm, dry.    Lungs:  Clear to auscultation. Respirations even and unlabored.  No wheezing or rales noted.   Heart:  Regular rate and rhythm.  No murmurs.   Musculoskeletal: She has full range of motion bending at waist.  She is able to heel and toe walk without difficulty.  Straight leg raise is negative bilaterally.  Patellar and Achilles reflexes +2.  She is tender to palpation of her right lower lumbar paraspinous muscles and right sciatic region.              "

## 2021-06-17 NOTE — TELEPHONE ENCOUNTER
In Basket message received for Hematology Consult, referring physician is Rosa Bentley CNP. Call placed to Alicia to set up appointment. Same scheduled for Tuesday, May 25, 2021 at 10:00 am with . With a nurse apt at  9:45 am. E mail sent with informational letter, [facility] intake form, medication and allergy list, and appointment letter. Work up for Leukocytosis has been done at [facility].   Medical records will collect any outside records.      I explained that the patient would becoming to a cancer center and that the doctors are both cancer and blood doctors. Explained the appointment process of arriving early and bringing Health History and medication allergy list along to appointment.     Given [facility] Number to call if there are further questions or concerns. [phone]

## 2021-06-17 NOTE — CONSULTS
[facility] Consult Note    Patient: Alicia Smith  MRN: [id number]  Date of Service: 05/25/2021        Reason for Visit    I was consulted by Rosa Bentley NP regarding an elevated white blood cell count and platelet count.    Assessment/Plan    Ms. Alicia Smith is a 35 y.o. woman who was referred to hematology for an evaluated white blood cell count and platelet count.  I reviewed her previous medical records.  I have also reviewed her outside medical records through [facility].    Today I reviewed with her the labs that she has had previously.  The BMP that she had done on 4/21/2021 showed a creatinine of 0.68 with normal electrolytes.  TSH was normal.  Celiac antibody panel was negative.  Lyme antibody cascade was negative.  The KOLTON cascade it did not report anything positive.  Rheumatoid factor came back negative.  ESR was elevated at 47 mm/h.  ESR and CRP were again elevated on 1/10/2020.  Vitamin B12 level was normal on 4/20/2018.    Reviewed with her blood counts.  On 4/29/2021 she had a white count of 13.1 which is above normal of 11.  Hemoglobin is normal at 12.6.  Platelet count was at the upper end of normal at 202213.  MCV was normal.  RDW was normal.  The differential count showed that the elevated white blood cell count was because of neutrophilia with a neutrophil percentage of 77% and an ANC of 10,000.  Rest of the differential was normal.  We can see that this pattern was persistent over the last 2 years.  On 11/24/2017 she had a white blood cell count of 12.4 with a normal hemoglobin and platelets.  Prior to that the blood counts that we have in our system do not show a leukocytosis.  CBC and platelets that she had done on 8/30/2011 from the [facility] system showed a WBC count of 10.7 which is within normal limits.    She had a peripheral blood smear done on 4/29/2021 and I reviewed the pathologist report which showed that the RBCs appeared normal.  Again noted was  a leukocytosis due to neutrophilia.  The platelets were noted to be high normal in number but overall normal in appearance.  The pathologist recommended checking mutations for an underlying marrow disorder.    I then reviewed with her the images and the report of the CT scan of the chest that she had done on 1/8/2020.  The liver looked normal and the spleen looked normal.  We did not see any enlarged lymph nodes either.  She had a CT scan of the abdomen and pelvis done on 12/8/2017 which again did not show any abnormal lymph nodes or hepatosplenomegaly.    I discussed with her that I suspect that the elevated white blood cell count is due to neutrophils and the mildly elevated platelet count is most likely reactive to some sort of inflammatory condition going on.  It is quite possible that this is connected to the joint pains that she is having.  I wonder whether she has a seronegative arthritis like psoriatic arthritis underlying.  We will know when she has the rheumatology work-up.    Meanwhile we will go for a work-up to make sure that she does not have an underlying myeloproliferative disorder that is responsible for the neutrophilia and thrombocytosis.  She was agreeable to doing a systematic work-up.  If an underlying hematological disorder is ruled out, then she need not worry about that again.    Today we will obtain the following labs: Ferritin, TIBC panel, BCR/ABL qualitative PCR, JAK2 mutation with reflex to MPL and PERNELL R mutations.    I discussed with her that once I have the results of the gene tests available, I will give her a call.  I asked her to expect a call in about 2 weeks time.     Time spend >45 minutes total time for the patient.     Addendum. 6/4/2021:    I called Ms. Smith on the phone to discuss the lab results.  The BCR ABL PCR has come back negative.  JAK2, PERNELL R and MPL mutations also come back negative.  Thus I do not think there is any myeloproliferative disorder or other underlying  hematological disorder going on.  I think that the mild leukocytosis is merely reactive.    When we look at her iron panel and ferritin, even though the ferritin is slightly elevated at 133, the iron saturation and serum iron levels are both low.  I think she is mildly iron deficient.  This may be playing a part in why her white count is slightly elevated.  Advised her to take an iron tablet daily for supplementation.  She voiced understanding.    No need for further hematological work-up.  Continue follow-up with her primary doctor.      ECOG Performance   ECOG Performance Status: 0  Distress Assessment  Distress Assessment Score: No distress        Problem List    1. Thrombocytosis (H)  Ferritin    Iron and Transferrin Iron Binding Capacity    JAK2 with Reflex to MPL, CALR, MPN Focused NGS - Misc. Lab Test    BCR/ABL qualitative PCR. - Misc. Lab Test   2. Neutrophilia  Ferritin    JAK2 with Reflex to MPL, CALR, MPN Focused NGS - Misc. Lab Test    BCR/ABL qualitative PCR. - Misc. 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  He is here to discuss about an elevated white blood cell count and a borderline elevated platelet count that appeared over the last couple of years.  The question is whether this is got any relationship to or her symptoms.  Whether she has any underlying hematological issue.    Her main concerns are about multiple joint aches and pains.  This involves her hips, shoulders, small joints of her hands and her feet.  She has longstanding issues with back pain and sciatica.  She has been diagnosed in the past with lumbar radiculopathy and has had a steroid injections without much change in the chronic pain.  She also has psoriasis.  Mostly the lesions are in her scalp.  She has a few lesions on her body also.  She is wondering whether she has developed psoriatic  arthritis.  She has an appointment with rheumatology in about 2 weeks time.    She also reports a bit of numbness in her left hand fingers that comes and goes.  No persistent numbness or tingling anywhere.    She does have some chronic fatigue.  However she continues to work full-time.  Occasionally has some dizziness.  Sometimes she has some tinnitus.  Sometimes she notices some swelling in her hands and feet but they are not present now.    She has a history of irritable bowel syndrome and because of that occasional loose stools and occasional constipation but nothing very much out of the ordinary.  She has never noticed any blood in stool or black stools.    Fevers or night sweats.  No lumps or bumps anywhere.  No unusual headaches.  No vision changes.  No mouth sores.  No swallowing difficulty.  No nausea or vomiting.  She is not losing weight.  Breathing is fine.  No chest pain or cough.  No sharp abdominal pain.  Menstrual cycles are normal.  Urination is fine.  No skin rashes.  No blood in stool or black stools.  Please see below.  A 14 point review of system is otherwise completely negative.    Past History  Past Medical History:   Diagnosis Date     Acute pancreatitis 2016     GERD (gastroesophageal reflux disease)      Infective otitis externa, right 08/29/2018     Irritable bowel syndrome      Lumbar radiculopathy      Psoriasis      Vitamin D deficiency      Past Surgical History:   Procedure Laterality Date     LASIK Bilateral 2016     Family History   Problem Relation Age of Onset     Hypertension Other      Hyperlipidemia Mother      Diabetes type II Mother      Obesity Father      Hypertension Father      Diabetes type II Brother      Obesity Brother      Social History     Socioeconomic History     Marital status: Single     Spouse name: None     Number of children: None     Years of education: None     Highest education level: None   Occupational History     Occupation: Nurse.   Social Needs      Financial resource strain: None     Food insecurity     Worry: None     Inability: None     Transportation needs     Medical: None     Non-medical: None   Tobacco Use     Smoking status: Never Smoker     Smokeless tobacco: Never Used   Substance and Sexual Activity     Alcohol use: Not Currently     Alcohol/week: 0.0 - 1.0 standard drinks     Drug use: No     Sexual activity: None   Lifestyle     Physical activity     Days per week: None     Minutes per session: None     Stress: None   Relationships     Social connections     Talks on phone: None     Gets together: None     Attends Tenriism service: None     Active member of club or organization: None     Attends meetings of clubs or organizations: None     Relationship status: None     Intimate partner violence     Fear of current or ex partner: None     Emotionally abused: None     Physically abused: None     Forced sexual activity: None   Other Topics Concern     None   Social History Narrative     None     Allergies    No Known Allergies    Review of Systems    General  General (WDL): Exceptions to WDL  Fatigue: Yes - Chronic (Greater than 3 months)  Generalized Muscle Weakness: Yes - Chronic (Greater than 3 months)  ENT  ENT (WDL): Exceptions to WDL  Tinnitus: Yes - Chronic (Greater than 3 months)  Respiratory  Respiratory (WDL): All respiratory elements are within defined limits  Cardiovascular  Cardiovascular (WDL): Exceptions to WDL  Edema Limbs: Yes - Recent (Less than 3 months)  Endocrine  Endocrine (WDL): All endocrine elements are within defined limits  Gastrointestinal  Gastrointestinal (WDL): Exceptions to WDL  Constipation: Yes - Chronic (Greater than 3 months)  Diarrhea: Yes - Chronic (Greater than 3 months)  Hemorrhoids: Yes - Chronic (Greater than 3 months)  Musculoskeletal  Musculoskeletal (WDL): Exceptions to WDL  Range of Motion Limitation: Yes - Chronic (Greater than 3 months)  Joint pain: Yes - Chronic (Greater than 3 months)  Back Pain: Yes  "- Chronic (Greater than 3 months)  Pain interfering with walking: Yes - Chronic (Greater than 3 months)  Muscle pain or stiffness: Yes - Chronic (Greater than 3 months)  Neurological  Neurological (WDL): Exceptions to WDL  Dominant Hand: Right  Numbness and/or tingling: Yes - Recent (Less than 3 months)(left hand)  Psychological/Emotional  Psychological/Emotional (WDL): Exceptions to WDL  Daytime sleepiness: Yes - Chronic (Greater than 3 months)  Hematological/Lymphatic  Hematological/Lymphatic (WDL): All hematological/lymphatic elements are within defined limits  Dermatological  Dermatologic (WDL): Exceptions to WDL(\"psoriasis patches\")  Genitourinary/Reproductive  Genitourinary/Reproductive (WDL): All genitourinary/reproductive elements are within defined limits  Reproductive (Females only)  Menstrual irritation or increase in discharge: No  Age at start of periods: 11  Last menstural cycle: 05/06/2021  Number of pregnancies: 0  Patient Pregnant?: No  Is the patient trying to get pregnant?: No  Pain  Currently in Pain: Yes  Pain Frequency: Constant/continuous  Location: generalized joints  Pain Characteristics : Sharp;Sore  Pain Intervention(s): Home medication  Response to Interventions: tylenol, NSAIDs helpful reduces to 3    Physical Exam    Recent Vitals 5/25/2021   Height 5' 5\"   Weight 333 lbs   BSA (m2) 2.63 m2   /87   Pulse 73   Temp 98.2   Temp src 1   SpO2 98   Some recent data might be hidden       GENERAL: Alert and oriented to time place and person. Seated comfortably. In no distress.  Heavily built.  Very pleasant.    HEAD: Atraumatic and normocephalic.    EYES: COSTA, EOMI.  No pallor.  No icterus.    Oral cavity: no mucosal lesion or tonsillar enlargement.    NECK: supple. JVP normal.  No thyroid enlargement.    LYMPH NODES: No palpable, cervical, axillary or inguinal lymphadenopathy.    CHEST: clear to auscultation bilaterally.  Resonant to percussion throughout bilaterally.  Symmetrical " breath movements bilaterally.    CVS: S1 and S2 are heard. Regular rate and rhythm.  No murmur or gallop or rub heard.  No peripheral edema.    ABDOMEN: Soft. Not tender. Not distended.  Some abdominal obesity.  No palpable hepatomegaly or splenomegaly.  No other mass palpable.  Bowel sounds heard.    EXTREMITIES: Warm.    SKIN: no rash, or bruising or purpura.  Has a full head of hair.      Signed by: Harika Szymanski MD

## 2021-06-17 NOTE — PATIENT INSTRUCTIONS - HE
Patient Instructions by Rosa Bentley CNP at 1/10/2020  8:10 AM     Author: Rosa Bentley CNP Service: -- Author Type: Nurse Practitioner    Filed: 1/10/2020  8:12 AM Encounter Date: 1/10/2020 Status: Signed    : Rosa Bentley CNP (Nurse Practitioner)       Patient Education     Costochondritis    Costochondritis is inflammation of a rib or the cartilage that connects a rib to your breastbone (sternum). It causes tenderness, and sometimes chest pain may be sharp or aching, or it may feel like pressure. Pain may get worse with deep breathing, movement, or exercise. In some cases, the pain is mistaken for a heart attack. Despite this, the condition is not serious. Read on to learn more about the condition and how it can be treated.  What causes costochondritis?  The cause of costochondritis is not completely clear, but it may happen after a chest injury, chest infection, or coughing episode. Some physical activities can sometimes lead to costochondritis. Large-breasted women may be more likely to have the condition. Often, the reason for the inflammation is unknown.  Diagnosing costochondritis  There is no test for costochondritis. The condition is diagnosed by the symptoms you have. Your healthcare provider will perform a physical exam. He or she will ask you about your symptoms and examine your chest for tenderness. In some cases, tests are done to rule out more serious problems. These tests may include imaging tests such as chest X-ray, CT scan, or an ECG.  Treating costochondritis  If an underlying cause is found, treatment for that will likely relieve the problem. Costochondritis often goes away on its own. The course of the condition varies from person to person. It usually lasts from weeks to months. In some cases, mild symptoms continue for months to years. To ease symptoms:    Take medicine as directed. These relieve pain and swelling. Ibuprofen or other NSAIDs are often recommended. In some  cases, you may be given prescription medicine, such as muscle relaxants.    Avoid activities that put stress on the chest or spine.    Apply a heating pad (set to warm, not too high, heat) to the breastbone several times a day.    Perform stretching exercises as directed.  Call the healthcare provider right away if you have any of the following:    Pain that is not relieved by medicine    Shortness of breath    Lightheadedness, dizziness, or fainting    Feeling of irregular heartbeat or fast pulse  Anyone with chest pain should see a healthcare provider, especially those who are older and may be at risk for heart disease.   Date Last Reviewed: 10/1/2016    6088-9116 [employer]. [address] [place] [address], [place] [address]. All rights reserved. This information is not intended as a substitute for professional medical care. Always follow your healthcare professional's instructions.

## 2021-06-19 NOTE — LETTER
Letter by Rosa Bentley CNP at      Author: Rosa Bentley CNP Service: -- Author Type: --    Filed:  Encounter Date: 9/20/2019 Status: (Other)         Alicia Smith  [id number]  [place] [id number]      September 20, 2019      Dear Alicia    In reviewing your records, we have determined a gap in your preventive services. Based on your age and health history, we recommend the follow service.     ? Physical with a Pap Smear      If you have had the service elsewhere, please contact us so we can update our records. Please let us know if you have transferred your care to another clinic.    Please call [phone] to schedule this appointment.    We believe that a strong preventive care program, including regular physicals and follow-up care is an important part of a healthy lifestyle and we are committed to helping you maintain your health.    Thank you for choosing us as your health care provider.    Sincerely,     [facility]

## 2021-06-21 NOTE — LETTER
Letter by Harika Szymanski MD at      Author: Harika Szymanski MD Service: -- Author Type: --    Filed:  Encounter Date: 5/11/2021 Status: (Other)       Dear Alicia Smith    Thank you for choosing [facility] for your care.  We are committed to providing you with the highest quality and compassionate healthcare services.  The following information pertains to your first appointment with our clinic.    Date/Time of appointment:  Tuesday, May 25, 2021 at 9:45 am.       Note: Please arrive 15 minutes prior to your appointment time.  This allows time to complete forms and the nursing assessment.    Name of your Physician:  Harika Szymanski MD    What to bring to your appointment:    Completed Patient History/Initial Nursing Assessment and Medication/Allergy List (these forms were sent to you).    Any paperwork or films from your physician that we have asked you to bring.    Your current insurance card(s).    Parking:    Please refer to the map included below to direct you to [facility]    The [facility] parking lot is west of the main hospital entrance. This is free parking and is located right next to the [facility] entrance.    Come in the [facility] entrance and check in at the .    We hope these instructions are helpful to you.  If you have any questions or concerns, please call us at [phone].  It is our pleasure to assist you.    Warm Regards,    Tana De Jesus    [phone]

## 2021-06-23 NOTE — PROGRESS NOTES
Assessment and Plan:     1. Acute suppurative otitis media of left ear without spontaneous rupture of tympanic membrane, recurrence not specified  Will treat with Augmentin.  Educated on its indications and side effects. Discussed symptomatic treatment with OTC Tylenol or Ibuprofen for discomfort.  If symptoms persist, suggest follow-up.   - amoxicillin-clavulanate (AUGMENTIN) 875-125 mg per tablet; Take 1 tablet by mouth 2 (two) times a day for 10 days.  Dispense: 20 tablet; Refill: 0    2. Rash  Differentials include tinea, eczema, psoriasis, seborrheic dermatitis.  Will treat with Ketoconazole cream.  If symptoms persist, may consider steroid solution or referral to dermatology.  She is content with the plan.   - ketoconazole (NIZORAL) 2 % shampoo; Apply to damp skin, lather, leave on 5 minutes, and rinse  Dispense: 120 mL; Refill: 0    Subjective:     Alicia is a 33 y.o. female presenting to the clinic for concerns for ongoing left ear pain.  Patient has been experiencing left ear discomfort intermittently for 3 weeks.  She has had an occasional throbbing sensation.  She feels as though there is a bubble in her ear.  Last night, the pain was radiating down the left side of her neck.  She has had difficulty hearing.  She has been using over-the-counter Debrox drops for possible cerumen impaction.  She has been taking ibuprofen as needed.  She denies fever.  She has not had any recent cold symptoms including rhinorrhea, postnasal drainage, cough, headache, stomachache, nausea, vomiting.  She also complains of a rash present behind her right ear for 6 months.  Patient states it is pruritic.  She is tried head and shoulders with no relief.  It has increased in size over the past month.    Review of Systems: A complete 14 point review of systems was obtained and is negative or as stated in the history of present illness.    Social History     Socioeconomic History     Marital status: Single     Spouse name: Not on  "file     Number of children: Not on file     Years of education: Not on file     Highest education level: Not on file   Social Needs     Financial resource strain: Not on file     Food insecurity - worry: Not on file     Food insecurity - inability: Not on file     Transportation needs - medical: Not on file     Transportation needs - non-medical: Not on file   Occupational History     Not on file   Tobacco Use     Smoking status: Never Smoker     Smokeless tobacco: Never Used   Substance and Sexual Activity     Alcohol use: No     Drug use: No     Sexual activity: Not on file   Other Topics Concern     Not on file   Social History Narrative     Not on file       Active Ambulatory Problems     Diagnosis Date Noted     Abdominal Pain In The [place] (Epigastric)      Serum Enzyme Levels - AST (SGOT) Elevated      Abdominal Pain      Vitamin D deficiency 04/23/2018     Resolved Ambulatory Problems     Diagnosis Date Noted     No Resolved Ambulatory Problems     Past Medical History:   Diagnosis Date     Ulcer        Family History   Problem Relation Age of Onset     Hypertension Unknown        Objective:     /80   Pulse 76   Ht 5' 5\" (1.651 m)   Wt (!) 329 lb (149.2 kg)   SpO2 98%   BMI 54.75 kg/m      Patient is alert, in no obvious distress.   Skin: Warm, dry.  She has a dime size circular rash behind her right ear which is dry and scaly with a defined border.   HEENT:  Head normocephalic, atraumatic.  Eyes normal. Right ear is normal.  Left ear has purulent drainage in the canal.  I removed a small amount with a curet.  After ear lavage, TM appears erythematous and mild drainage remains.   Nose patent, mucosa pink.  Oropharynx mucosa pink.  No lesions or tonsillar enlargement.   Neck: Supple, no lymphadenopathy.  Lungs:  Clear to auscultation. Respirations even and unlabored.  No wheezing or rales noted.   Heart:  Regular rate and rhythm.  No murmurs.                "

## 2021-06-25 NOTE — PROGRESS NOTES
Progress Notes by Simon Ya DO at 2/10/2017  7:40 PM     Author: Simon Ya DO Service: -- Author Type: Physician    Filed: 2/11/2017  8:34 PM Encounter Date: 2/10/2017 Status: Signed    : Simon Ya DO (Physician)       Chief Complaint   Patient presents with   ? Cough     sore throat, body aches, nasal drainage, facial pain. Started 4 days ago   ? Fever     100.5, started today         History of Present Illness: Nursing notes reviewed. Patient has a pressure like sensation in left ear starting today. She has some shortness of breath while at rest during exam. She has frontal and maxillary sinus discomort. She has had some cold symptoms for about the past four days.    Review of systems: See history of present illness, otherwise negative.     Current Outpatient Prescriptions   Medication Sig Dispense Refill   ? PHENYLEPHRINE/DM/ACETAMINOP/GG (TYLENOL COLD HEAD CONGESTION ORAL) Take by mouth.     ? pseudoephedrine (SUDAFED) 120 mg 12 hr tablet Take 120 mg by mouth every 12 (twelve) hours.     ? albuterol (PROVENTIL HFA;VENTOLIN HFA) 90 mcg/actuation inhaler Inhale 2 puffs every 4 (four) hours as needed for wheezing or shortness of breath. 1 Inhaler 0   ? azithromycin (ZITHROMAX Z-KARTHIK) 250 MG tablet Take 2 tablets (500 mg) on  Day 1,  followed by 1 tablet (250 mg) once daily on Days 2 through 5. 6 tablet 0     No current facility-administered medications for this visit.        No past medical history on file.   Past Surgical History:   Procedure Laterality Date   ? none        Social History     Social History   ? Marital status: Single     Spouse name: N/A   ? Number of children: N/A   ? Years of education: N/A     Social History Main Topics   ? Smoking status: Never Smoker   ? Smokeless tobacco: None   ? Alcohol use No   ? Drug use: No   ? Sexual activity: Not Asked     Other Topics Concern   ? None     Social History Narrative       History   Smoking Status   ? Never Smoker   Smokeless  Tobacco   ? Not on file      Exam:   Blood pressure 118/70, pulse (!) 114, temperature 99.1  F (37.3  C), temperature source Oral, resp. rate 15, weight (!) 318 lb (144.2 kg), last menstrual period 02/09/2017, SpO2 99 %.    EXAM:   General: Vital signs reviewed. Patient is in no acute appearing distress on initial exam due to shortness of breath. This was improved after a nebulizer treatment with albuterol in the clinic.Patient is alert and oriented x 3.   Tympanic membrane of left ear is dull and injected, with other tympanic membrane being clear without injection. Increased turbinate injection, edema, and Increased rhinorrhea noted. No pharyngeal injection or exudate noted, though patient does have some throat discomfort.  Neck: supple with increased adenopathy  Heart: Normal rate and rhythm without murmur  Lungs: Clear to auscultation with fair air flow bilaterally on initial exam. After the neb treatment, the breath sounds were search auscultation of a good airflow bilaterally.  Skin: warm and dry    Assessment/Plan   1. Throat pain  Influenza A/B Rapid Test    Rapid Strep A Screen-Throat    Group A Strep, RNA Direct Detection, Throat   2. SOB (shortness of breath)  albuterol nebulizer solution 3 mL (PROVENTIL)    Influenza A/B Rapid Test    XR Chest PA and Lateral    albuterol (PROVENTIL HFA;VENTOLIN HFA) 90 mcg/actuation inhaler   3. Tachycardia  Influenza A/B Rapid Test   4. Cough  XR Chest PA and Lateral    albuterol (PROVENTIL HFA;VENTOLIN HFA) 90 mcg/actuation inhaler   5. Influenza B     6. Left otitis media  azithromycin (ZITHROMAX Z-KARTHIK) 250 MG tablet   7. Sinusitis  azithromycin (ZITHROMAX Z-KARTHIK) 250 MG tablet       Patient Instructions     Also see info below. Be seen again in 3 days if symptoms are not better, sooner if feeling any worse.  Causes of Sinusitis           Mucus helps keep your sinuses clean. But mucus may build up in the sinuses due to colds, allergies, or obstructions. These things  interfere with the natural drainage of mucus. This may lead to sinusitis (sinus inflammation and infection).    Acute sinusitis comes on suddenly. It often happens right after an upper respiratory infection, such as a cold.    Chronic sinusitis is ongoing swelling of the sinus lining. This is often the result of allergies or chronic infections.  Colds and other infections  A cold or flu may cause your sinus and nasal linings to swell. Sinus openings can become blocked. This causes mucus to back up. This backed-up mucus becomes an ideal place for bacteria to grow. Thick, yellow, or discolored mucus is one sign of infection.  Allergic reactions  You may be sensitive to certain substances. This causes the release of histamine in the body. Histamine makes your sinus and nasal linings swell. Long-term swelling clogs your sinuses. It prevents the cilia (tiny hairs in the nasal lining) from sweeping away mucus. Allergy symptoms can be persistent. But theyre less severe than with colds.  Obstructions    A polyp is a sac of swollen tissue. It can be the result of an allergy or infection. It may block the middle meatus (the opening where most of your sinuses drain). It may even grow large enough to block your nose.    A deviated septum is when the thin wall inside your nose is pushed to one side. It is often the result of injury. This can block your middle meatus.    2048-8252 [employer]. [address] [place] [address]. All rights reserved. This information is not intended as a substitute for professional medical care. Always follow your healthcare professional's instructions.          Influenza  Influenza (the flu) is an infection that affects your respiratory tract (the mouth, nose, and lungs, and the passages between them). Unlike a cold, the flu can make you very ill. And it can lead to pneumonia, a serious lung infection. For some people, especially older adults, young children, and people with  certain chronic conditions, the flu can have serious complications and even be fatal.  What Are the Risk Factors for the Flu?  Anyone can get the flu. But youre more likely to become infected if you:    Have a weakened immune system.    Work in a health care setting where you may be exposed to flu germs.    Live or work with someone who has the flu.    Havent received an annual flu shot.  How Does the Flu Spread?  The flu is caused by viruses. The viruses spread through the air in droplets when someone who has the flu coughs, sneezes, laughs, or talks. You can become infected when you inhale these viruses directly. You can also become infected when you touch a surface on which the droplets have landed and then transfer the germs to your eyes, nose, or mouth. Touching used tissues, or sharing utensils, drinking glasses, or a toothbrush with an infected person can expose you to flu viruses, too.  What Are the Symptoms of the Flu?  Flu symptoms tend to come on quickly and may last a few days to a few weeks. They include:    Fever usually higher than 101 F  (38.3 C) and chills    Sore throat and headache    Dry cough    Runny nose    Tiredness and weakness    Muscle aches  Factors That Can Make Flu Worse  For some people, the flu can be very serious. The risk of complications is greater for:    Children under age 5.    Adults 65 years of age and older.    People with a chronic illness, such as diabetes or heart, kidney, or lung disease.    People who live in a nursing home or long-term care facility.   How Is the Flu Treated?  Influenza usually improves after 7 days or so. In some cases, your health care provider may prescribe an antiviral medication. This may help you get well sooner. For the medication to help, you need to take it as soon as possible (ideally within 48 hours) after your symptoms start. If you develop pneumonia or other serious illness, hospital care may be needed.  Easing Flu Symptoms    Drink lots of  fluids such as water, juice, and warm soup. A good rule is to drink enough so that you urinate your normal amount.    Get plenty of rest.    Ask your health care provider what to take for fever and pain.    Call your provider if your fever rises over 101 F (38.3 C) or you become dizzy, lightheaded, or short of breath.  Taking Steps to Protect Others    Wash your hands often, especially after coughing or sneezing. Or, clean your hands with an alcohol-based hand  containing at least 60 percent alcohol.    Cough or sneeze into a tissue. Then throw the tissue away and wash your hands. If you dont have a tissue, cough and sneeze into the crook of your elbow.    Stay home until at least 24 hours after you no longer have a fever or chills. Be sure the fever isnt being hidden by fever-reducing medication.    Dont share food, utensils, drinking glasses, or a toothbrush with others.    Ask your health care provider if others in your household should receive antiviral medication to help them avoid infection.  How Can the Flu Be Prevented?    One of the best ways to avoid the flu is to get a flu vaccination each year. Viruses that cause the flu change from year to year. For that reason, doctors recommend getting the flu vaccine each year, as soon as it's available in your area. The vaccine may be given as a shot or as a nasal spray. Your health care provider can tell you which vaccine is right for you.    Wash your hands often. Frequent handwashing is a proven way to help prevent infection.    Carry an alcohol-based hand gel containing at least 60 percent alcohol. Use it when you dont have access to soap and water. Then wash your hands as soon as you can.    Avoid touching your eyes, nose, and mouth.    At home and work, clean phones, computer keyboards, and toys often with disinfectant wipes.    If possible, avoid close contact with others who have the flu or symptoms of the flu.  Handwashing Tips  Handwashing is one of  the best ways to prevent many common infections. If youre caring for or visiting someone with the flu, wash your hands each time you enter and leave the room. Follow these steps:    Use warm water and plenty of soap. Rub your hands together well.    Clean the whole hand, under your nails, between your fingers, and up the wrists.    Wash for at least 15 seconds.    Rinse, letting the water run down your fingers, not up your wrists.    Dry your hands well. Use a paper towel to turn off the faucet and open the door.  Using Alcohol-Based Hand   Alcohol-based hand  are also a good choice. Use them when you dont have access to soap and water. Follow these steps:    Squeeze about a tablespoon of gel into the palm of one hand.    Rub your hands together briskly, cleaning the backs of your hands, the palms, between your fingers, and up the wrists.    Rub until the gel is gone and your hands are completely dry.  Preventing Influenza in Healthcare Settings  The flu is a special concern for people in hospitals and long-term care facilities. To help prevent the spread of flu, many hospitals and nursing homes take these steps:    Health care providers wash their hands or use an alcohol-based hand  before and after treating each patient.    People with the flu have private rooms and bathrooms or share a room with someone with the same infection.    High-risk patients who dont have the flu are encouraged to get the flu and pneumonia vaccines.    All health care workers are encouraged or required to get flu shots.        [id number] [employer]. [address] [place] [address], [place] [address]. All rights reserved. This information is not intended as a substitute for professional medical care. Always follow your healthcare professional's instructions.        Otitis Media (Middle-Ear Infection) in Adults  Otitis media is another name for a middle-ear infection. It means an infection behind your eardrum.  This kind of ear infection can happen after any condition that keeps fluid from draining from the middle ear. These conditions include allergies, a cold, a sore throat, or a respiratory infection.  Middle-ear infections are common in children, but they can also happen in adults. An ear infection in an adult may mean a more serious problem than in a child. So you may need additional tests. If you have an ear infection, you should see your health care provider for treatment.  What are the types of middle-ear infections?  Infections can affect the middle ear in several ways. They are:    Acute otitis media. This middle-ear infection occurs suddenly. It causes swelling and redness. Fluid and mucus become trapped inside the ear. You can have a fever and ear pain.    Otitis media with effusion. Fluid (effusion) and mucus build up in the middle ear after the infection goes away. You may feel like your middle ear is full. This can continue for months and may affect your hearing.    Chronic otitis media with effusion. Fluid (effusion) remains in the middle ear for a long time. Or it builds up again and again, even though there is no infection. This type of middle-ear infection may be hard to treat. It may also affect your hearing.  Who is more likely to get a middle-ear infection?  You are more likely to get an ear infection if you:    Smoke or are around someone who smokes    Have seasonal or year-round allergy symptoms    Have a cold or other upper respiratory infection  What causes a middle-ear infection?  The middle ear connects to the throat by a canal called the eustachian tube. This tube helps even out the pressure between the outer ear and the inner ear. A cold or allergy can irritate the tube or cause the area around it to swell. This can keep fluid from draining from the middle ear. The fluid builds up behind the eardrum. Bacteria and viruses can grow in this fluid. The bacteria and viruses cause the middle-ear  infection.  What are the symptoms of a middle-ear infection?  Common symptoms of a middle-ear infection in adults are:    Pain in 1 or both ears    Drainage from the ear    Muffled hearing    Sore throat   You may also have a fever. Rarely, your balance can be affected.  These symptoms may be the same as for other conditions. Its important to talk with your health care provider if you think you have a middle-ear infection. If you have a high fever, severe pain behind your ear, or paralysis in your face, see your provider as soon as you can.  How is a middle-ear infection diagnosed?  Your health care provider will take a medical history and do a physical exam. He or she will look at the outer ear and eardrum with an otoscope. The otoscope is a lighted tool that lets your provider see inside the ear. A pneumatic otoscope blows a puff of air into the ear to check how well your eardrum moves. If you eardrum doesnt move well, it may mean you have fluid behind it.  Your provider may also do a test called tympanometry. This test tells how well the middle ear is working. It can find any changes in pressure in the middle ear. Your provider may test your hearing with a tuning fork.  How is a middle-ear infection treated?  A middle-ear infection may be treated with:    Antibiotics, taken by mouth or as ear drops    Medication for pain    Decongestants, antihistamines, or nasal steroids  Your health care provider may also have you try autoinsufflation. This helps adjust the air pressure in your ear. For this, you pinch your nose and gently exhale. This forces air back through the eustachian tube.  The exact treatment for your ear infection will depend on the type of infection you have. In general, if your symptoms dont get better in 48 to 72 hours, contact your health care provider.  Middle-ear infections can cause long-term problems if not treated. They can lead to:    Infection in other parts of the head    Permanent hearing  loss    Paralysis of a nerve in your face  If you have a middle-ear infection that doesnt get better, you may need to see an ear, nose, and throat specialist (otolaryngologist). You may need a CT scan or MRI to check for head and neck cancer.  Ear tubes  Sometimes fluid stays in the middle ear even after you take antibiotics and the infection goes away. In this case, your health care provider may suggest that a small tube be placed in your ear. The tube is put at the opening of the eardrum. The tube keeps fluid from building up and relieves pressure in the middle ear. It can also help you hear better. This surgery is called myringotomy. It is not often done in adults.  The tubes usually fall out on their own after 6 months to a year.    [id number] [employer]. [address] [place] [address], [place] [address]. All rights reserved. This information is not intended as a substitute for professional medical care. Always follow your healthcare professional's instructions.          Simon Ya,

## 2021-07-06 VITALS
WEIGHT: 293 LBS | BODY MASS INDEX: 48.82 KG/M2 | TEMPERATURE: 98.2 F | HEIGHT: 65 IN | OXYGEN SATURATION: 98 % | HEART RATE: 73 BPM | DIASTOLIC BLOOD PRESSURE: 87 MMHG | SYSTOLIC BLOOD PRESSURE: 144 MMHG

## 2021-07-08 ENCOUNTER — OFFICE VISIT (OUTPATIENT)
Dept: RHEUMATOLOGY | Facility: CLINIC | Age: 36
End: 2021-07-08
Payer: COMMERCIAL

## 2021-07-08 ENCOUNTER — ANCILLARY PROCEDURE (OUTPATIENT)
Dept: GENERAL RADIOLOGY | Facility: CLINIC | Age: 36
End: 2021-07-08
Attending: STUDENT IN AN ORGANIZED HEALTH CARE EDUCATION/TRAINING PROGRAM
Payer: COMMERCIAL

## 2021-07-08 VITALS
HEART RATE: 73 BPM | BODY MASS INDEX: 48.82 KG/M2 | DIASTOLIC BLOOD PRESSURE: 85 MMHG | WEIGHT: 293 LBS | OXYGEN SATURATION: 99 % | HEIGHT: 65 IN | SYSTOLIC BLOOD PRESSURE: 132 MMHG | TEMPERATURE: 98.5 F

## 2021-07-08 DIAGNOSIS — E66.01 MORBID OBESITY (H): ICD-10-CM

## 2021-07-08 DIAGNOSIS — L40.50 PSORIATIC ARTHRITIS (H): Primary | ICD-10-CM

## 2021-07-08 DIAGNOSIS — L40.50 PSORIATIC ARTHRITIS (H): ICD-10-CM

## 2021-07-08 LAB
ALBUMIN SERPL-MCNC: 3.5 G/DL (ref 3.4–5)
ALT SERPL W P-5'-P-CCNC: 26 U/L (ref 0–50)
AST SERPL W P-5'-P-CCNC: 12 U/L (ref 0–45)
CREAT SERPL-MCNC: 0.7 MG/DL (ref 0.52–1.04)
CRP SERPL-MCNC: 31.8 MG/L (ref 0–8)
ERYTHROCYTE [DISTWIDTH] IN BLOOD BY AUTOMATED COUNT: 13.4 % (ref 10–15)
ERYTHROCYTE [SEDIMENTATION RATE] IN BLOOD BY WESTERGREN METHOD: 32 MM/H (ref 0–20)
GFR SERPL CREATININE-BSD FRML MDRD: >90 ML/MIN/{1.73_M2}
HCT VFR BLD AUTO: 39.2 % (ref 35–47)
HGB BLD-MCNC: 13 G/DL (ref 11.7–15.7)
MCH RBC QN AUTO: 30.7 PG (ref 26.5–33)
MCHC RBC AUTO-ENTMCNC: 33.2 G/DL (ref 31.5–36.5)
MCV RBC AUTO: 93 FL (ref 78–100)
PLATELET # BLD AUTO: 413 10E9/L (ref 150–450)
RBC # BLD AUTO: 4.23 10E12/L (ref 3.8–5.2)
WBC # BLD AUTO: 11.3 10E9/L (ref 4–11)

## 2021-07-08 PROCEDURE — 86200 CCP ANTIBODY: CPT | Performed by: STUDENT IN AN ORGANIZED HEALTH CARE EDUCATION/TRAINING PROGRAM

## 2021-07-08 PROCEDURE — 86803 HEPATITIS C AB TEST: CPT | Performed by: STUDENT IN AN ORGANIZED HEALTH CARE EDUCATION/TRAINING PROGRAM

## 2021-07-08 PROCEDURE — 72200 X-RAY EXAM SI JOINTS: CPT | Performed by: RADIOLOGY

## 2021-07-08 PROCEDURE — 85027 COMPLETE CBC AUTOMATED: CPT | Performed by: STUDENT IN AN ORGANIZED HEALTH CARE EDUCATION/TRAINING PROGRAM

## 2021-07-08 PROCEDURE — 82565 ASSAY OF CREATININE: CPT | Performed by: STUDENT IN AN ORGANIZED HEALTH CARE EDUCATION/TRAINING PROGRAM

## 2021-07-08 PROCEDURE — 86481 TB AG RESPONSE T-CELL SUSP: CPT | Performed by: STUDENT IN AN ORGANIZED HEALTH CARE EDUCATION/TRAINING PROGRAM

## 2021-07-08 PROCEDURE — 86140 C-REACTIVE PROTEIN: CPT | Performed by: STUDENT IN AN ORGANIZED HEALTH CARE EDUCATION/TRAINING PROGRAM

## 2021-07-08 PROCEDURE — 73630 X-RAY EXAM OF FOOT: CPT | Mod: RT | Performed by: RADIOLOGY

## 2021-07-08 PROCEDURE — 82040 ASSAY OF SERUM ALBUMIN: CPT | Performed by: STUDENT IN AN ORGANIZED HEALTH CARE EDUCATION/TRAINING PROGRAM

## 2021-07-08 PROCEDURE — 85652 RBC SED RATE AUTOMATED: CPT | Performed by: STUDENT IN AN ORGANIZED HEALTH CARE EDUCATION/TRAINING PROGRAM

## 2021-07-08 PROCEDURE — 99204 OFFICE O/P NEW MOD 45 MIN: CPT | Performed by: STUDENT IN AN ORGANIZED HEALTH CARE EDUCATION/TRAINING PROGRAM

## 2021-07-08 PROCEDURE — 36415 COLL VENOUS BLD VENIPUNCTURE: CPT | Performed by: STUDENT IN AN ORGANIZED HEALTH CARE EDUCATION/TRAINING PROGRAM

## 2021-07-08 PROCEDURE — 86704 HEP B CORE ANTIBODY TOTAL: CPT | Performed by: STUDENT IN AN ORGANIZED HEALTH CARE EDUCATION/TRAINING PROGRAM

## 2021-07-08 PROCEDURE — 87340 HEPATITIS B SURFACE AG IA: CPT | Performed by: STUDENT IN AN ORGANIZED HEALTH CARE EDUCATION/TRAINING PROGRAM

## 2021-07-08 PROCEDURE — 73130 X-RAY EXAM OF HAND: CPT | Mod: RT | Performed by: RADIOLOGY

## 2021-07-08 PROCEDURE — 84450 TRANSFERASE (AST) (SGOT): CPT | Performed by: STUDENT IN AN ORGANIZED HEALTH CARE EDUCATION/TRAINING PROGRAM

## 2021-07-08 PROCEDURE — 81374 HLA I TYPING 1 ANTIGEN LR: CPT | Performed by: STUDENT IN AN ORGANIZED HEALTH CARE EDUCATION/TRAINING PROGRAM

## 2021-07-08 PROCEDURE — 84460 ALANINE AMINO (ALT) (SGPT): CPT | Performed by: STUDENT IN AN ORGANIZED HEALTH CARE EDUCATION/TRAINING PROGRAM

## 2021-07-08 PROCEDURE — 73110 X-RAY EXAM OF WRIST: CPT | Mod: LT | Performed by: RADIOLOGY

## 2021-07-08 RX ORDER — MULTIPLE VITAMINS W/ MINERALS TAB 9MG-400MCG
TAB ORAL
COMMUNITY

## 2021-07-08 RX ORDER — CLOBETASOL PROPIONATE 0.5 MG/G
AEROSOL, FOAM TOPICAL
COMMUNITY
Start: 2020-09-14 | End: 2022-12-29

## 2021-07-08 RX ORDER — ACETAMINOPHEN 500 MG
TABLET ORAL
COMMUNITY

## 2021-07-08 RX ORDER — FLUOCINOLONE ACETONIDE 0.11 MG/ML
OIL TOPICAL
COMMUNITY
Start: 2020-10-20 | End: 2021-08-09

## 2021-07-08 RX ORDER — IBUPROFEN 200 MG
TABLET ORAL
COMMUNITY

## 2021-07-08 ASSESSMENT — PAIN SCALES - GENERAL: PAINLEVEL: SEVERE PAIN (6)

## 2021-07-08 ASSESSMENT — MIFFLIN-ST. JEOR: SCORE: 2193.65

## 2021-07-08 NOTE — PROGRESS NOTES
Rheumatology Clinic Visit     Alicia Smith MRN# [id number]   YOB: 1985 Age: 35 year old     Date of Visit: Jul 8, 2021   Primary care provider: Rosa Bentley          Assessment and Plan:     Assessment     Psoriatic arthritis  Psoriasis-scalp, ears  Dactylitis-third, fourth right toe  Plantar fasciitis  Morbid obesity  Lumbar degenerative disc disease    Ms. Smith is 35 year old female seen in clinic for evaluation of joint pains    Psoriatic arthritis: Pain in her joints started a year ago in the right leg. Pain radiated from right groin and into the thigh.  Due to history of lumbar degenerative disc disease, she followed up with spine specialist and had repeat MRI lumbar spine which did not show any significant changes compared to before.  She had bilateral hip injection which helped for a week. 4 months ago she noticed sausage like swelling of her right third fourth toes.  She has pain and tenderness over left wrist, right ankle and right knee.  She has noticed swelling over her wrist and ankles.  Also has numbness in the left thumb, index and middle finger.    Her clinical history, physical exam is consistent with psoriatic arthritis.  She has dactylitis, enthesitis in the form of plantar fasciitis.  She has synovitis in left wrist, bilateral ankles.    She will benefit from DMARD treatment.  I will get baseline CBC, liver, kidney function test.  In addition anti-CCP antibody will be done.  Recent KOLTON was negative.  X-rays of bilateral hand, wrist, feet and SI joints ordered to evaluate for erosive disease.    I discussed briefly about methotrexate.  Other option,  if she can tolerate methotrexate could be Otezla.  If evidence of SI inflammation is noted then biologic medications like anti-TNF inhibitors will be indicated.    She can continue Tylenol 1000 mg  2 times a day in addition to ibuprofen 600 mg 2-3 times a day.    Psoriasis: She has mild psoriasis on her scalp, ear canal.  Treatments  of psoriatic arthritis will help with psoriasis as well.    Lumbar degenerative disc disease: She also has lumbar degenerative disc disease.  Reports chronic low back pain and follows with orthopedic.    Morbid obesity: She is morbidly obese.  Once overall inflammation is under control she should focus on weight loss, physical exercise, pool therapy which will help her long-term.    Plan    You have Psoriatic arthritis.     I will get blood tests and X rays of hands, feet, lower SI joint    You can continue Tylenol 1000 mg and Ibuprofen 600 mg 2 - 3 times a day     Follow up in Aug 9th, 12:15.       -- Orders placed this encounter  Orders Placed This Encounter   Procedures     XR Hand Bilateral G/E 3 Views     X-ray bl Wrist G/E 3 vws     X-ray bl Foot 3+ views     X-ray Sacroiliac joint 1-2 vws     Cyclic Citrullinated Peptide Antibody IgG     Hepatitis B core antibody     Hepatitis B surface antigen     Hepatitis C antibody     CBC with platelets     AST     ALT     Albumin level     Creatinine     HLA-B27 Typing     CRP inflammation     Erythrocyte sedimentation rate auto       TIME SPENT:   A total of 60 minutes was spent on the patient today, greater than 50% of that time was spent on face to face counseling and care coordination regarding diagnoses and treatment options as mentioned above.                    Active Problem List:     Patient Active Problem List    Diagnosis Date Noted     Morbid obesity (H) 07/08/2021     Priority: Medium            History of Present Illness:   Alicia Smith is a 35 year old female with PMH of psoriasis, morbid obesity, lumbar degenerative disc disease seen in the clinic in consultation at request of Dr Bentley for evaluation of joint pains.    Her pain started a year ago in the right leg.  She has history of herniated disc due to lumbar degenerative disc disease since 2016 and initially attributed the right leg pain to that.  She was seen by spine specialist and had MRI of  the SI joint which again showed lumbar degenerative disc disease.  She did 16 weeks of physical therapy but did not notice any improvement. Pain in the right leg and thigh worsened.  Later it progressed to her left leg.  She had bilateral hip joint injection which worked only for a week.  Gradually  pain spread to other areas including left wrist, fingers, bilateral ankles, feet, both shoulders.  She has noticed swelling over her joints specially right knee, ankles, left wrist.  In April she developed sausagelike swelling of right third and fourth toes.  She has history of chronic plantar fasciitis off and on for 10 years.  Denies any pain in her heels or elbow tendinitis.  She has history of IBS but denies any bloody stools, colitis.  No history of uveitis.  Denies any family history of psoriasis, inflammatory bowel disease or other autoimmune connective tissue disease.    She had psoriasis for 2 to 3 years, mainly on her scalp and ear canals.    She takes 1000 MG Tyenol and 600 mg ibuprofen 2 - 3 times a day. It makes it manageable.     Denies any raynauds, malar rash, photosensitivity, recurrent mouth/genital ulcers, sicca symptoms, pleuritic chest pains, recurrent sinusitis/rhinitis, swallowing difficulty, hearing or visual changes recently. No h/o arterial/venous thrombosis in the past. Denies any tick bite, recent GI/ infection.             Review of Systems:     Review Of Systems  Constitutional: denies fever, chills, night sweats and weight loss.  Skin: + skin rash.  Eyes: No dryness or irritation in eyes. No episode of eye inflammation or redness.   Ears/Nose/Throat: no recurrent sinus infections.  Respiratory: No shortness of breath, dyspnea on exertion, cough, or hemoptysis  Cardiovascular: no chest pain or palpitations.  Gastrointestinal: no nausea, vomiting, abdominal pain.  Normal bowel movements.  Genitourinary: no dysuria, frequency  or hematuria.  Musculoskeletal: as in HPI  Neurologic: +  "numbness, tingling.  Psychiatric: no mood disorders.  Hematologic/Lymphatic/Immunologic: no history of easy bruising, petechia or purpura.  No abnormal bleeding.   Endocrine: no h/o thyroid disease or Diabetes.                  Past Medical History:   History reviewed. No pertinent past medical history.  Past Surgical History:   Procedure Laterality Date     LASIK Bilateral 2016            Social History:     Social History     Occupational History     Not on file   Tobacco Use     Smoking status: Never Smoker     Smokeless tobacco: Never Used   Substance and Sexual Activity     Alcohol use: Yes     Comment: rarely     Drug use: No     Sexual activity: Not on file            Family History:   History reviewed. No pertinent family history.         Allergies:   No Known Allergies         Medications:     Current Outpatient Medications   Medication Sig Dispense Refill     acetaminophen (TYLENOL) 500 MG tablet        Biotin w/ Vitamins C & E 1250-7.5-7.5 MCG-MG-UNT CHEW        Cholecalciferol (VITAMIN D) 2000 units tablet Take 1 tablet by mouth daily       clobetasol propionate (OLUX) 0.05 % external foam Apply foam to the scalp twice daily for up to 2 weeks.       fluocinolone acetonide (DERMA SMOOTHE/FS BODY) 0.01 % external oil        ibuprofen (ADVIL/MOTRIN) 200 MG tablet        Loratadine (CLARITIN PO) Take  by mouth.       multivitamin w/minerals (MULTIVITAMIN, THERAPEUTIC WITH MINERALS) tablet               Physical Exam:   Blood pressure 132/85, pulse 73, temperature 98.5  F (36.9  C), temperature source Oral, height 1.651 m (5' 5\"), weight (!) 150.3 kg (331 lb 4.8 oz), last menstrual period 06/06/2021, SpO2 99 %.  Wt Readings from Last 4 Encounters:   07/08/21 (!) 150.3 kg (331 lb 4.8 oz)   05/25/21 (!) 151 kg (333 lb)   04/21/21 147.5 kg (325 lb 3.2 oz)   11/30/20 146.3 kg (322 lb 8 oz)       Constitutional: Morbidly obese, appearing stated age; cooperative  Eyes: nl EOM, PERRLA, vision, conjunctiva, " sclera  ENT: nl external ears, psoriasis noted in ear canal, nose, hearing, lips, teeth, gums, throat  No mucous membrane lesions, normal saliva pool  Neck: no mass or thyroid enlargement  Resp: lungs clear to auscultation, nl to palpation  CV: RRR, no murmurs, rubs or gallops, no edema  GI: no ABD mass or tenderness, no HSM  : not tested  Lymph: no cervical, supraclavicular, inguinal or epitrochlear nodes    MS: All TMJ, neck, shoulder, elbow, wrist, MCP/PIP/DIP, spine, hip, knee, ankle, and foot MTP/IP joints were examined.   --Tenderness present over left wrist, no dactylitis noted over fingers, no tenderness present over MCP, PIP, DIP joints.  No synovitis or tenderness present over the right hand and wrist.  No tenderness present over bilateral elbows.  Range of motion of bilateral shoulders is limited to 110 degrees of flexion and abduction.    Tenderness present over right ankle, dactylitis present over third fourth toes.  Mild tenderness present over left ankle.  No dactylitis noted over left foot.  Plantar fasciitis present in the right foot.    No SI joint tenderness present.  Range of motion of right hip is limited due to pain.     Skin: no nail pitting, alopecia, nodules or lesions. psoriasis present over scalp, ear canal   neuro: nl cranial nerves, strength, sensation, DTRs.   Psych: nl judgement, orientation, memory, affect.         Data:     Outside studies reviewed: Records from [facility] hematology visit noted.  Negative MANUELA, rheumatoid factor, Lyme serology    Reviewed Rheumatology lab flowsheet    Benita Alejandro MD  [facility]  Department of Rheumatology & Autoimmune Disorders  [facility]: [phone]   Pager - [phone]

## 2021-07-08 NOTE — NURSING NOTE
"Alicia Smith's goals for this visit include: polyarthralgia  She requests these members of her care team be copied on today's visit information:     PCP: Rosa Bentley    Referring Provider:  No referring provider defined for this encounter.    /85 (BP Location: Left arm, Patient Position: Sitting, Cuff Size: Adult Large)   Pulse 73   Temp 98.5  F (36.9  C) (Oral)   Ht 1.651 m (5' 5\")   Wt (!) 150.3 kg (331 lb 4.8 oz)   LMP 06/06/2021 (Approximate)   SpO2 99%   BMI 55.13 kg/m      Do you need any medication refills at today's visit? NO     CRISTINA [facility]     "

## 2021-07-08 NOTE — PATIENT INSTRUCTIONS
You have Psoriatic arthritis.     I will get blood tests and X rays of hands, feet, lower SI joint    You can continue Tylenol 1000 mg and Ibuprofen 600 mg 2 - 3 times a day     Follow up in Aug 9th, 12:15.     Patient Education     Patient Education    Methotrexate Sodium Oral tablet    Methotrexate Sodium Solution for injection    Methotrexate Solution for injection  Methotrexate Sodium Oral tablet  What is this medicine?  METHOTREXATE (METH oh TREX ate) is a chemotherapy drug. This medicine affects cells that are rapidly growing, such as cancer cells and cells in your mouth and stomach. It is used to treat many cancers and other medical conditions. It is used for leukemias, lymphomas, breast cancer, lung cancer, head and neck cancers, and other cancers. This medicine also works on the immune system and is commonly used to treat psoriasis and rheumatoid arthritis. If used for arthritis or psoriasis, the drug is only given once a week.  This medicine may be used for other purposes; ask your health care provider or pharmacist if you have questions.  What should I tell my health care provider before I take this medicine?  They need to know if you have any of these conditions:    bleeding or blood disorders    HIV-positive or have acquired immunodeficiency syndrome (AIDS)    if you frequently drink alcohol-containing drinks    infection or weak immune system    kidney disease    liver disease    lung disease    stomach ulcers    ulcerative colitis    an unusual or allergic reaction to methotrexate, other medicines, foods, dyes, or preservatives    pregnant or trying to get pregnant    breast-feeding  How should I use this medicine?  Take this medicine by mouth. Swallow it with a full glass of water. Follow the directions on the prescription label. Do not take your medicine more often than directed. Finish the full course prescribed by your doctor or health care professional. Do not stop taking except on your doctor's  advice.  If you take methotrexate for rheumatoid arthritis or psoriasis, the dose is given only once a week. Do not take more frequently.  Talk to your pediatrician regarding the use of this medicine in children. Special care may be needed.  Overdosage: If you think you have taken too much of this medicine contact a poison control center or emergency room at once.  NOTE: This medicine is only for you. Do not share this medicine with others.  What if I miss a dose?  If you miss a dose, talk with your doctor or health care professional. Do not take double or extra doses. If you vomit after taking a dose, call your doctor or health care professional for advice.  What may interact with this medicine?    antibiotics and other medicines for infections    aspirin and aspirin-like medicines including bismuth subsalicylate (Pepto-Bismol)    NSAIDs, medicines for pain and inflammation, like ibuprofen or naproxen    probenecid    trimetrexate    vaccines  This list may not describe all possible interactions. Give your health care provider a list of all the medicines, herbs, non-prescription drugs, or dietary supplements you use. Also tell them if you smoke, drink alcohol, or use illegal drugs. Some items may interact with your medicine.  What should I watch for while using this medicine?  Visit your doctor or health care professional for checks on your progress. You will need to have regular blood checks. You will also need a chest X-ray before starting the medicine.  If you take the medicine for rheumatoid arthritis or psoriasis, you may not see an improvement in your condition for several weeks.  Do not drink alcohol-containing drinks while taking this medicine. Both alcohol and the medicine may cause damage to your liver.  This medicine may increase your risk of getting an infection. Stay away from people who are sick.  To protect your kidneys, drink water or other fluids as directed while you are taking this  medicine.  Both men and women must use effective birth control. Use 2 reliable forms of birth control together. Do not become pregnant while taking this medicine. Women should continue to use birth control until after their first normal menstrual cycle after stopping the medicine. Call your doctor right away if you think you or your partner might be pregnant. There is a potential for serious side effects to an unborn child. Talk to your health care professional or pharmacist for more information. Do not breast-feed an infant while taking this medicine. Men should continue to use birth control for at least 3 months after stopping the medicine.  If you are going to have surgery or dental work, tell your health care professional that you are taking this medicine.  This medicine can make you more sensitive to the sun. Keep out of the sun. If you cannot avoid being in the sun, wear protective clothing and use sunscreen. Do not use sun lamps or tanning beds/booths.  What side effects may I notice from receiving this medicine?  Side effects that you should report to your doctor or health care professional as soon as possible:    bruising, pinpoint red spots on the skin, black, tarry stools, blood in the urine    changes in vision    diarrhea    difficulty breathing or a dry cough    mouth and throat ulcers    redness, blistering, peeling or loosening of the skin, including inside the mouth    skin rash, hives, or itching    symptoms of infection like fever or chills, cough, sore throat, pain or difficulty passing urine    unusually weak or tired, fainting spells    vomiting    yellow coloring of skin or eyes  Side effects that usually do not require medical attention (report to your doctor or health care professional if they continue or are bothersome):    dizziness    drowsiness    loss of appetite    nausea  This list may not describe all possible side effects. Call your doctor for medical advice about side effects. You  may report side effects to FDA at 4-040-VAJ-1819.  Where should I keep my medicine?  Keep out of the reach of children.  Store at room temperature between 20 and 25 degrees C (68 and 77 degrees F). Protect from light. Throw away any unused medicine after the expiration date.  NOTE:This sheet is a summary. It may not cover all possible information. If you have questions about this medicine, talk to your doctor, pharmacist, or health care provider. Copyright  2016 Gold Standard

## 2021-07-09 LAB
CCP AB SER IA-ACNC: <1 U/ML
GAMMA INTERFERON BACKGROUND BLD IA-ACNC: 0.01 IU/ML
HBV CORE AB SERPL QL IA: NONREACTIVE
HBV SURFACE AG SERPL QL IA: NONREACTIVE
HCV AB SERPL QL IA: NONREACTIVE
M TB IFN-G CD4+ BCKGRND COR BLD-ACNC: 9.99 IU/ML
M TB TUBERC IFN-G BLD QL: NEGATIVE
MITOGEN IGNF BCKGRD COR BLD-ACNC: 0 IU/ML
MITOGEN IGNF BCKGRD COR BLD-ACNC: 0.01 IU/ML

## 2021-07-20 ENCOUNTER — MYC MEDICAL ADVICE (OUTPATIENT)
Dept: RHEUMATOLOGY | Facility: CLINIC | Age: 36
End: 2021-07-20

## 2021-07-20 DIAGNOSIS — L40.50 PSORIATIC ARTHRITIS (H): Primary | ICD-10-CM

## 2021-07-21 ENCOUNTER — TELEPHONE (OUTPATIENT)
Dept: RHEUMATOLOGY | Facility: CLINIC | Age: 36
End: 2021-07-21

## 2021-07-21 RX ORDER — PREDNISONE 5 MG/1
TABLET ORAL
Qty: 100 TABLET | Refills: 2 | Status: SHIPPED | OUTPATIENT
Start: 2021-07-21 | End: 2021-11-23

## 2021-07-21 NOTE — TELEPHONE ENCOUNTER
PA Initiation    Medication: OTEZLA   Insurance Company: [employer] - Phone [phone] Fax [phone]  Pharmacy Filling the Rx: [facility] - [place], [place] - [facility] [address]  Filling Pharmacy Phone:    Filling Pharmacy Fax:    Start Date: 7/21/2021    KRISTEL DEVINE ([place]ord: [email or web address])  Otezla 10 & 20 & 30MG tablets

## 2021-07-21 NOTE — TELEPHONE ENCOUNTER
Please advise and give recommendations per patient mychart message. Thank you.      SUZETTE Banks [facility]

## 2021-07-21 NOTE — PATIENT INSTRUCTIONS
Patient Education     Patient Education    Apremilast Oral tablet    Apremilast Oral tablet, Apremilast Oral tablet, Apremilast Oral tablet  Apremilast Oral tablet  What is this medicine?  APREMILAST (a PRE mil ast) is used to treat plaque psoriasis and psoriatic arthritis.  This medicine may be used for other purposes; ask your health care provider or pharmacist if you have questions.  What should I tell my health care provider before I take this medicine?  They need to know if you have any of these conditions:    kidney disease    mental illness    an unusual or allergic reaction to apremilast, other medicines, foods, dyes, or preservatives    pregnant or trying to get pregnant    breast-feeding  How should I use this medicine?  Take this medicine by mouth with a glass of water. Follow the directions on the prescription label. Do not cut, crush or chew this medicine. You can take it with or without food. If it upsets your stomach, take it with food. Take your medicine at regular intervals. Do not take it more often than directed. Do not stop taking except on your doctor's advice.  Talk to your pediatrician regarding the use of this medicine in children. Special care may be needed.  Overdosage: If you think you have taken too much of this medicine contact a poison control center or emergency room at once.  NOTE: This medicine is only for you. Do not share this medicine with others.  What if I miss a dose?  If you miss a dose, take it as soon as you can. If it is almost time for your next dose, take only that dose. Do not take double or extra doses.  What may interact with this medicine?  This medicine may interact with the following medications:    certain medicines for seizures like carbamazepine, phenobarbital, phenytoin    rifampin  This list may not describe all possible interactions. Give your health care provider a list of all the medicines, herbs, non-prescription drugs, or dietary supplements you use.  Also tell them if you smoke, drink alcohol, or use illegal drugs. Some items may interact with your medicine.  What should I watch for while using this medicine?  Tell your doctor or healthcare professional if your symptoms do not start to get better or if they get worse.  Patients and their families should watch out for new or worsening depression or thoughts of suicide. Also watch out for sudden changes in feelings such as feeling anxious, agitated, panicky, irritable, hostile, aggressive, impulsive, severely restless, overly excited and hyperactive, or not being able to sleep. If this happens, call your health care professional.  What side effects may I notice from receiving this medicine?  Side effects that you should report to your doctor or health care professional as soon as possible:    depressed mood    weight loss  Side effects that usually do not require medical attention (report these to your doctor or health care professional if they continue or are bothersome.):    diarrhea    headache    nausea, vomiting  This list may not describe all possible side effects. Call your doctor for medical advice about side effects. You may report side effects to FDA at 3-300-HIT-3731.  Where should I keep my medicine?  Keep out of the reach of children.  Store below 30 degrees C (86 degrees F). Throw away any unused medicine after the expiration date.  NOTE: This sheet is a summary. It may not cover all possible information. If you have questions about this medicine, talk to your doctor, pharmacist, or health care provider.  NOTE:This sheet is a summary. It may not cover all possible information. If you have questions about this medicine, talk to your doctor, pharmacist, or health care provider. Copyright  2016 Gold Standard

## 2021-07-22 NOTE — TELEPHONE ENCOUNTER
Prior Authorization Approval    Authorization Effective Date: 7/22/2021  Authorization Expiration Date: 7/22/2022  Medication: OTEZLA - Approved   Approved Dose/Quantity:  Starter and maintenance   Reference #:     Insurance Company: [employer] - Phone [phone] Fax [phone]  Expected CoPay: $5     CoPay Card Available:      Foundation Assistance Needed:    Which Pharmacy is filling the prescription (Not needed for infusion/clinic administered): [facility] - [place], [place] - [facility] [address]  Pharmacy Notified: Yes  Patient Notified: Yes         Statement Selected

## 2021-07-22 NOTE — TELEPHONE ENCOUNTER
Question set faxed, [facility] questions [facility]

## 2021-08-02 LAB
B LOCUS: NORMAL
B27TEST METHOD: NORMAL

## 2021-08-02 NOTE — PROGRESS NOTES
Alicia is a 35 year old who is being evaluated via a billable video visit.      How would you like to obtain your AVS? MyChart  If the video visit is dropped, the invitation should be resent by: Text to cell phone: [phone]  Will anyone else be joining your video visit? No      Video Start Time: 12:30 PM    Rheumatology Clinic Visit     Alicia Smith MRN# [id number]   YOB: 1985 Age: 35 year old     Date of Visit: Aug 9, 2021   Primary care provider: Rosa Bentley          Assessment and Plan:     Assessment     Psoriatic arthritis  Psoriasis-scalp, ears  Dactylitis-third, fourth right toe  Plantar fasciitis  Morbid obesity  Lumbar degenerative disc disease    Ms. Smith is 35 year old female evaluated via billable virtual visit for management of PsA.     Psoriatic arthritis: Pain in her joints started a year ago in the right leg. Pain radiated from right groin into the thigh.  Due to history of lumbar degenerative disc disease, she followed up with spine specialist and had repeat MRI lumbar spine which did not show any significant changes as compared to before.  She had bilateral hip injection which helped for a week. In 4/2021 she noticed sausage like swelling of her right third, fourth toes.  She had pain and tenderness over left wrist, right ankle and right knee and swelling over her wrist and ankles.  Also had numbness in the left thumb, index and middle finger.    Her clinical history, physical exam is consistent with psoriatic arthritis.  She has dactylitis, enthesitis in the form of plantar fasciitis.  She has synovitis in left wrist, bilateral ankles.    She has negative rheumatoid factor, anti-CCP, HLA-B27 genotype.  X-ray of bilateral hand, wrist, feet showed no erosive disease.  She was given prednisone taper and responded very well.  Otezla was started few weeks ago for psoriatic arthritis.  Denies any side effects.  Methotrexate and other oral DMARDs were not preferred due to heavy  alcohol intake.    Psoriasis: She has mild psoriasis on her scalp, ear canal.  Treatments of psoriatic arthritis will help with psoriasis as well.    Lumbar degenerative disc disease: She also has lumbar degenerative disc disease.  Reports chronic low back pain and follows with orthopedic.    Morbid obesity: She is morbidly obese.  Once overall inflammation is under control she should focus on weight loss, physical exercise, pool therapy which will help her long-term.    Plan    Taper prednisone by 2.5 mg every 2 weeks to off.     Continue Otezla 30 mg BID    Labs in 2 weeks    Follow up in 3 months with labs.               Active Problem List:     Patient Active Problem List    Diagnosis Date Noted     Morbid obesity (H) 07/08/2021     Priority: Medium            History of Present Illness:   Alicia Smith is a 35 year old female with PMH of psoriasis, morbid obesity, lumbar degenerative disc disease evaluated by billable virtual visit for management of PsA.     Her pain started a year ago in the right leg.  She has history of herniated disc due to lumbar degenerative disc disease since 2016 and initially attributed the right leg pain to that.  She was seen by spine specialist and had MRI of the SI joint which again showed lumbar degenerative disc disease.  She did 16 weeks of physical therapy but did not notice any improvement. Pain in the right leg and thigh worsened.  Later it progressed to her left leg.  She had bilateral hip joint injection which worked only for a week.  Gradually  pain spread to other areas including left wrist, fingers, bilateral ankles, feet, both shoulders.  She has noticed swelling over her joints specially right knee, ankles, left wrist.  In April she developed sausagelike swelling of right third and fourth toes.  She has history of chronic plantar fasciitis off and on for 10 years.  Denies any pain in her heels or elbow tendinitis.  She has history of IBS but denies any bloody stools,  colitis.  No history of uveitis.  Denies any family history of psoriasis, inflammatory bowel disease or other autoimmune connective tissue disease.    She had psoriasis for 2 to 3 years, mainly on her scalp and ear canals.    She takes 1000 MG Tyenol and 600 mg ibuprofen 2 - 3 times a day. It makes it manageable.     Denies any raynauds, malar rash, photosensitivity, recurrent mouth/genital ulcers, sicca symptoms, pleuritic chest pains, recurrent sinusitis/rhinitis, swallowing difficulty, hearing or visual changes recently. No h/o arterial/venous thrombosis in the past. Denies any tick bite, recent GI/ infection.     August 9, 2021 - Autoimmune work-up showed elevated ESR, CRP, negative rheumatoid factor, anti-CCP, HLA-B27 negative.  X-rays of bilateral hand, wrist and feet did not show any evidence of erosive disease.  SI joint x-ray showed no sacroiliitis.  She was given prednisone taper to help with inflammatory arthritis.  She started on 20 mg and noticed improvement.  She is on 10 mg dose now.  Her clinical history, physical exam is suggestive of psoriatic arthritis.  She reports history of chronic plantar fasciitis, dactylitis and has migratory joint pain and swelling.  She started Otezla for psoriatic arthritis couple weeks ago and has noticed mild improvement, denies any side effects with Otezla use.  Her toes are still swollen but slightly better than before.           Review of Systems:     Review Of Systems  Constitutional: denies fever, chills, night sweats and weight loss.  Skin: + skin rash.  Eyes: No dryness or irritation in eyes. No episode of eye inflammation or redness.   Ears/Nose/Throat: no recurrent sinus infections.  Respiratory: No shortness of breath, dyspnea on exertion, cough, or hemoptysis  Cardiovascular: no chest pain or palpitations.  Gastrointestinal: no nausea, vomiting, abdominal pain.  Normal bowel movements.  Genitourinary: no dysuria, frequency  or hematuria.  Musculoskeletal:  as in HPI  Neurologic: + numbness, tingling.  Psychiatric: no mood disorders.  Hematologic/Lymphatic/Immunologic: no history of easy bruising, petechia or purpura.  No abnormal bleeding.   Endocrine: no h/o thyroid disease or Diabetes.                  Past Medical History:     Past Medical History:   Diagnosis Date     Acute pancreatitis 2016     GERD (gastroesophageal reflux disease)      Infective otitis externa, right 08/29/2018     Irritable bowel syndrome      Lumbar radiculopathy      Psoriasis      Vitamin D deficiency      Past Surgical History:   Procedure Laterality Date     LASIK Bilateral 2016            Social History:     Social History     Occupational History     Not on file   Tobacco Use     Smoking status: Never Smoker     Smokeless tobacco: Never Used   Substance and Sexual Activity     Alcohol use: Yes     Comment: rarely     Drug use: No     Sexual activity: Not on file            Family History:     Family History   Problem Relation Age of Onset     Hypertension Other      Hyperlipidemia Mother      Diabetes Type 2  Mother      Obesity Father      Hypertension Father      Diabetes Type 2  Brother      Obesity Brother             Allergies:   No Known Allergies         Medications:     Current Outpatient Medications   Medication Sig Dispense Refill     acetaminophen (TYLENOL) 500 MG tablet        Apremilast (OTEZLA) 10 & 20 & 30 MG TBPK Take by mouth according to the instructions on the packet. Hold for signs of infection,any GI upset, weight loss or depression concerns, and seek medical attention. 1 each 0     apremilast (OTEZLA) 30 MG tablet Take 1 tablet (30 mg) by mouth 2 times daily Hold for signs of infection, and seek medical attention. 60 tablet 5     Biotin w/ Vitamins C & E 1250-7.5-7.5 MCG-MG-UNT CHEW        Cholecalciferol (VITAMIN D) 2000 units tablet Take 1 tablet by mouth daily       clobetasol propionate (OLUX) 0.05 % external foam Apply foam to the scalp twice daily for up to 2  weeks.       ibuprofen (ADVIL/MOTRIN) 200 MG tablet        Loratadine (CLARITIN PO) Take  by mouth.       multivitamin w/minerals (MULTIVITAMIN, THERAPEUTIC WITH MINERALS) tablet        predniSONE (DELTASONE) 5 MG tablet 4tab=20 mg qd x 7 days, 3tab=15 mg qd x 7 days, 2tab=10 mg qd x 7 days, 1.5tab=7.5 mg qd x 7 days, 1tab = 5 mg daily. 100 tablet 2            Physical Exam:   There were no vitals taken for this visit.  Wt Readings from Last 4 Encounters:   07/08/21 (!) 150.3 kg (331 lb 4.8 oz)   05/25/21 (!) 151 kg (333 lb)   04/21/21 147.5 kg (325 lb 3.2 oz)   11/30/20 146.3 kg (322 lb 8 oz)       Constitutional: Morbidly obese, appearing stated age; cooperative    MS: Tenderness present over left wrist, no dactylitis noted over fingers, no tenderness present over MCP, PIP, DIP joints.  No synovitis or tenderness present over the right hand and wrist.  No tenderness present over bilateral elbows.  Range of motion of bilateral shoulders is limited to 110 degrees of flexion and abduction.    Tenderness present over right ankle, dactylitis present over third fourth toes.  Mild tenderness present over left ankle.  No dactylitis noted over left foot.  Plantar fasciitis present in the right foot.    No SI joint tenderness present.  Range of motion of right hip is limited due to pain.            Data:     Outside studies reviewed: Records from [facility] hematology visit noted.  Negative MANUELA, rheumatoid factor, Lyme serology    Reviewed Rheumatology lab flowsheet    Benita Alejandro MD  [facility]  Department of Rheumatology & Autoimmune Disorders  [facility]: [phone]   Pager - [phone]    Video-Visit Details    Type of service:  Video Visit    Video End Time:12:45 PM    Originating Location (pt. Location): Home    Distant Location (provider location):  [facility]     Platform used for Video Visit: "[employer]

## 2021-08-09 ENCOUNTER — VIRTUAL VISIT (OUTPATIENT)
Dept: RHEUMATOLOGY | Facility: CLINIC | Age: 36
End: 2021-08-09
Payer: COMMERCIAL

## 2021-08-09 DIAGNOSIS — L40.50 PSORIATIC ARTHRITIS (H): Primary | ICD-10-CM

## 2021-08-09 PROCEDURE — 99214 OFFICE O/P EST MOD 30 MIN: CPT | Mod: 95 | Performed by: STUDENT IN AN ORGANIZED HEALTH CARE EDUCATION/TRAINING PROGRAM

## 2021-08-09 NOTE — PATIENT INSTRUCTIONS
Taper prednisone by 2.5 mg every 2 weeks to off.     Continue Otezla     Labs in 2 weeks    Follow up in 3 months with labs.

## 2021-08-09 NOTE — RESULT ENCOUNTER NOTE
Results discussed with the patient at the time of visit.     Benita Alejandro MD   8/9/2021  12:31 PM

## 2021-08-18 ENCOUNTER — LAB (OUTPATIENT)
Dept: LAB | Facility: CLINIC | Age: 36
End: 2021-08-18
Payer: COMMERCIAL

## 2021-08-18 DIAGNOSIS — L40.50 PSORIATIC ARTHRITIS (H): ICD-10-CM

## 2021-08-18 LAB
ALBUMIN SERPL-MCNC: 3.6 G/DL (ref 3.4–5)
ALT SERPL W P-5'-P-CCNC: 33 U/L (ref 0–50)
AST SERPL W P-5'-P-CCNC: 15 U/L (ref 0–45)
CREAT SERPL-MCNC: 0.82 MG/DL (ref 0.52–1.04)
CRP SERPL-MCNC: 31.4 MG/L (ref 0–8)
ERYTHROCYTE [DISTWIDTH] IN BLOOD BY AUTOMATED COUNT: 13.6 % (ref 10–15)
ERYTHROCYTE [SEDIMENTATION RATE] IN BLOOD BY WESTERGREN METHOD: 28 MM/HR (ref 0–20)
GFR SERPL CREATININE-BSD FRML MDRD: >90 ML/MIN/1.73M2
HCT VFR BLD AUTO: 39.2 % (ref 35–47)
HGB BLD-MCNC: 13 G/DL (ref 11.7–15.7)
MCH RBC QN AUTO: 31 PG (ref 26.5–33)
MCHC RBC AUTO-ENTMCNC: 33.2 G/DL (ref 31.5–36.5)
MCV RBC AUTO: 93 FL (ref 78–100)
PLATELET # BLD AUTO: 335 10E3/UL (ref 150–450)
RBC # BLD AUTO: 4.2 10E6/UL (ref 3.8–5.2)
WBC # BLD AUTO: 12.7 10E3/UL (ref 4–11)

## 2021-08-18 PROCEDURE — 82040 ASSAY OF SERUM ALBUMIN: CPT

## 2021-08-18 PROCEDURE — 85652 RBC SED RATE AUTOMATED: CPT

## 2021-08-18 PROCEDURE — 86140 C-REACTIVE PROTEIN: CPT

## 2021-08-18 PROCEDURE — 36415 COLL VENOUS BLD VENIPUNCTURE: CPT

## 2021-08-18 PROCEDURE — 82565 ASSAY OF CREATININE: CPT

## 2021-08-18 PROCEDURE — 84450 TRANSFERASE (AST) (SGOT): CPT

## 2021-08-18 PROCEDURE — 84460 ALANINE AMINO (ALT) (SGPT): CPT

## 2021-08-18 PROCEDURE — 85027 COMPLETE CBC AUTOMATED: CPT

## 2021-10-02 ENCOUNTER — HEALTH MAINTENANCE LETTER (OUTPATIENT)
Age: 36
End: 2021-10-02

## 2021-11-16 NOTE — PROGRESS NOTES
Alicia is a 35 year old who is being evaluated via a billable video visit.      How would you like to obtain your AVS? MyChart  If the video visit is dropped, the invitation should be resent by: Text to cell phone: [phone]  Will anyone else be joining your video visit? No      Video Start Time: 12:37 PM    Rheumatology Visit     Alicia Smith MRN# [id number]   YOB: 1985 Age: 35 year old     Date of Visit: Nov 23, 2021   Primary care provider: Rosa Bentley          Assessment and Plan:     Assessment     Psoriatic arthritis  Psoriasis-scalp, ears  Dactylitis-third, fourth right toe  Plantar fasciitis  Morbid obesity  Lumbar degenerative disc disease    Ms. Smith is 36 year old female evaluated via billable virtual visit for management of PsA.     Psoriatic arthritis: Pain in her joints started a year ago in the right leg. Pain radiated from right groin into the thigh.  Due to history of lumbar degenerative disc disease, she followed up with spine specialist and had repeat MRI lumbar spine which did not show any significant changes as compared to before.  She had bilateral hip injection which helped for a week. In 4/2021 she noticed sausage like swelling of her right third, fourth toes.  She had pain and tenderness over left wrist, right ankle and right knee and swelling over her wrist and ankles.  Also had numbness in the left thumb, index and middle finger.    Her clinical history, physical exam is consistent with psoriatic arthritis.  She has dactylitis, enthesitis in the form of plantar fasciitis.  She has synovitis in left wrist, bilateral ankles.    She has negative rheumatoid factor, anti-CCP, HLA-B27 genotype.  X-ray of bilateral hand, wrist, feet showed no erosive disease.  She was given prednisone taper and responded very well.  Otezla was started in 8/2021 for psoriatic arthritis.  Denies any side effects.  It cleared psoriasis, it is 70% better.  Still reports joint pains.  Some days  are worse than the other.  We discussed about adding oral DMARDs.  Methotrexate is not preferable due to heavy alcohol intake.  Will start sulfasalazine.  Side effects of sulfasalazine including hypersensitivity reaction, photosensitivity, hepatotoxicity, GI side effects, leukopenia discussed with patient.    Psoriasis: Psoriasis is better 70% on Otezla.      Lumbar degenerative disc disease: She also has lumbar degenerative disc disease.  Reports chronic low back pain and follows with orthopedic.    Morbid obesity: She is morbidly obese.  Once overall inflammation is under control she should focus on weight loss, physical exercise, pool therapy which will help her long-term.    Plan    Will add sulfasalazine to Otezla     Start 500 mg twice a day dose and increase by 1 tab every week to reach at 2 tab twice a day dose.     Blood tests to be done now and then in a month after starting SSZ and then in 3 months before follow up.     Follow up in 3 months with labs.             Active Problem List:     Patient Active Problem List    Diagnosis Date Noted     Morbid obesity (H) 07/08/2021     Priority: Medium            History of Present Illness:   Alicia Smith is a 36 year old female with PMH of psoriasis, morbid obesity, lumbar degenerative disc disease evaluated by billable virtual visit for management of PsA.     Her pain started a year ago in the right leg.  She has history of herniated disc due to lumbar degenerative disc disease since 2016 and initially attributed the right leg pain to that.  She was seen by spine specialist and had MRI of the SI joint which again showed lumbar degenerative disc disease.  She did 16 weeks of physical therapy but did not notice any improvement. Pain in the right leg and thigh worsened.  Later it progressed to her left leg.  She had bilateral hip joint injection which worked only for a week.  Gradually  pain spread to other areas including left wrist, fingers, bilateral ankles,  feet, both shoulders.  She has noticed swelling over her joints specially right knee, ankles, left wrist.  In April she developed sausagelike swelling of right third and fourth toes.  She has history of chronic plantar fasciitis off and on for 10 years.  Denies any pain in her heels or elbow tendinitis.  She has history of IBS but denies any bloody stools, colitis.  No history of uveitis.  Denies any family history of psoriasis, inflammatory bowel disease or other autoimmune connective tissue disease.    She had psoriasis for 2 to 3 years, mainly on her scalp and ear canals.    She takes 1000 MG Tyenol and 600 mg ibuprofen 2 - 3 times a day. It makes it manageable.     Denies any raynauds, malar rash, photosensitivity, recurrent mouth/genital ulcers, sicca symptoms, pleuritic chest pains, recurrent sinusitis/rhinitis, swallowing difficulty, hearing or visual changes recently. No h/o arterial/venous thrombosis in the past. Denies any tick bite, recent GI/ infection.     August 9, 2021 - Autoimmune work-up showed elevated ESR, CRP, negative rheumatoid factor, anti-CCP, HLA-B27 negative.  X-rays of bilateral hand, wrist and feet did not show any evidence of erosive disease.  SI joint x-ray showed no sacroiliitis.  She was given prednisone taper to help with inflammatory arthritis.  She started on 20 mg and noticed improvement.  She is on 10 mg dose now.  Her clinical history, physical exam is suggestive of psoriatic arthritis.  She reports history of chronic plantar fasciitis, dactylitis and has migratory joint pain and swelling.  She started Otezla for psoriatic arthritis couple weeks ago and has noticed mild improvement, denies any side effects with Otezla use.  Her toes are still swollen but slightly better than before.      November 23, 2021 - She took prednisone taper after her last visit and did notice improvement in her joint pains.  Otezla was started couple months ago.  She has noticed improvement with  psoriasis, to 70% better.  She still has joint pains.  Some days are worse than the other.  Main pain is in her hands, foot, knees and ankles.  Right middle finger is still swollen.         Review of Systems:     Review Of Systems  Constitutional: denies fever, chills, night sweats and weight loss.  Skin: + skin rash.  Eyes: No dryness or irritation in eyes. No episode of eye inflammation or redness.   Ears/Nose/Throat: no recurrent sinus infections.  Respiratory: No shortness of breath, dyspnea on exertion, cough, or hemoptysis  Cardiovascular: no chest pain or palpitations.  Gastrointestinal: no nausea, vomiting, abdominal pain.  Normal bowel movements.  Genitourinary: no dysuria, frequency  or hematuria.  Musculoskeletal: as in HPI  Neurologic: + numbness, tingling.  Psychiatric: no mood disorders.  Hematologic/Lymphatic/Immunologic: no history of easy bruising, petechia or purpura.  No abnormal bleeding.   Endocrine: no h/o thyroid disease or Diabetes.                  Past Medical History:     Past Medical History:   Diagnosis Date     Acute pancreatitis 2016     GERD (gastroesophageal reflux disease)      Infective otitis externa, right 08/29/2018     Irritable bowel syndrome      Lumbar radiculopathy      Psoriasis      Vitamin D deficiency      Past Surgical History:   Procedure Laterality Date     LASIK Bilateral 2016            Social History:     Social History     Occupational History     Not on file   Tobacco Use     Smoking status: Never Smoker     Smokeless tobacco: Never Used   Substance and Sexual Activity     Alcohol use: Yes     Comment: rarely     Drug use: No     Sexual activity: Yes            Family History:     Family History   Problem Relation Age of Onset     Hypertension Other      Hyperlipidemia Mother      Diabetes Type 2  Mother      Obesity Father      Hypertension Father      Diabetes Type 2  Brother      Obesity Brother             Allergies:   No Known Allergies         Medications:      Current Outpatient Medications   Medication Sig Dispense Refill     acetaminophen (TYLENOL) 500 MG tablet        apremilast (OTEZLA) 30 MG tablet Take 1 tablet (30 mg) by mouth 2 times daily Hold for signs of infection, and seek medical attention. 60 tablet 5     Biotin w/ Vitamins C & E 1250-7.5-7.5 MCG-MG-UNT CHEW        Cholecalciferol (VITAMIN D) 2000 units tablet Take 1 tablet by mouth daily       clobetasol propionate (OLUX) 0.05 % external foam Apply foam to the scalp twice daily for up to 2 weeks.       ibuprofen (ADVIL/MOTRIN) 200 MG tablet        Loratadine (CLARITIN PO) Take  by mouth.       multivitamin w/minerals (MULTIVITAMIN, THERAPEUTIC WITH MINERALS) tablet        predniSONE (DELTASONE) 5 MG tablet 4tab=20 mg qd x 7 days, 3tab=15 mg qd x 7 days, 2tab=10 mg qd x 7 days, 1.5tab=7.5 mg qd x 7 days, 1tab = 5 mg daily. 100 tablet 2     Apremilast (OTEZLA) 10 & 20 & 30 MG TBPK Take by mouth according to the instructions on the packet. Hold for signs of infection,any GI upset, weight loss or depression concerns, and seek medical attention. 1 each 0            Physical Exam:   There were no vitals taken for this visit.  Wt Readings from Last 4 Encounters:   07/08/21 (!) 150.3 kg (331 lb 4.8 oz)   05/25/21 (!) 151 kg (333 lb)   04/21/21 147.5 kg (325 lb 3.2 oz)   11/30/20 146.3 kg (322 lb 8 oz)       Constitutional: Morbidly obese, appearing stated age; cooperative    MS: Tenderness present over left wrist, no dactylitis noted over fingers, no tenderness present over MCP, PIP, DIP joints.  No synovitis or tenderness present over the right hand and wrist.  No tenderness present over bilateral elbows.  Range of motion of bilateral shoulders is limited to 110 degrees of flexion and abduction.    Tenderness present over right ankle, dactylitis present over third fourth toes.  Mild tenderness present over left ankle.  No dactylitis noted over left foot.  Plantar fasciitis present in the right foot.    No SI  joint tenderness present.  Range of motion of right hip is limited due to pain.            Data:     Outside studies reviewed: Records from [facility] hematology visit noted.  Negative MANUELA, rheumatoid factor, Lyme serology    Reviewed Rheumatology lab flowsheet    Benita Alejandro MD  [facility]  Department of Rheumatology & Autoimmune Disorders  [facility]: [phone]   Pager - [phone]    Video-Visit Details    Type of service:  Video Visit    Video End Time:12:50 PM    Originating Location (pt. Location): Home    Distant Location (provider location):  [facility]     Platform used for Video Visit: [employer]

## 2021-11-23 ENCOUNTER — VIRTUAL VISIT (OUTPATIENT)
Dept: RHEUMATOLOGY | Facility: CLINIC | Age: 36
End: 2021-11-23
Payer: COMMERCIAL

## 2021-11-23 DIAGNOSIS — L40.50 PSORIATIC ARTHRITIS (H): Primary | ICD-10-CM

## 2021-11-23 PROCEDURE — 99214 OFFICE O/P EST MOD 30 MIN: CPT | Mod: 95 | Performed by: STUDENT IN AN ORGANIZED HEALTH CARE EDUCATION/TRAINING PROGRAM

## 2021-11-23 RX ORDER — SULFASALAZINE 500 MG/1
TABLET ORAL
Qty: 120 TABLET | Refills: 1 | Status: SHIPPED | OUTPATIENT
Start: 2021-11-23 | End: 2022-03-01

## 2021-11-23 NOTE — PATIENT INSTRUCTIONS
Will add sulfasalazine to Otezla     Start 500 mg twice a day dose and increase by 1 tab every week to reach at 2 tab twice a day dose.     Blood tests to be done now and then in a month after starting SSZ and then in 3 months before follow up.     Follow up in 3 months with labs.

## 2021-11-29 ENCOUNTER — LAB (OUTPATIENT)
Dept: LAB | Facility: CLINIC | Age: 36
End: 2021-11-29
Payer: COMMERCIAL

## 2021-11-29 DIAGNOSIS — L40.50 PSORIATIC ARTHRITIS (H): ICD-10-CM

## 2021-11-29 LAB
ALBUMIN SERPL-MCNC: 3.2 G/DL (ref 3.4–5)
ALT SERPL W P-5'-P-CCNC: 24 U/L (ref 0–50)
AST SERPL W P-5'-P-CCNC: 12 U/L (ref 0–45)
CREAT SERPL-MCNC: 0.76 MG/DL (ref 0.52–1.04)
CRP SERPL-MCNC: 17.8 MG/L (ref 0–8)
ERYTHROCYTE [DISTWIDTH] IN BLOOD BY AUTOMATED COUNT: 12.8 % (ref 10–15)
ERYTHROCYTE [SEDIMENTATION RATE] IN BLOOD BY WESTERGREN METHOD: 28 MM/HR (ref 0–20)
GFR SERPL CREATININE-BSD FRML MDRD: >90 ML/MIN/1.73M2
HCT VFR BLD AUTO: 40.1 % (ref 35–47)
HGB BLD-MCNC: 13 G/DL (ref 11.7–15.7)
MCH RBC QN AUTO: 30.2 PG (ref 26.5–33)
MCHC RBC AUTO-ENTMCNC: 32.4 G/DL (ref 31.5–36.5)
MCV RBC AUTO: 93 FL (ref 78–100)
PLATELET # BLD AUTO: 399 10E3/UL (ref 150–450)
RBC # BLD AUTO: 4.31 10E6/UL (ref 3.8–5.2)
WBC # BLD AUTO: 10.9 10E3/UL (ref 4–11)

## 2021-11-29 PROCEDURE — 85027 COMPLETE CBC AUTOMATED: CPT

## 2021-11-29 PROCEDURE — 82040 ASSAY OF SERUM ALBUMIN: CPT

## 2021-11-29 PROCEDURE — 86140 C-REACTIVE PROTEIN: CPT

## 2021-11-29 PROCEDURE — 84450 TRANSFERASE (AST) (SGOT): CPT

## 2021-11-29 PROCEDURE — 85652 RBC SED RATE AUTOMATED: CPT

## 2021-11-29 PROCEDURE — 99000 SPECIMEN HANDLING OFFICE-LAB: CPT

## 2021-11-29 PROCEDURE — 36415 COLL VENOUS BLD VENIPUNCTURE: CPT

## 2021-11-29 PROCEDURE — 84460 ALANINE AMINO (ALT) (SGPT): CPT

## 2021-11-29 PROCEDURE — 82955 ASSAY OF G6PD ENZYME: CPT | Mod: 90

## 2021-11-29 PROCEDURE — 82565 ASSAY OF CREATININE: CPT

## 2021-12-01 LAB — G6PD RBC-CCNT: 15 U/G HB

## 2021-12-14 ENCOUNTER — TELEPHONE (OUTPATIENT)
Dept: RHEUMATOLOGY | Facility: CLINIC | Age: 36
End: 2021-12-14
Payer: COMMERCIAL

## 2021-12-14 NOTE — TELEPHONE ENCOUNTER
PA Initiation    Medication: OTEZLA  Insurance Company: [employer] - Phone [phone] Fax [phone]  Pharmacy Filling the Rx: [facility] - [place], [place] - [facility] [address]  Filling Pharmacy Phone: [phone]  Filling Pharmacy Fax: [phone]  Start Date: 12/14/2021

## 2021-12-22 NOTE — RESULT ENCOUNTER NOTE
Blood work showed elevated ESR, CRP.  CRP has improved as compared to before.  Kidney test, CBC and liver tests are normal.

## 2022-01-04 ENCOUNTER — LAB (OUTPATIENT)
Dept: LAB | Facility: CLINIC | Age: 37
End: 2022-01-04
Payer: COMMERCIAL

## 2022-01-04 DIAGNOSIS — L40.50 PSORIATIC ARTHRITIS (H): ICD-10-CM

## 2022-01-04 LAB
ALBUMIN SERPL-MCNC: 3.5 G/DL (ref 3.4–5)
ALT SERPL W P-5'-P-CCNC: 25 U/L (ref 0–50)
AST SERPL W P-5'-P-CCNC: 9 U/L (ref 0–45)
CREAT SERPL-MCNC: 0.79 MG/DL (ref 0.52–1.04)
CRP SERPL-MCNC: 37.2 MG/L (ref 0–8)
ERYTHROCYTE [DISTWIDTH] IN BLOOD BY AUTOMATED COUNT: 13.5 % (ref 10–15)
ERYTHROCYTE [SEDIMENTATION RATE] IN BLOOD BY WESTERGREN METHOD: 19 MM/HR (ref 0–20)
GFR SERPL CREATININE-BSD FRML MDRD: >90 ML/MIN/1.73M2
HCT VFR BLD AUTO: 41.9 % (ref 35–47)
HGB BLD-MCNC: 13.6 G/DL (ref 11.7–15.7)
MCH RBC QN AUTO: 30.1 PG (ref 26.5–33)
MCHC RBC AUTO-ENTMCNC: 32.5 G/DL (ref 31.5–36.5)
MCV RBC AUTO: 93 FL (ref 78–100)
PLATELET # BLD AUTO: 392 10E3/UL (ref 150–450)
RBC # BLD AUTO: 4.52 10E6/UL (ref 3.8–5.2)
WBC # BLD AUTO: 11.3 10E3/UL (ref 4–11)

## 2022-01-04 PROCEDURE — 86140 C-REACTIVE PROTEIN: CPT

## 2022-01-04 PROCEDURE — 85027 COMPLETE CBC AUTOMATED: CPT

## 2022-01-04 PROCEDURE — 84460 ALANINE AMINO (ALT) (SGPT): CPT

## 2022-01-04 PROCEDURE — 82040 ASSAY OF SERUM ALBUMIN: CPT

## 2022-01-04 PROCEDURE — 82565 ASSAY OF CREATININE: CPT

## 2022-01-04 PROCEDURE — 84450 TRANSFERASE (AST) (SGOT): CPT

## 2022-01-04 PROCEDURE — 85652 RBC SED RATE AUTOMATED: CPT

## 2022-01-04 PROCEDURE — 36415 COLL VENOUS BLD VENIPUNCTURE: CPT

## 2022-01-22 ENCOUNTER — HEALTH MAINTENANCE LETTER (OUTPATIENT)
Age: 37
End: 2022-01-22

## 2022-02-10 NOTE — PROGRESS NOTES
Alicia is a 36 year old who is being evaluated via a billable video visit.      How would you like to obtain your AVS? MyChart  If the video visit is dropped, the invitation should be resent by: Text to cell phone: [phone]  Will anyone else be joining your video visit? No      Video Start Time: 1:56 PM    Rheumatology Visit     Alicia Smith MRN# [id number]   YOB: 1985 Age: 35 year old     Date of Visit: Mar 1, 2022   Primary care provider: Rosa Bentley          Assessment and Plan:     Assessment     Psoriatic arthritis  Psoriasis-scalp, ears  Dactylitis-third, fourth right toe  Plantar fasciitis  Morbid obesity  Lumbar degenerative disc disease    Ms. Smith is 36 year old female evaluated via billable virtual visit for management of PsA.     Psoriatic arthritis: Pain in her joints started in 2020 in her right leg. Pain radiated from right groin into the thigh.  MRI lumbar spine showed mild DDD. She had bilateral hip injection which helped for a week. In 4/2021 she noticed sausage like swelling of her right third, fourth toes.  She had pain and tenderness over left wrist, right ankle and right knee and swelling over her wrist and ankles.  Also had numbness in the left thumb, index and middle finger. She has hx of Psoriasis.     Her clinical history, physical exam is consistent with psoriatic arthritis.  She has dactylitis, enthesitis in the form of plantar fasciitis.  She has synovitis in left wrist, bilateral ankles.    She has negative rheumatoid factor, anti-CCP, HLA-B27 genotype.  X-ray of bilateral hand, wrist, feet showed no erosive disease. She was given prednisone taper and responded very well.  Otezla was started in 8/2021 for psoriatic arthritis.  Denies any side effects.  It cleared psoriasis, it is 70% better.  SSZ was added due to persistent joint pains in 1/2022 but developed skin rash due to it. Later Humira was started. She has been on it now for a month and has noticed  improvement.     Will continue Humira 40 mg subcutaneous once every 2 weeks.     Psoriasis: Psoriasis is better on Otezla and Humira.     Lumbar degenerative disc disease: She also has lumbar degenerative disc disease.  Reports chronic low back pain and follows with orthopedic.    Morbid obesity: She is morbidly obese.  Once overall inflammation is under control she should focus on weight loss, physical exercise, pool therapy which will help her long-term.    Vaccinations: Vaccinations reviewed with Ms. Smith. Risks and benefits of vaccinations were discussed.  - Influenza: encouraged yearly vaccination  - Asrbizy10: prescription sent  - [id number]: to receive 8 weeks after [id number] is administered  - Zostavax: after age 50  - COVID : 12/21/21, 2/5/21, 1/15/21      Plan    Continue Humira 40 mg subcu every 2 weeks    Continue Otezla 30 mg twice daily    Follow-up in 3 months with labs. May 9th                 Active Problem List:     Patient Active Problem List    Diagnosis Date Noted     Morbid obesity (H) 07/08/2021     Priority: Medium            History of Present Illness:   Alicia Smith is a 36 year old female with PMH of psoriasis, morbid obesity, lumbar degenerative disc disease evaluated by billable virtual visit for management of PsA.     Her pain started a year ago in the right leg.  She has history of herniated disc due to lumbar degenerative disc disease since 2016 and initially attributed the right leg pain to that.  She was seen by spine specialist and had MRI of the SI joint which again showed lumbar degenerative disc disease.  She did 16 weeks of physical therapy but did not notice any improvement. Pain in the right leg and thigh worsened.  Later it progressed to her left leg.  She had bilateral hip joint injection which worked only for a week.  Gradually  pain spread to other areas including left wrist, fingers, bilateral ankles, feet, both shoulders.  She has noticed swelling over her joints  specially right knee, ankles, left wrist.  In April she developed sausagelike swelling of right third and fourth toes.  She has history of chronic plantar fasciitis off and on for 10 years.  Denies any pain in her heels or elbow tendinitis.  She has history of IBS but denies any bloody stools, colitis.  No history of uveitis.  Denies any family history of psoriasis, inflammatory bowel disease or other autoimmune connective tissue disease.    She had psoriasis for 2 to 3 years, mainly on her scalp and ear canals.    She takes 1000 MG Tyenol and 600 mg ibuprofen 2 - 3 times a day. It makes it manageable.     Denies any raynauds, malar rash, photosensitivity, recurrent mouth/genital ulcers, sicca symptoms, pleuritic chest pains, recurrent sinusitis/rhinitis, swallowing difficulty, hearing or visual changes recently. No h/o arterial/venous thrombosis in the past. Denies any tick bite, recent GI/ infection.     August 9, 2021 - Autoimmune work-up showed elevated ESR, CRP, negative rheumatoid factor, anti-CCP, HLA-B27 negative.  X-rays of bilateral hand, wrist and feet did not show any evidence of erosive disease.  SI joint x-ray showed no sacroiliitis.  She was given prednisone taper to help with inflammatory arthritis.  She started on 20 mg and noticed improvement.  She is on 10 mg dose now.  Her clinical history, physical exam is suggestive of psoriatic arthritis.  She reports history of chronic plantar fasciitis, dactylitis and has migratory joint pain and swelling.  She started Otezla for psoriatic arthritis couple weeks ago and has noticed mild improvement, denies any side effects with Otezla use.  Her toes are still swollen but slightly better than before.      November 23, 2021 - She took prednisone taper after her last visit and did notice improvement in her joint pains.  Otezla was started couple months ago.  She has noticed improvement with psoriasis, to 70% better.  She still has joint pains.  Some days are  worse than the other.  Main pain is in her hands, foot, knees and ankles.  Right middle finger is still swollen.    March 1, 2022 - She started Humira a month ago. Her psoriasis is getting better. She is also on Otezla. Right middle finger is getting better. She still has swollen toes and pain in her feet.  Denies any side effects with Humira or Otezla.         Review of Systems:     Review Of Systems  Constitutional: denies fever, chills, night sweats and weight loss.  Skin: + skin rash.  Eyes: No dryness or irritation in eyes. No episode of eye inflammation or redness.   Ears/Nose/Throat: no recurrent sinus infections.  Respiratory: No shortness of breath, dyspnea on exertion, cough, or hemoptysis  Cardiovascular: no chest pain or palpitations.  Gastrointestinal: no nausea, vomiting, abdominal pain.  Normal bowel movements.  Genitourinary: no dysuria, frequency  or hematuria.  Musculoskeletal: as in HPI  Neurologic: + numbness, tingling.  Psychiatric: no mood disorders.  Hematologic/Lymphatic/Immunologic: no history of easy bruising, petechia or purpura.  No abnormal bleeding.   Endocrine: no h/o thyroid disease or Diabetes.                  Past Medical History:     Past Medical History:   Diagnosis Date     Acute pancreatitis 2016     GERD (gastroesophageal reflux disease)      Infective otitis externa, right 08/29/2018     Irritable bowel syndrome      Lumbar radiculopathy      Psoriasis      Vitamin D deficiency      Past Surgical History:   Procedure Laterality Date     LASIK Bilateral 2016            Social History:     Social History     Occupational History     Not on file   Tobacco Use     Smoking status: Never Smoker     Smokeless tobacco: Never Used   Substance and Sexual Activity     Alcohol use: Yes     Comment: rarely     Drug use: No     Sexual activity: Yes            Family History:     Family History   Problem Relation Age of Onset     Hypertension Other      Hyperlipidemia Mother      Diabetes  Type 2  Mother      Obesity Father      Hypertension Father      Diabetes Type 2  Brother      Obesity Brother             Allergies:   No Known Allergies         Medications:     Current Outpatient Medications   Medication Sig Dispense Refill     acetaminophen (TYLENOL) 500 MG tablet        adalimumab (HUMIRA *CF*) 40 MG/0.4ML pen kit Inject 0.4 mLs (40 mg) Subcutaneous every 14 days Hold for signs of infection, then seek medical attention. 1 each 5     apremilast (OTEZLA) 30 MG tablet Take 1 tablet (30 mg) by mouth 2 times daily Hold for signs of infection, and seek medical attention. 60 tablet 5     Biotin w/ Vitamins C & E 1250-7.5-7.5 MCG-MG-UNT CHEW        Cholecalciferol (VITAMIN D) 2000 units tablet Take 1 tablet by mouth daily       clobetasol propionate (OLUX) 0.05 % external foam Apply foam to the scalp twice daily for up to 2 weeks.       ibuprofen (ADVIL/MOTRIN) 200 MG tablet        Loratadine (CLARITIN PO) Take  by mouth.       multivitamin w/minerals (MULTIVITAMIN, THERAPEUTIC WITH MINERALS) tablet        sulfaSALAzine (AZULFIDINE) 500 MG tablet Start Sulfasalazine 500 mg twice daily and increase by 1 tab every week to reach at 2 tab twice a day dose. 120 tablet 1            Physical Exam:   There were no vitals taken for this visit.  Wt Readings from Last 4 Encounters:   07/08/21 (!) 150.3 kg (331 lb 4.8 oz)   05/25/21 (!) 151 kg (333 lb)   04/21/21 147.5 kg (325 lb 3.2 oz)   11/30/20 146.3 kg (322 lb 8 oz)       Constitutional: Morbidly obese, appearing stated age; cooperative    MS: Tenderness over left wrist has resolved.  Swelling over right middle finger decreased.  No tenderness reported over MCP, PIP, DIP joints.  No tenderness reported over bilateral ankles, knees, MTP joints.  She still has dactylitis over few toes.     No SI joint tenderness present.  Range of motion of right hip is limited due to pain.            Data:     Outside studies reviewed: [facility] from [facility] hematology visit  noted.  Negative MANUELA, rheumatoid factor, Lyme serology    Reviewed Rheumatology lab flowsheet    Benita Alejandro MD  [facility]  Department of Rheumatology & Autoimmune Disorders  [facility]: [phone]   Pager - [phone]    Video-Visit Details    Type of service:  Video Visit    Video End Time: 2:10 PM    Originating Location (pt. Location): Home    Distant Location (provider location):  [facility]     Platform used for Video Visit: [employer]

## 2022-03-01 ENCOUNTER — VIRTUAL VISIT (OUTPATIENT)
Dept: RHEUMATOLOGY | Facility: CLINIC | Age: 37
End: 2022-03-01
Payer: COMMERCIAL

## 2022-03-01 DIAGNOSIS — Z79.899 HIGH RISK MEDICATION USE: ICD-10-CM

## 2022-03-01 DIAGNOSIS — L40.50 PSORIATIC ARTHRITIS (H): Primary | ICD-10-CM

## 2022-03-01 PROCEDURE — 99214 OFFICE O/P EST MOD 30 MIN: CPT | Mod: 95 | Performed by: STUDENT IN AN ORGANIZED HEALTH CARE EDUCATION/TRAINING PROGRAM

## 2022-03-01 NOTE — RESULT ENCOUNTER NOTE
Results discussed with the patient at the time of visit.     Benita Alejandro MD   3/1/2022  1:58 PM

## 2022-03-01 NOTE — PATIENT INSTRUCTIONS
Continue Humira 40 mg subcu every 2 weeks    Continue Otezla 30 mg twice daily    Follow-up in 3 months with labs. May 9th

## 2022-03-07 DIAGNOSIS — L40.50 PSORIATIC ARTHRITIS (H): ICD-10-CM

## 2022-03-09 RX ORDER — APREMILAST 30 MG/1
30 TABLET, FILM COATED ORAL 2 TIMES DAILY
Qty: 180 TABLET | Refills: 3 | Status: SHIPPED | OUTPATIENT
Start: 2022-03-09 | End: 2022-11-07

## 2022-03-09 NOTE — TELEPHONE ENCOUNTER
Last Clinic Visit: Rheumatology  3/1/2022  [facility] [place]  Recommended 3 month follow up  [place] 7/5/22

## 2022-03-19 ENCOUNTER — HEALTH MAINTENANCE LETTER (OUTPATIENT)
Age: 37
End: 2022-03-19

## 2022-06-16 NOTE — PROGRESS NOTES
Alicia is a 36 year old who is being evaluated via a billable video visit.      How would you like to obtain your AVS? MyChart  If the video visit is dropped, the invitation should be resent by: Text to cell phone: [phone]  Will anyone else be joining your video visit? No       Virtual visit start time : 10:40 AM     Rheumatology Visit     Alicia Smith MRN# [id number]   YOB: 1985 Age: 35 year old     Date of Visit: Jul 5, 2022   Primary care provider: Rosa Bentley          Assessment and Plan:     Assessment     Psoriatic arthritis  Psoriasis-scalp, ears  Dactylitis-third, fourth right toe  Plantar fasciitis  Morbid obesity  Lumbar degenerative disc disease    Ms. Smith is 36 year old female evaluated via billable virtual visit for management of PsA.     Psoriatic arthritis: Pain in her joints started in 2020 in her right leg. Pain radiated from right groin into the thigh.  MRI lumbar spine showed mild DDD. She had bilateral hip injection which helped for a week. In 4/2021 she noticed sausage like swelling of her right third, fourth toes.  She had pain and tenderness over left wrist, right ankle and right knee and swelling over her wrist and ankles.  Also had numbness in the left thumb, index and middle finger. She has hx of Psoriasis.     Her clinical history, physical exam is consistent with psoriatic arthritis.  She has dactylitis, enthesitis in the form of plantar fasciitis.  She has synovitis in left wrist, bilateral ankles.    She has negative rheumatoid factor, anti-CCP, HLA-B27 genotype.  X-ray of bilateral hand, wrist, feet showed no erosive disease. She was given prednisone taper and responded very well.  Otezla was started in 8/2021 for psoriatic arthritis.  Denies any side effects.  It cleared psoriasis, it is 70% better.  SSZ was added due to persistent joint pains in 1/2022 but developed skin rash due to it. Later Humira was started in 2/2022. She has noted marked improvement on  Humira.      Will continue Humira 40 mg subcutaneous once every 2 weeks.     Psoriasis: Psoriasis is better on Otezla and Humira.     Lumbar degenerative disc disease: She also has lumbar degenerative disc disease.  Reports chronic low back pain and follows with orthopedic.    Right knee Osteoarthritis : She has OA in her right knee and is seeing [facility] for it. She received intra articular steroid injection in her right knee in 2/2022 which helped.     Morbid obesity: She is morbidly obese.  Once overall inflammation is under control she should focus on weight loss, physical exercise, pool therapy which will help her long-term.    Vaccinations: Vaccinations reviewed with Ms. Smith. Risks and benefits of vaccinations were discussed.  - Influenza: encouraged yearly vaccination  - Iubkoxe89: prescription sent  - Hlaasiqso39: to receive 8 weeks after [id number] is administered  - Zostavax: after age 50  - COVID : 12/21/21, 2/5/21, 1/15/21      Plan    Continue Humira 40 mg subcu every 2 weeks    Continue Otezla 30 mg twice daily    Follow-up in 3 months in [place] with labs                Active Problem List:     Patient Active Problem List    Diagnosis Date Noted     Morbid obesity (H) 07/08/2021     Priority: Medium            History of Present Illness:   Alicia Smith is a 36 year old female with PMH of psoriasis, morbid obesity, lumbar degenerative disc disease evaluated by billable virtual visit for management of PsA.     Her pain started a year ago in the right leg.  She has history of herniated disc due to lumbar degenerative disc disease since 2016 and initially attributed the right leg pain to that.  She was seen by spine specialist and had MRI of the SI joint which again showed lumbar degenerative disc disease.  She did 16 weeks of physical therapy but did not notice any improvement. Pain in the right leg and thigh worsened.  Later it progressed to her left leg.  She had bilateral hip joint  injection which worked only for a week.  Gradually  pain spread to other areas including left wrist, fingers, bilateral ankles, feet, both shoulders.  She has noticed swelling over her joints specially right knee, ankles, left wrist.  In April she developed sausagelike swelling of right third and fourth toes.  She has history of chronic plantar fasciitis off and on for 10 years.  Denies any pain in her heels or elbow tendinitis.  She has history of IBS but denies any bloody stools, colitis.  No history of uveitis.  Denies any family history of psoriasis, inflammatory bowel disease or other autoimmune connective tissue disease.    She had psoriasis for 2 to 3 years, mainly on her scalp and ear canals.    She takes 1000 MG Tyenol and 600 mg ibuprofen 2 - 3 times a day. It makes it manageable.     Denies any raynauds, malar rash, photosensitivity, recurrent mouth/genital ulcers, sicca symptoms, pleuritic chest pains, recurrent sinusitis/rhinitis, swallowing difficulty, hearing or visual changes recently. No h/o arterial/venous thrombosis in the past. Denies any tick bite, recent GI/ infection.     August 9, 2021 - Autoimmune work-up showed elevated ESR, CRP, negative rheumatoid factor, anti-CCP, HLA-B27 negative.  X-rays of bilateral hand, wrist and feet did not show any evidence of erosive disease.  SI joint x-ray showed no sacroiliitis.  She was given prednisone taper to help with inflammatory arthritis.  She started on 20 mg and noticed improvement.  She is on 10 mg dose now.  Her clinical history, physical exam is suggestive of psoriatic arthritis.  She reports history of chronic plantar fasciitis, dactylitis and has migratory joint pain and swelling.  She started Otezla for psoriatic arthritis couple weeks ago and has noticed mild improvement, denies any side effects with Otezla use.  Her toes are still swollen but slightly better than before.      November 23, 2021 - She took prednisone taper after her last  visit and did notice improvement in her joint pains.  Otezla was started couple months ago.  She has noticed improvement with psoriasis, to 70% better.  She still has joint pains.  Some days are worse than the other.  Main pain is in her hands, foot, knees and ankles.  Right middle finger is still swollen.    March 1, 2022 - She started Humira a month ago. Her psoriasis is getting better. She is also on Otezla. Right middle finger is getting better. She still has swollen toes and pain in her feet.  Denies any side effects with Humira or Otezla.    July 5, 2022 - She is doing well on combination of Otezla and Humira. Swelling has been going down in her hands and feet. She still has swelling over Right foot 3,4th toe. No pain in her joints. She has pain in her knees and is seeing [facility]. Sometimes her knees swell and ache. She had right knee injection in 3/2022 which helped. Recent labs done on 6/30/2022 showed ESR 26, CRP-13.9, elevated AST-48, normal ALT, normal creatinine.  She denies any recent flare.  She does not take a lot of Tylenol or drink alcohol.         Review of Systems:     Review Of Systems  Constitutional: denies fever, chills, night sweats and weight loss.  Skin: + skin rash.  Eyes: No dryness or irritation in eyes. No episode of eye inflammation or redness.   Ears/Nose/Throat: no recurrent sinus infections.  Respiratory: No shortness of breath, dyspnea on exertion, cough, or hemoptysis  Cardiovascular: no chest pain or palpitations.  Gastrointestinal: no nausea, vomiting, abdominal pain.  Normal bowel movements.  Genitourinary: no dysuria, frequency  or hematuria.  Musculoskeletal: as in HPI  Neurologic: + numbness, tingling.  Psychiatric: no mood disorders.  Hematologic/Lymphatic/Immunologic: no history of easy bruising, petechia or purpura.  No abnormal bleeding.   Endocrine: no h/o thyroid disease or Diabetes.                  Past Medical History:     Past Medical History:    Diagnosis Date     Acute pancreatitis 2016     GERD (gastroesophageal reflux disease)      Infective otitis externa, right 08/29/2018     Irritable bowel syndrome      Lumbar radiculopathy      Psoriasis      Vitamin D deficiency      Past Surgical History:   Procedure Laterality Date     LASIK Bilateral 2016            Social History:     Social History     Occupational History     Not on file   Tobacco Use     Smoking status: Never Smoker     Smokeless tobacco: Never Used   Substance and Sexual Activity     Alcohol use: Yes     Comment: rarely     Drug use: No     Sexual activity: Yes            Family History:     Family History   Problem Relation Age of Onset     Hypertension Other      Hyperlipidemia Mother      Diabetes Type 2  Mother      Obesity Father      Hypertension Father      Diabetes Type 2  Brother      Obesity Brother             Allergies:     Allergies   Allergen Reactions     Sulfasalazine Rash            Medications:     Current Outpatient Medications   Medication Sig Dispense Refill     acetaminophen (TYLENOL) 500 MG tablet        adalimumab (HUMIRA *CF*) 40 MG/0.4ML pen kit Inject 0.4 mLs (40 mg) Subcutaneous every 14 days Hold for signs of infection, then seek medical attention. 1 each 5     apremilast (OTEZLA) 30 MG tablet Take 1 tablet (30 mg) by mouth 2 times daily Hold for signs of infection, and seek medical attention 180 tablet 3     Biotin w/ Vitamins C & E 1250-7.5-7.5 MCG-MG-UNT CHEW        Cholecalciferol (VITAMIN D) 2000 units tablet Take 1 tablet by mouth daily       ibuprofen (ADVIL/MOTRIN) 200 MG tablet        Loratadine (CLARITIN PO) Take  by mouth.       multivitamin w/minerals (THERA-VIT-M) tablet        clobetasol propionate (OLUX) 0.05 % external foam Apply foam to the scalp twice daily for up to 2 weeks.              Physical Exam:   There were no vitals taken for this visit.  Wt Readings from Last 4 Encounters:   07/08/21 (!) 150.3 kg (331 lb 4.8 oz)   05/25/21 (!) 151  kg (333 lb)   04/21/21 147.5 kg (325 lb 3.2 oz)   11/30/20 146.3 kg (322 lb 8 oz)       Constitutional: Morbidly obese, appearing stated age; cooperative    MS: Tenderness over left wrist has resolved.  Swelling over right middle finger decreased.  No tenderness reported over MCP, PIP, DIP joints.  No tenderness reported over bilateral ankles, knees, MTP joints.  She still has dactylitis over right 3,4th toes but not tender.    No SI joint tenderness present.  Range of motion of right hip is limited due to pain.            Data:     Outside studies reviewed: Records from [facility] hematology visit noted.  Negative MANUELA, rheumatoid factor, Lyme serology    Reviewed Rheumatology lab flowsheet    Benita Alejandro MD  [facility]  Department of Rheumatology & Autoimmune Disorders  [facility]: [phone]   Pager - [phone]    Video-Visit Details    Type of service:  Video Visit    Video End Time:  11:00 AM    Originating Location (pt. Location): Home    Distant Location (provider location):  [facility]     Platform used for Video Visit: [employer]

## 2022-06-30 ENCOUNTER — LAB (OUTPATIENT)
Dept: LAB | Facility: CLINIC | Age: 37
End: 2022-06-30
Payer: COMMERCIAL

## 2022-06-30 DIAGNOSIS — Z79.899 HIGH RISK MEDICATION USE: ICD-10-CM

## 2022-06-30 DIAGNOSIS — L40.50 PSORIATIC ARTHRITIS (H): ICD-10-CM

## 2022-06-30 LAB
ALBUMIN SERPL-MCNC: 3.9 G/DL (ref 3.4–5)
ALT SERPL W P-5'-P-CCNC: 39 U/L (ref 0–50)
AST SERPL W P-5'-P-CCNC: 48 U/L (ref 0–45)
CREAT SERPL-MCNC: 0.83 MG/DL (ref 0.52–1.04)
CRP SERPL-MCNC: 30.9 MG/L (ref 0–8)
ERYTHROCYTE [DISTWIDTH] IN BLOOD BY AUTOMATED COUNT: 12.9 % (ref 10–15)
ERYTHROCYTE [SEDIMENTATION RATE] IN BLOOD BY WESTERGREN METHOD: 26 MM/HR (ref 0–20)
GFR SERPL CREATININE-BSD FRML MDRD: >90 ML/MIN/1.73M2
HCT VFR BLD AUTO: 38.1 % (ref 35–47)
HGB BLD-MCNC: 13 G/DL (ref 11.7–15.7)
MCH RBC QN AUTO: 31.2 PG (ref 26.5–33)
MCHC RBC AUTO-ENTMCNC: 34.1 G/DL (ref 31.5–36.5)
MCV RBC AUTO: 91 FL (ref 78–100)
PLATELET # BLD AUTO: 369 10E3/UL (ref 150–450)
RBC # BLD AUTO: 4.17 10E6/UL (ref 3.8–5.2)
WBC # BLD AUTO: 11.2 10E3/UL (ref 4–11)

## 2022-06-30 PROCEDURE — 36415 COLL VENOUS BLD VENIPUNCTURE: CPT

## 2022-06-30 PROCEDURE — 85027 COMPLETE CBC AUTOMATED: CPT

## 2022-06-30 PROCEDURE — 84460 ALANINE AMINO (ALT) (SGPT): CPT

## 2022-06-30 PROCEDURE — 86140 C-REACTIVE PROTEIN: CPT

## 2022-06-30 PROCEDURE — 82565 ASSAY OF CREATININE: CPT

## 2022-06-30 PROCEDURE — 85652 RBC SED RATE AUTOMATED: CPT

## 2022-06-30 PROCEDURE — 84450 TRANSFERASE (AST) (SGOT): CPT

## 2022-06-30 PROCEDURE — 82040 ASSAY OF SERUM ALBUMIN: CPT

## 2022-07-05 ENCOUNTER — VIRTUAL VISIT (OUTPATIENT)
Dept: RHEUMATOLOGY | Facility: CLINIC | Age: 37
End: 2022-07-05
Payer: COMMERCIAL

## 2022-07-05 DIAGNOSIS — L40.50 PSORIATIC ARTHRITIS (H): ICD-10-CM

## 2022-07-05 DIAGNOSIS — Z79.899 HIGH RISK MEDICATION USE: ICD-10-CM

## 2022-07-05 DIAGNOSIS — L40.50 PSORIATIC ARTHRITIS (H): Primary | ICD-10-CM

## 2022-07-05 PROCEDURE — 99214 OFFICE O/P EST MOD 30 MIN: CPT | Mod: 95 | Performed by: STUDENT IN AN ORGANIZED HEALTH CARE EDUCATION/TRAINING PROGRAM

## 2022-07-05 NOTE — RESULT ENCOUNTER NOTE
Results discussed with the patient at the time of visit.     Benita Alejandro MD   7/5/2022  10:43 AM

## 2022-07-05 NOTE — PATIENT INSTRUCTIONS
Continue Humira 40 mg subcu every other week    Continue Otezla    Follow-up in 3 months in [place] with labs

## 2022-09-03 ENCOUNTER — HEALTH MAINTENANCE LETTER (OUTPATIENT)
Age: 37
End: 2022-09-03

## 2022-11-07 ENCOUNTER — LAB (OUTPATIENT)
Dept: LAB | Facility: CLINIC | Age: 37
End: 2022-11-07
Payer: COMMERCIAL

## 2022-11-07 ENCOUNTER — OFFICE VISIT (OUTPATIENT)
Dept: RHEUMATOLOGY | Facility: CLINIC | Age: 37
End: 2022-11-07
Payer: COMMERCIAL

## 2022-11-07 VITALS
TEMPERATURE: 98.2 F | DIASTOLIC BLOOD PRESSURE: 96 MMHG | WEIGHT: 293 LBS | SYSTOLIC BLOOD PRESSURE: 147 MMHG | BODY MASS INDEX: 56.98 KG/M2 | HEART RATE: 71 BPM | OXYGEN SATURATION: 100 %

## 2022-11-07 DIAGNOSIS — L40.50 PSORIATIC ARTHRITIS (H): ICD-10-CM

## 2022-11-07 DIAGNOSIS — M51.369 DDD (DEGENERATIVE DISC DISEASE), LUMBAR: ICD-10-CM

## 2022-11-07 DIAGNOSIS — Z79.899 HIGH RISK MEDICATION USE: ICD-10-CM

## 2022-11-07 DIAGNOSIS — Z79.899 ENCOUNTER FOR LONG-TERM (CURRENT) USE OF HIGH-RISK MEDICATION: ICD-10-CM

## 2022-11-07 DIAGNOSIS — L40.9 PSORIASIS: ICD-10-CM

## 2022-11-07 DIAGNOSIS — M17.11 PRIMARY OSTEOARTHRITIS OF RIGHT KNEE: Primary | ICD-10-CM

## 2022-11-07 LAB
ALBUMIN SERPL-MCNC: 3.6 G/DL (ref 3.4–5)
ALT SERPL W P-5'-P-CCNC: 31 U/L (ref 0–50)
AST SERPL W P-5'-P-CCNC: 25 U/L (ref 0–45)
CREAT SERPL-MCNC: 0.73 MG/DL (ref 0.52–1.04)
CRP SERPL-MCNC: 18.8 MG/L (ref 0–8)
ERYTHROCYTE [DISTWIDTH] IN BLOOD BY AUTOMATED COUNT: 13.4 % (ref 10–15)
ERYTHROCYTE [SEDIMENTATION RATE] IN BLOOD BY WESTERGREN METHOD: 22 MM/HR (ref 0–20)
GFR SERPL CREATININE-BSD FRML MDRD: >90 ML/MIN/1.73M2
HCT VFR BLD AUTO: 43.7 % (ref 35–47)
HGB BLD-MCNC: 14.5 G/DL (ref 11.7–15.7)
MCH RBC QN AUTO: 30.6 PG (ref 26.5–33)
MCHC RBC AUTO-ENTMCNC: 33.2 G/DL (ref 31.5–36.5)
MCV RBC AUTO: 92 FL (ref 78–100)
PLATELET # BLD AUTO: 387 10E3/UL (ref 150–450)
RBC # BLD AUTO: 4.74 10E6/UL (ref 3.8–5.2)
WBC # BLD AUTO: 9.6 10E3/UL (ref 4–11)

## 2022-11-07 PROCEDURE — 86140 C-REACTIVE PROTEIN: CPT

## 2022-11-07 PROCEDURE — 36415 COLL VENOUS BLD VENIPUNCTURE: CPT

## 2022-11-07 PROCEDURE — 82040 ASSAY OF SERUM ALBUMIN: CPT

## 2022-11-07 PROCEDURE — 85027 COMPLETE CBC AUTOMATED: CPT

## 2022-11-07 PROCEDURE — 84450 TRANSFERASE (AST) (SGOT): CPT

## 2022-11-07 PROCEDURE — 84460 ALANINE AMINO (ALT) (SGPT): CPT

## 2022-11-07 PROCEDURE — 82565 ASSAY OF CREATININE: CPT

## 2022-11-07 PROCEDURE — 99204 OFFICE O/P NEW MOD 45 MIN: CPT | Performed by: NURSE PRACTITIONER

## 2022-11-07 PROCEDURE — 85652 RBC SED RATE AUTOMATED: CPT

## 2022-11-07 RX ORDER — APREMILAST 30 MG/1
30 TABLET, FILM COATED ORAL 2 TIMES DAILY
Qty: 180 TABLET | Refills: 3 | Status: SHIPPED | OUTPATIENT
Start: 2022-11-07 | End: 2023-08-10

## 2022-11-07 NOTE — PATIENT INSTRUCTIONS
-Continue Current treatment: Humira 40 mg every 2 weeks,  Otezla 30 mg twice a day    -Labs every 3-4 months    -See me in 4 months

## 2022-11-07 NOTE — NURSING NOTE
"Alicia Smith's goals for this visit include:   Chief Complaint   Patient presents with     Consult     Psoriatic arthritis      New Patient     Patient of Javon       PCP: Rosa Bentley    Referring Provider:  No referring provider defined for this encounter.      Initial BP (!) 147/96 (BP Location: Left arm, Patient Position: Sitting, Cuff Size: Adult Large)   Pulse 71   Temp 98.2  F (36.8  C) (Oral)   Wt (!) 155.3 kg (342 lb 6.4 oz)   LMP 11/03/2022 (Approximate)   SpO2 100%   Breastfeeding No   BMI 56.98 kg/m   Estimated body mass index is 56.98 kg/m  as calculated from the following:    Height as of 7/8/21: 1.651 m (5' 5\").    Weight as of this encounter: 155.3 kg (342 lb 6.4 oz).    Medication Reconciliation: complete    Do you need any medication refills at today's visit? none    SUZETTE Banks  Rheumatology/Infectious disease  [facility]   [phone]    "

## 2022-11-07 NOTE — PROGRESS NOTES
"    Rheumatology Clinic Visit  Radha Gong, CNP      Alicia Smith  YOB: 1985    Age: 36 year old   MRN# [id number]       Date of Visit: 11/07/2022  Primary care provider: Rosa Bentley              Subjective:     Alicia is a 36 year old female presents today for transfer of care from Dr. Alejandro for PSA/PSO (dx 2020). Current treatment: Humira 40 mg every 2 weeks (2/22), Otezla 30 mg BID (8/21)      Today:   PSA: Doing ok, last 1-2 months not the best a little more joint pain. Is having intermittent swelling of L 1-3 fingers and R 2-4 toes.   OA: Knee pain is present, worse at end of day but over all not changing, no instability.   PSO: Improved but still active of ears  Wgt: Stable.    HPI:  Pt had thrombocytosis and neutrophilia and found to be likely related to inflammatory process, was having joint pain and rash and found to have PSA/PSO.    LOV 7/5/2022 with Dr. Alejandro:  \"Psoriatic arthritis: Pain in her joints started in 2020 in her right leg. Pain radiated from right groin into the thigh.  MRI lumbar spine showed mild DDD. She had bilateral hip injection which helped for a week. In 4/2021 she noticed sausage like swelling of her right third, fourth toes.  She had pain and tenderness over left wrist, right ankle and right knee and swelling over her wrist and ankles.  Also had numbness in the left thumb, index and middle finger. She has hx of Psoriasis.      Her clinical history, physical exam is consistent with psoriatic arthritis.  She has dactylitis, enthesitis in the form of plantar fasciitis.  She has synovitis in left wrist, bilateral ankles.     She has negative rheumatoid factor, anti-CCP, HLA-B27 genotype.  X-ray of bilateral hand, wrist, feet showed no erosive disease. She was given prednisone taper and responded very well.  Otezla was started in 8/2021 for psoriatic arthritis.  Denies any side effects.  It cleared psoriasis, it is 70% better.  SSZ was added due to " "persistent joint pains in 1/2022 but developed skin rash due to it. Later Humira was started in 2/2022. She has noted marked improvement on Humira.       Will continue Humira 40 mg subcutaneous once every 2 weeks.      Psoriasis: Psoriasis is better on Otezla and Humira.    Lumbar degenerative disc disease: She also has lumbar degenerative disc disease.  Reports chronic low back pain and follows with orthopedic.   Right knee Osteoarthritis : She has OA in her right knee and is seeing [facility] for it. She received intra articular steroid injection in her right knee in 2/2022 which helped.    Morbid obesity: She is morbidly obese.  Once overall inflammation is under control she should focus on weight loss, physical exercise, pool therapy which will help her long-term.\"    Thrombocytosis and neutrophilia work up 5/25/2021 LOV with Dr. Szymanski  \"I discussed with her that I suspect that the elevated white blood cell count is due to neutrophils and the mildly elevated platelet count is most likely reactive to some sort of inflammatory condition going on.  It is quite possible that this is connected to the joint pains that she is having.  I wonder whether she has a seronegative arthritis like psoriatic arthritis underlying.  We will know when she has the rheumatology work-up.     Meanwhile we will go for a work-up to make sure that she does not have an underlying myeloproliferative disorder that is responsible for the neutrophilia and thrombocytosis.  She was agreeable to doing a systematic work-up.  If an underlying hematological disorder is ruled out, then she need not worry about that again.   Today we will obtain the following labs: Ferritin, TIBC panel, BCR/ABL qualitative PCR, JAK2 mutation with reflex to MPL and PERNELL R mutations.   I discussed with her that once I have the results of the gene tests available, I will give her a call.  I asked her to expect a call in about 2 weeks time.\"      Vaccinations: " Vaccinations reviewed with Ms. Smith. Risks and benefits of vaccinations were discussed.  - Influenza: encouraged yearly vaccination. Has 2022  - [id number]: prescription sent  - [id number]: to receive 8 weeks after [id number] is administered  - Zostavax: after age 50  - COVID : 12/21/21, 2/5/21, 1/15/21, 10/11/2022    Past Rheum tx:  SSZ-rash    Social History  [place]  Nurse-Ortho-surg RN at [employer]   No tobacco  ETOH rare, [place]  No exercise  Read  [employer], travel   No children, not intending pg.     FMH:  NO AI dz.     Active Problem list:  Patient Active Problem List    Diagnosis Date Noted     Psoriatic arthritis (H) 11/07/2022     Priority: Medium     Psoriasis 11/07/2022     Priority: Medium     DDD (degenerative disc disease), lumbar 11/07/2022     Priority: Medium     Primary osteoarthritis of right knee 11/07/2022     Priority: Medium     Morbid obesity (H) 07/08/2021     Priority: Medium            Objective:     Physical Exam  Blood Pressure (Abnormal) 147/96 (BP Location: Left arm, Patient Position: Sitting, Cuff Size: Adult Large)   Pulse 71   Temperature 98.2  F (36.8  C) (Oral)   Weight (Abnormal) 155.3 kg (342 lb 6.4 oz)   Last Menstrual Period 11/03/2022 (Approximate)   Oxygen Saturation 100%   Breastfeeding No   Body Mass Index 56.98 kg/m    Body mass index is 56.98 kg/m .    Constitutional: lg body habitus with out gross deformities. Well groomed.   Psych: nl judgement, orientation, memory, affect.  Eyes: wnl EOM, PERRLA, vision, conjunctiva, sclera   ENT: wnl external ears, nose, hearing, lips, teeth, gums, throat. No mucous membrane lesions, normal saliva pool  Neck: no mass, thyroid enlargement, enlarged cervical lymph nodes or parotid glands  Resp: lungs clear to auscultation, nl work of breathing  CV: RRR, no murmurs, rubs or gallops, no edema  Skin: no nail pitting, alopecia, Behind right ear has quarter size indurated plaque of PSO and some in both ear  canals  Neuro: Strength WNL of bilateral upper and lower extremity large muscle groups.     MSK- (T=tender to palpation, S=swelling)  TMJ: -T/S, FROM   Cervical spine:  FROM  Shoulders: -T/S, FROM   Elbows: -T/S, FROM   Wrists: -T/S, FROM   Hands: -T/S, FROM, Normal  strength. No dactylitis. ? Subtle swelling R 3rd digit  Hips: FROM  Knees: -T/S, slow crepitus ROM, R>L   Ankles: -T/S, FROM No enthesopathy or tenosynovitis.   Feet: -T/S, FROM . No dactylitis.  L 4th toe subtle enlarged      Labs:    Lab Results   Component Value Date    ALT 31 11/07/2022    AST 25 11/07/2022    CR 0.73 11/07/2022    CRP 18.8 (H) 11/07/2022      Latest Reference Range & Units 11/07/22 10:24   Sed Rate 0 - 20 mm/hr 22 (H)       Imaging:            Assessment and Plan:     #1) PSA/PSO (dx 2020): Symptoms have improved but not resolved on current regimen, now on humira for 8 months. WBC and Plts now normal. CRP ESR improving but still elevated. Exam shows active PSO and questions to digits with active dz.  Consider adding methotrexate and stopping otezla if able. Pt has past elevated LFT's with suspected fatty liver as well as ETOH use increases during summer time cabin trips and vacations.  We will evaluate again in 4 months and determine if change to methotrexate at that time.     -Continue Current treatment: Humira 40 mg every 2 weeks (2/22), Otezla 30 mg BID (8/21)    -Labs every 3-4 months    -See me in 4 months    Pt states understanding and agreement to plan of care, has no further questions.     Radha Gong, CNP  Rheumatology, [facility]      54 minute spent on the date of the encounter doing chart review, history and exam, documentation and further activities per the note

## 2022-12-29 ENCOUNTER — TELEPHONE (OUTPATIENT)
Dept: RHEUMATOLOGY | Facility: CLINIC | Age: 37
End: 2022-12-29

## 2022-12-29 ENCOUNTER — OFFICE VISIT (OUTPATIENT)
Dept: URGENT CARE | Facility: URGENT CARE | Age: 37
End: 2022-12-29
Payer: COMMERCIAL

## 2022-12-29 VITALS
HEART RATE: 103 BPM | OXYGEN SATURATION: 100 % | DIASTOLIC BLOOD PRESSURE: 89 MMHG | BODY MASS INDEX: 55.98 KG/M2 | TEMPERATURE: 98.2 F | WEIGHT: 293 LBS | SYSTOLIC BLOOD PRESSURE: 136 MMHG

## 2022-12-29 DIAGNOSIS — R59.0 LEFT CERVICAL LYMPHADENOPATHY: Primary | ICD-10-CM

## 2022-12-29 DIAGNOSIS — R07.89 LEFT-SIDED CHEST WALL PAIN: ICD-10-CM

## 2022-12-29 DIAGNOSIS — L40.50 PSORIATIC ARTHRITIS (H): ICD-10-CM

## 2022-12-29 PROBLEM — K58.2 IRRITABLE BOWEL SYNDROME WITH BOTH CONSTIPATION AND DIARRHEA: Status: ACTIVE | Noted: 2022-12-29

## 2022-12-29 PROBLEM — J30.2 SEASONAL ALLERGIES: Status: ACTIVE | Noted: 2022-12-29

## 2022-12-29 PROBLEM — D75.839 THROMBOCYTOSIS: Status: ACTIVE | Noted: 2021-05-25

## 2022-12-29 PROBLEM — R74.02 NONSPECIFIC ELEVATION OF LEVELS OF TRANSAMINASE OR LACTIC ACID DEHYDROGENASE (LDH): Status: ACTIVE | Noted: 2022-12-29

## 2022-12-29 PROBLEM — E55.9 VITAMIN D DEFICIENCY: Status: ACTIVE | Noted: 2018-04-23

## 2022-12-29 PROBLEM — D72.828 NEUTROPHILIA: Status: ACTIVE | Noted: 2021-05-25

## 2022-12-29 PROBLEM — R74.01 NONSPECIFIC ELEVATION OF LEVELS OF TRANSAMINASE OR LACTIC ACID DEHYDROGENASE (LDH): Status: ACTIVE | Noted: 2022-12-29

## 2022-12-29 LAB
BASOPHILS # BLD AUTO: 0 10E3/UL (ref 0–0.2)
BASOPHILS NFR BLD AUTO: 0 %
EOSINOPHIL # BLD AUTO: 0.2 10E3/UL (ref 0–0.7)
EOSINOPHIL NFR BLD AUTO: 1 %
ERYTHROCYTE [DISTWIDTH] IN BLOOD BY AUTOMATED COUNT: 13.1 % (ref 10–15)
HCT VFR BLD AUTO: 41.2 % (ref 35–47)
HGB BLD-MCNC: 13.8 G/DL (ref 11.7–15.7)
IMM GRANULOCYTES # BLD: 0 10E3/UL
IMM GRANULOCYTES NFR BLD: 0 %
LYMPHOCYTES # BLD AUTO: 1.9 10E3/UL (ref 0.8–5.3)
LYMPHOCYTES NFR BLD AUTO: 16 %
MCH RBC QN AUTO: 30.4 PG (ref 26.5–33)
MCHC RBC AUTO-ENTMCNC: 33.5 G/DL (ref 31.5–36.5)
MCV RBC AUTO: 91 FL (ref 78–100)
MONOCYTES # BLD AUTO: 0.5 10E3/UL (ref 0–1.3)
MONOCYTES NFR BLD AUTO: 4 %
NEUTROPHILS # BLD AUTO: 9.4 10E3/UL (ref 1.6–8.3)
NEUTROPHILS NFR BLD AUTO: 79 %
PLATELET # BLD AUTO: 356 10E3/UL (ref 150–450)
RBC # BLD AUTO: 4.54 10E6/UL (ref 3.8–5.2)
WBC # BLD AUTO: 11.9 10E3/UL (ref 4–11)

## 2022-12-29 PROCEDURE — 93000 ELECTROCARDIOGRAM COMPLETE: CPT | Performed by: NURSE PRACTITIONER

## 2022-12-29 PROCEDURE — 99214 OFFICE O/P EST MOD 30 MIN: CPT | Performed by: NURSE PRACTITIONER

## 2022-12-29 PROCEDURE — 36415 COLL VENOUS BLD VENIPUNCTURE: CPT | Performed by: NURSE PRACTITIONER

## 2022-12-29 PROCEDURE — 85025 COMPLETE CBC W/AUTO DIFF WBC: CPT | Performed by: NURSE PRACTITIONER

## 2022-12-29 NOTE — TELEPHONE ENCOUNTER
Prior Authorization Approval    Authorization Effective Date: 12/28/2022  Authorization Expiration Date: 12/28/2023  Medication: OTEZLA 30 MG TABLET  Approved Dose/Quantity: 60 FOR 30 DAYS  Reference #:     Insurance Company: [employer] - Phone [phone] Fax [phone]  Expected CoPay:       CoPay Card Available:      Foundation Assistance Needed:    Which Pharmacy is filling the prescription (Not needed for infusion/clinic administered): [facility] - [place] [address]  Pharmacy Notified: Yes  Patient Notified: Yes

## 2022-12-30 NOTE — PROGRESS NOTES
Assessment & Plan     1. Left cervical lymphadenopathy    - EKG 12-lead complete w/read - Clinics  - CBC with platelets and differential    2. Left-sided chest wall pain    - EKG 12-lead complete w/read - Clinics  - CBC with platelets and differential    3. Psoriatic arthritis (H)    Discussed close monitoring of symptoms.  Mild elevation white count today and neutrophils on CBC with her history of psoriatic arthritis unclear etiology lymphadenopathy likely related to onset of viral or bacterial infection other differential diagnosis would include shingles, EBV.  Discussed with patient she will follow up with her rheumatologist as she is at increased risk of being on 2 DMARDs drugs.  She does have a standing order for CBC.  Did discuss red flag symptoms such as acute chest pain, fever malaise increased swelling of lymph node rash.  She states she does get psoriatic rash usually this will appear in inner ears.    Kylie Joseph, APRKAM [facility] [place]    Marco Antonio Vargas is a 37 year old female who presents to clinic today for the following health issues:  Chief Complaint   Patient presents with     Urgent Care     Neck Problem     Starting yesterday have neck pain-left side of neck, today notice redness and swelling      HPI    Patient states symptoms started yesterday with onset of left neck pain.  Pain is currently a 4/10.  She notes that she felt swelling under her chin on the left side area is slightly red and tender to touch she does not endorse warmth.  She states she is also having some left sided anterior wall pain pec pain feels somewhat numb and tingling.  Patient is taking ibuprofen for discomfort.   She does have a history of psoriatic arthritis she is taking Otezla and Humira for.  She follows a rheumatologist for this.    Review of Systems  Constitutional, HEENT, cardiovascular, pulmonary, gi and gu systems are negative, except as otherwise noted.      Objective     /89 (BP Location: Left arm, Patient Position: Sitting, Cuff Size: Adult Large)   Pulse 103   Temp 98.2  F (36.8  C) (Tympanic)   Wt (!) 152.6 kg (336 lb 6.4 oz)   LMP 11/03/2022 (Approximate)   SpO2 100%   BMI 55.98 kg/m    Physical Exam   GENERAL: healthy, alert and no distress  EYES: Eyes grossly normal to inspection, PERRL and conjunctivae and sclerae normal  HENT: normal cephalic/atraumatic, ear canals and TM's normal, nose and mouth without ulcers or lesions, oropharynx clear and oral mucous membranes moist  NECK: no adenopathy, no asymmetry, masses, or scars and thyroid normal to palpation  RESP: lungs clear to auscultation - no rales, rhonchi or wheezes  CV: regular rate and rhythm, normal S1 S2, no S3 or S4, no murmur, click or rub, no peripheral edema and peripheral pulses strong  ABDOMEN: soft, nontender, no hepatosplenomegaly, no masses and bowel sounds normal  MS: no gross musculoskeletal defects noted, no edema  SKIN: Mild erythema left anterior neck with some mild inflammation tenderness presumed to be  NEURO: Normal strength and tone, mentation intact and speech normal  BACK: no CVA tenderness, no paralumbar tenderness  LYMPH: anterior cervical: enlarged lymph nodes in the anterior cervical area.

## 2023-01-26 ENCOUNTER — TELEPHONE (OUTPATIENT)
Dept: RHEUMATOLOGY | Facility: CLINIC | Age: 38
End: 2023-01-26
Payer: COMMERCIAL

## 2023-01-26 NOTE — TELEPHONE ENCOUNTER
PA Initiation    Medication: Humira renewal  Insurance Company: [employer] - Phone [phone] Fax [phone]  Pharmacy Filling the Rx: [facility] - [place], [place] - [facility] [address]  Filling Pharmacy Phone:    Filling Pharmacy Fax:    Start Date: 1/26/2023    [id number]

## 2023-01-31 NOTE — TELEPHONE ENCOUNTER
Prior Authorization Approval    Authorization Effective Date:    Authorization Expiration Date: 1/26/2024  Medication: Humira renewal  Approved Dose/Quantity: q14d  Reference #: [id number]   Insurance Company: [employer] - Phone [phone] Fax [phone]  Expected CoPay:       CoPay Card Available:      Foundation Assistance Needed:    Which Pharmacy is filling the prescription (Not needed for infusion/clinic administered): [facility] - [place], [place] - [address]  Pharmacy Notified: Yes  Patient Notified: Yes

## 2023-02-10 ENCOUNTER — APPOINTMENT (OUTPATIENT)
Dept: CT IMAGING | Facility: HOSPITAL | Age: 38
End: 2023-02-10
Attending: EMERGENCY MEDICINE
Payer: COMMERCIAL

## 2023-02-10 ENCOUNTER — HOSPITAL ENCOUNTER (EMERGENCY)
Facility: HOSPITAL | Age: 38
Discharge: HOME | End: 2023-02-10
Attending: EMERGENCY MEDICINE | Admitting: EMERGENCY MEDICINE
Payer: COMMERCIAL

## 2023-02-10 VITALS
HEART RATE: 80 BPM | BODY MASS INDEX: 50.02 KG/M2 | SYSTOLIC BLOOD PRESSURE: 137 MMHG | OXYGEN SATURATION: 98 % | WEIGHT: 293 LBS | HEIGHT: 64 IN | TEMPERATURE: 98.2 F | RESPIRATION RATE: 15 BRPM | DIASTOLIC BLOOD PRESSURE: 92 MMHG

## 2023-02-10 DIAGNOSIS — R07.9 ACUTE CHEST PAIN: ICD-10-CM

## 2023-02-10 LAB
ALBUMIN SERPL BCG-MCNC: 4.1 G/DL (ref 3.5–5.2)
ALBUMIN UR-MCNC: NEGATIVE MG/DL
ALP SERPL-CCNC: 97 U/L (ref 35–104)
ALT SERPL W P-5'-P-CCNC: 28 U/L (ref 10–35)
ANION GAP SERPL CALCULATED.3IONS-SCNC: 10 MMOL/L (ref 7–15)
APPEARANCE UR: CLEAR
AST SERPL W P-5'-P-CCNC: 26 U/L (ref 10–35)
BACTERIA #/AREA URNS HPF: ABNORMAL /HPF
BASOPHILS # BLD AUTO: 0.1 10E3/UL (ref 0–0.2)
BASOPHILS NFR BLD AUTO: 1 %
BILIRUB DIRECT SERPL-MCNC: <0.2 MG/DL (ref 0–0.3)
BILIRUB SERPL-MCNC: 0.2 MG/DL
BILIRUB UR QL STRIP: NEGATIVE
BUN SERPL-MCNC: 13.3 MG/DL (ref 6–20)
CALCIUM SERPL-MCNC: 9.3 MG/DL (ref 8.6–10)
CHLORIDE SERPL-SCNC: 104 MMOL/L (ref 98–107)
COLOR UR AUTO: ABNORMAL
CREAT SERPL-MCNC: 0.73 MG/DL (ref 0.51–0.95)
DEPRECATED HCO3 PLAS-SCNC: 24 MMOL/L (ref 22–29)
EOSINOPHIL # BLD AUTO: 0.2 10E3/UL (ref 0–0.7)
EOSINOPHIL NFR BLD AUTO: 2 %
ERYTHROCYTE [DISTWIDTH] IN BLOOD BY AUTOMATED COUNT: 12.9 % (ref 10–15)
GFR SERPL CREATININE-BSD FRML MDRD: >90 ML/MIN/1.73M2
GLUCOSE SERPL-MCNC: 105 MG/DL (ref 70–99)
GLUCOSE UR STRIP-MCNC: NEGATIVE MG/DL
HCG UR QL: NEGATIVE
HCT VFR BLD AUTO: 43.9 % (ref 35–47)
HGB BLD-MCNC: 14.8 G/DL (ref 11.7–15.7)
HGB UR QL STRIP: NEGATIVE
HOLD SPECIMEN: NORMAL
IMM GRANULOCYTES # BLD: 0 10E3/UL
IMM GRANULOCYTES NFR BLD: 0 %
KETONES UR STRIP-MCNC: NEGATIVE MG/DL
LEUKOCYTE ESTERASE UR QL STRIP: NEGATIVE
LIPASE SERPL-CCNC: 17 U/L (ref 13–60)
LYMPHOCYTES # BLD AUTO: 1.8 10E3/UL (ref 0.8–5.3)
LYMPHOCYTES NFR BLD AUTO: 17 %
MCH RBC QN AUTO: 30.3 PG (ref 26.5–33)
MCHC RBC AUTO-ENTMCNC: 33.7 G/DL (ref 31.5–36.5)
MCV RBC AUTO: 90 FL (ref 78–100)
MONOCYTES # BLD AUTO: 0.4 10E3/UL (ref 0–1.3)
MONOCYTES NFR BLD AUTO: 4 %
MUCOUS THREADS #/AREA URNS LPF: PRESENT /LPF
NEUTROPHILS # BLD AUTO: 8 10E3/UL (ref 1.6–8.3)
NEUTROPHILS NFR BLD AUTO: 76 %
NITRATE UR QL: NEGATIVE
NRBC # BLD AUTO: 0 10E3/UL
NRBC BLD AUTO-RTO: 0 /100
PH UR STRIP: 7 [PH] (ref 5–7)
PLATELET # BLD AUTO: 403 10E3/UL (ref 150–450)
POTASSIUM SERPL-SCNC: 4.6 MMOL/L (ref 3.4–5.3)
PROT SERPL-MCNC: 7.8 G/DL (ref 6.4–8.3)
RBC # BLD AUTO: 4.88 10E6/UL (ref 3.8–5.2)
RBC URINE: 1 /HPF
SODIUM SERPL-SCNC: 138 MMOL/L (ref 136–145)
SP GR UR STRIP: 1.02 (ref 1–1.03)
SQUAMOUS EPITHELIAL: 1 /HPF
TROPONIN T SERPL HS-MCNC: <6 NG/L
UROBILINOGEN UR STRIP-MCNC: <2 MG/DL
WBC # BLD AUTO: 10.5 10E3/UL (ref 4–11)
WBC URINE: <1 /HPF

## 2023-02-10 PROCEDURE — 250N000009 HC RX 250: Performed by: EMERGENCY MEDICINE

## 2023-02-10 PROCEDURE — 99285 EMERGENCY DEPT VISIT HI MDM: CPT | Mod: 25

## 2023-02-10 PROCEDURE — 82248 BILIRUBIN DIRECT: CPT | Performed by: EMERGENCY MEDICINE

## 2023-02-10 PROCEDURE — 83690 ASSAY OF LIPASE: CPT | Performed by: EMERGENCY MEDICINE

## 2023-02-10 PROCEDURE — 250N000011 HC RX IP 250 OP 636: Performed by: EMERGENCY MEDICINE

## 2023-02-10 PROCEDURE — 81001 URINALYSIS AUTO W/SCOPE: CPT | Performed by: STUDENT IN AN ORGANIZED HEALTH CARE EDUCATION/TRAINING PROGRAM

## 2023-02-10 PROCEDURE — 85014 HEMATOCRIT: CPT | Performed by: EMERGENCY MEDICINE

## 2023-02-10 PROCEDURE — 84484 ASSAY OF TROPONIN QUANT: CPT | Performed by: EMERGENCY MEDICINE

## 2023-02-10 PROCEDURE — 74174 CTA ABD&PLVS W/CONTRAST: CPT

## 2023-02-10 PROCEDURE — 36415 COLL VENOUS BLD VENIPUNCTURE: CPT | Performed by: EMERGENCY MEDICINE

## 2023-02-10 PROCEDURE — 81025 URINE PREGNANCY TEST: CPT | Performed by: EMERGENCY MEDICINE

## 2023-02-10 PROCEDURE — 80053 COMPREHEN METABOLIC PANEL: CPT | Performed by: EMERGENCY MEDICINE

## 2023-02-10 PROCEDURE — 250N000013 HC RX MED GY IP 250 OP 250 PS 637: Performed by: EMERGENCY MEDICINE

## 2023-02-10 RX ORDER — ACETAMINOPHEN 325 MG/1
975 TABLET ORAL ONCE
Status: COMPLETED | OUTPATIENT
Start: 2023-02-10 | End: 2023-02-10

## 2023-02-10 RX ORDER — IOPAMIDOL 755 MG/ML
100 INJECTION, SOLUTION INTRAVASCULAR ONCE
Status: COMPLETED | OUTPATIENT
Start: 2023-02-10 | End: 2023-02-10

## 2023-02-10 RX ADMIN — ACETAMINOPHEN 975 MG: 325 TABLET ORAL at 16:33

## 2023-02-10 RX ADMIN — IOPAMIDOL 100 ML: 755 INJECTION, SOLUTION INTRAVENOUS at 17:44

## 2023-02-10 RX ADMIN — ALUMINUM HYDROXIDE, MAGNESIUM HYDROXIDE, AND SIMETHICONE 30 ML: 200; 200; 20 SUSPENSION ORAL at 16:33

## 2023-02-10 ASSESSMENT — ENCOUNTER SYMPTOMS
ABDOMINAL PAIN: 0
BACK PAIN: 1
DYSURIA: 0
COUGH: 0
FEVER: 0
VOMITING: 0
DIARRHEA: 0
NAUSEA: 0
SHORTNESS OF BREATH: 0

## 2023-02-10 ASSESSMENT — ACTIVITIES OF DAILY LIVING (ADL): ADLS_ACUITY_SCORE: 35

## 2023-02-10 NOTE — ED TRIAGE NOTES
Pt developed left sided chest pain yesterday. Today at noon, pain started radiating through to her back between her shoulder blades and up the left side of her neck into jaw. Pt denies any cardiac history. Pt also endorses intermittent light-headedness.      Triage Assessment     Row Name 02/10/23 1528       Triage Assessment (Adult)    Airway WDL WDL       Respiratory WDL    Respiratory WDL WDL       Skin Circulation/Temperature WDL    Skin Circulation/Temperature WDL WDL       Cardiac WDL    Cardiac WDL chest pain       Chest Pain Assessment    Chest Pain Location anterior chest, left    Chest Pain Radiation neck;back    Character sharp;aching       Peripheral/Neurovascular WDL    Peripheral Neurovascular WDL WDL       Cognitive/Neuro/Behavioral WDL    Cognitive/Neuro/Behavioral WDL WDL

## 2023-02-10 NOTE — ED PROVIDER NOTES
EMERGENCY DEPARTMENT ENCOUNTER      NAME: Alicia Smith  AGE: 37 year old female  YOB: 1985  MRN: [id number]  EVALUATION DATE & TIME: 2/10/2023  4:14 PM    PCP: Rosa Bentley    ED PROVIDER: Elena Alvarez M.D.        Chief Complaint   Patient presents with     Chest Pain         FINAL IMPRESSION:    1. Acute chest pain            MEDICAL DECISION MAKIN year old female with history of psoriatic arthritis who presents emergency department with left-sided chest pain that began yesterday.  Then began radiating to her back and neck.  EKG unremarkable.  Laboratories unremarkable and troponin less than 6.  CTA chest abdomen pelvis is unremarkable.  At this time I feel the patient is safe for discharge home to follow-up with family doctor as an outpatient.    Patient agrees with this plan and all of her questions have been answered.          ED COURSE:  4:22 PM  I met with the patient to gather history and perform my exam. ED course and treatment discussed.    6:09 PM  Reviewed imaging and laboratory results with patient.  These are all unremarkable.  Troponin less than 6 and therefore I do not think she requires a delta troponin.  CTA chest abdomen pelvis is negative.  No obvious PE.  She is hemodynamically stable otherwise.  Plan at this time is discharge home to follow-up with family doctor and return to the ER if anything worsens.    I do not think that this represents rib fractures, myocarditis, pericarditis, endocarditis, PE, ruptured AAA, pneumothorax, aortic dissection, bowel obstruction, bowel ischemia, cholecystitis, kidney stone, pyelonephritis, hypertensive urgency or emergency, or other such etiologies at this time.  I feel that ACS is less likely, though I can not fully ruleout ACS in the emergency department. At this time I feel that this patient can be discharged from the emergency department and can followup as directed.      COVID-19 PPE worn during patient  evaluation:  Mask: n95 and homemade masks   Eye Protection: goggles   Gown: none   Hair cover: yes  Face shield: none   Patient wearing a mask: yes     At the conclusion of the encounter I discussed the results of all of the tests and the disposition. Their questions were answered. The patient (and any family present) acknowledged understanding and were agreeable with the care plan.        CONSULTANTS:  none        MEDICATIONS GIVEN IN THE EMERGENCY:  Medications   lidocaine (viscous) (XYLOCAINE) 2 % 15 mL, alum & mag hydroxide-simethicone (MAALOX) 15 mL GI Cocktail (30 mLs Oral Given 2/10/23 1633)   acetaminophen (TYLENOL) tablet 975 mg (975 mg Oral Given 2/10/23 1633)   iopamidol (ISOVUE-370) solution 100 mL (100 mLs Intravenous Given 2/10/23 8364)           NEW PRESCRIPTIONS STARTED AT TODAY'S ER VISIT     Medication List      There are no discharge medications for this visit.             CONDITION:  stable        DISPOSITION:  D.c home         =================================================================  =================================================================  TRIAGE ASSESSMENT:  Pt developed left sided chest pain yesterday. Today at noon, pain started radiating through to her back between her shoulder blades and up the left side of her neck into jaw. Pt denies any cardiac history. Pt also endorses intermittent light-headedness.      Triage Assessment     Row Name 02/10/23 1528       Triage Assessment (Adult)    Airway WDL WDL       Respiratory WDL    Respiratory WDL WDL       Skin Circulation/Temperature WDL    Skin Circulation/Temperature WDL WDL       Cardiac WDL    Cardiac WDL chest pain       Chest Pain Assessment    Chest Pain Location anterior chest, left    Chest Pain Radiation neck;back    Character sharp;aching       Peripheral/Neurovascular WDL    Peripheral Neurovascular WDL WDL       Cognitive/Neuro/Behavioral WDL    Cognitive/Neuro/Behavioral WDL WDL                     ED Triage  "Vitals [02/10/23 1528]   Enc Vitals Group      BP (!) 194/101      Pulse 113      Resp 20      Temp 98.2  F (36.8  C)      Temp src Temporal      SpO2 96 %      Weight (!) 154.2 kg (340 lb)      Height 1.626 m (5' 4\")          ================================================================  ================================================================    HPI    Patient information was obtained from: patient    Use of Intrepreter: N/A     Alicia Smith is a 37 year old female with history of psoriatic arthritis, obesity who presents to the ER with complaints of chest pain.    Chest pain began yesterday and worsened today.  Located in the left chest in the left breast region and radiates to her back.  Then today started radiate into her neck.  She has not tried anything for pain prior to arrival.    Otherwise denies any fevers, cough, shortness of breath, abdominal pain, vomiting or diarrhea, leg pain or swelling.    She is on Humira and Otezla for psoriatic arthritis.      REVIEW OF SYSTEMS  Review of Systems   Constitutional: Negative for fever.   Respiratory: Negative for cough and shortness of breath.    Cardiovascular: Positive for chest pain. Negative for leg swelling.   Gastrointestinal: Negative for abdominal pain, diarrhea, nausea and vomiting.   Genitourinary: Negative for dysuria.   Musculoskeletal: Positive for back pain.   Allergic/Immunologic: Negative for immunocompromised state.   All other systems reviewed and are negative.        PAST MEDICAL HISTORY:  Past Medical History:   Diagnosis Date     Acute pancreatitis 2016     GERD (gastroesophageal reflux disease)      Infective otitis externa, right 08/29/2018     Irritable bowel syndrome      Lumbar radiculopathy      Psoriasis      Vitamin D deficiency          PAST SURGICAL HISTORY:  Past Surgical History:   Procedure Laterality Date     LASIK Bilateral 2016         CURRENT MEDICATIONS:    Prior to Admission medications    Medication Sig Start " "Date End Date Taking? Authorizing Provider   acetaminophen (TYLENOL) 500 MG tablet     Reported, Patient   adalimumab (HUMIRA *CF*) 40 MG/0.4ML pen kit Inject 0.4 mLs (40 mg) Subcutaneous every 14 days Hold for signs of infection, then seek medical attention. 11/7/22   Radha Gong NP   apremilast (OTEZLA) 30 MG tablet Take 1 tablet (30 mg) by mouth 2 times daily Hold for signs of infection, and seek medical attention 11/7/22   Radha Gong NP   Cholecalciferol (VITAMIN D) 2000 units tablet Take 1 tablet by mouth daily    Reported, Patient   ibuprofen (ADVIL/MOTRIN) 200 MG tablet     Reported, Patient   Loratadine (CLARITIN PO) Take  by mouth.    Reported, Patient   multivitamin w/minerals (THERA-VIT-M) tablet     Reported, Patient         ALLERGIES:  Allergies   Allergen Reactions     Sulfasalazine Rash         FAMILY HISTORY:  Family History   Problem Relation Age of Onset     Hypertension Other      Hyperlipidemia Mother      Diabetes Type 2  Mother      Obesity Father      Hypertension Father      Diabetes Type 2  Brother      Obesity Brother          SOCIAL HISTORY:  Social History     Socioeconomic History     Marital status: Single   Tobacco Use     Smoking status: Never     Smokeless tobacco: Never   Substance and Sexual Activity     Alcohol use: Yes     Comment: rarely     Drug use: No     Sexual activity: Yes         VITALS:  Patient Vitals for the past 24 hrs:   BP Temp Temp src Pulse Resp SpO2 Height Weight   02/10/23 1715 (!) 155/85 -- -- 90 13 99 % -- --   02/10/23 1700 (!) 173/88 -- -- 112 20 99 % -- --   02/10/23 1645 (!) 155/85 -- -- 83 14 100 % -- --   02/10/23 1630 (!) 161/86 -- -- 86 12 99 % -- --   02/10/23 1615 (!) 178/94 -- -- 102 -- 99 % -- --   02/10/23 1528 (!) 194/101 98.2  F (36.8  C) Temporal 113 20 96 % 1.626 m (5' 4\") (!) 154.2 kg (340 lb)       Wt Readings from Last 3 Encounters:   02/10/23 (!) 154.2 kg (340 lb)   12/29/22 (!) 152.6 kg (336 lb 6.4 oz)   11/07/22 (!) 155.3 " kg (342 lb 6.4 oz)       Estimated Creatinine Clearance: 155.9 mL/min (based on SCr of 0.73 mg/dL).    PHYSICAL EXAM    Constitutional:  Well developed, Well nourished, NAD, GCS 15  HENT:  Normocephalic, Atraumatic, Bilateral external ears normal, Nose normal. Neck- Supple, No stridor.   Eyes:  PERRL, EOMI, Conjunctiva normal, No discharge.  Respiratory:  Normal breath sounds, No respiratory distress, No wheezing, Speaks full sentences easily. No cough.   Cardiovascular:  Normal heart rate, Regular rhythm, No rubs, No gallops. Chest wall nontender.   GI:  No excessive obesity.  Bowel sounds normal, Soft, No tenderness, No masses, No flank tenderness. No rebound or guarding.   : deferred  Musculoskeletal: 2+ DP pulses. No edema. No cyanosis, No clubbing. Good range of motion in all major joints. No major deformities noted.   Integument:  Warm, Dry, No erythema, No rash.  No petechiae.  Neurologic:  Alert & oriented x 3  Psychiatric:  Affect normal, Cooperative         LAB:  All pertinent labs reviewed and interpreted.  Recent Results (from the past 24 hour(s))   Extra Blue Top Tube    Collection Time: 02/10/23  4:02 PM   Result Value Ref Range    Hold Specimen JIC    Extra Red Top Tube    Collection Time: 02/10/23  4:02 PM   Result Value Ref Range    Hold Specimen JIC    Extra Green Top (Lithium Heparin) Tube    Collection Time: 02/10/23  4:02 PM   Result Value Ref Range    Hold Specimen JIC    Extra Purple Top Tube    Collection Time: 02/10/23  4:02 PM   Result Value Ref Range    Hold Specimen JIC    Basic metabolic panel    Collection Time: 02/10/23  4:02 PM   Result Value Ref Range    Sodium 138 136 - 145 mmol/L    Potassium 4.6 3.4 - 5.3 mmol/L    Chloride 104 98 - 107 mmol/L    Carbon Dioxide (CO2) 24 22 - 29 mmol/L    Anion Gap 10 7 - 15 mmol/L    Urea Nitrogen 13.3 6.0 - 20.0 mg/dL    Creatinine 0.73 0.51 - 0.95 mg/dL    Calcium 9.3 8.6 - 10.0 mg/dL    Glucose 105 (H) 70 - 99 mg/dL    GFR Estimate >90 >60  mL/min/1.73m2   Troponin T, High Sensitivity (now)    Collection Time: 02/10/23  4:02 PM   Result Value Ref Range    Troponin T, High Sensitivity <6 <=14 ng/L   Hepatic function panel    Collection Time: 02/10/23  4:02 PM   Result Value Ref Range    Protein Total 7.8 6.4 - 8.3 g/dL    Albumin 4.1 3.5 - 5.2 g/dL    Bilirubin Total 0.2 <=1.2 mg/dL    Alkaline Phosphatase 97 35 - 104 U/L    AST 26 10 - 35 U/L    ALT 28 10 - 35 U/L    Bilirubin Direct <0.20 0.00 - 0.30 mg/dL   Lipase    Collection Time: 02/10/23  4:02 PM   Result Value Ref Range    Lipase 17 13 - 60 U/L   CBC with platelets and differential    Collection Time: 02/10/23  4:02 PM   Result Value Ref Range    WBC Count 10.5 4.0 - 11.0 10e3/uL    RBC Count 4.88 3.80 - 5.20 10e6/uL    Hemoglobin 14.8 11.7 - 15.7 g/dL    Hematocrit 43.9 35.0 - 47.0 %    MCV 90 78 - 100 fL    MCH 30.3 26.5 - 33.0 pg    MCHC 33.7 31.5 - 36.5 g/dL    RDW 12.9 10.0 - 15.0 %    Platelet Count 403 150 - 450 10e3/uL    % Neutrophils 76 %    % Lymphocytes 17 %    % Monocytes 4 %    % Eosinophils 2 %    % Basophils 1 %    % Immature Granulocytes 0 %    NRBCs per 100 WBC 0 <1 /100    Absolute Neutrophils 8.0 1.6 - 8.3 10e3/uL    Absolute Lymphocytes 1.8 0.8 - 5.3 10e3/uL    Absolute Monocytes 0.4 0.0 - 1.3 10e3/uL    Absolute Eosinophils 0.2 0.0 - 0.7 10e3/uL    Absolute Basophils 0.1 0.0 - 0.2 10e3/uL    Absolute Immature Granulocytes 0.0 <=0.4 10e3/uL    Absolute NRBCs 0.0 10e3/uL   UA with Microscopic reflex to Culture    Collection Time: 02/10/23  4:09 PM    Specimen: Urine, Midstream   Result Value Ref Range    Color Urine Light Yellow Colorless, Straw, Light Yellow, Yellow    Appearance Urine Clear Clear    Glucose Urine Negative Negative mg/dL    Bilirubin Urine Negative Negative    Ketones Urine Negative Negative mg/dL    Specific Gravity Urine 1.021 1.001 - 1.030    Blood Urine Negative Negative    pH Urine 7.0 5.0 - 7.0    Protein Albumin Urine Negative Negative mg/dL     Urobilinogen Urine <2.0 <2.0 mg/dL    Nitrite Urine Negative Negative    Leukocyte Esterase Urine Negative Negative    Bacteria Urine Few (A) None Seen /HPF    Mucus Urine Present (A) None Seen /LPF    RBC Urine 1 <=2 /HPF    WBC Urine <1 <=5 /HPF    Squamous Epithelials Urine 1 <=1 /HPF   HCG qualitative urine    Collection Time: 02/10/23  4:09 PM   Result Value Ref Range    hCG Urine Qualitative Negative Negative       No results found for: ABORH        RADIOLOGY:  Reviewed all pertinent imaging. Please see official radiology report.    EXAM: CTA CHEST ABDOMEN PELVIS W CONTRAST  LOCATION: [facility]  DATE/TIME: 2/10/2023 5:43 PM     INDICATION: left chest and back pain, radiates to neck  COMPARISON: 01/08/2020  TECHNIQUE: CT angiogram chest abdomen pelvis during arterial phase of injection of IV contrast. 2D and 3D MIP reconstructions were performed by the CT technologist. Dose reduction techniques were used.   CONTRAST: 100ml isovue 370     FINDINGS:   CT ANGIOGRAM CHEST, ABDOMEN, AND PELVIS: Initial noncontrast CT demonstrates no evidence of thoracic intramural hematoma. No evidence of thoracoabdominal aortic aneurysm or dissection. Common hepatic artery has a separate origin from the aorta. Patent   SMA, NIKHIL, right and 2 left renal arteries. No evidence of pulmonary embolus.      LUNGS AND PLEURA: No acute infiltrates, effusions, or pneumothorax.     MEDIASTINUM/AXILLAE: No significant mediastinal or hilar adenopathy     CORONARY ARTERY CALCIFICATION: None.     HEPATOBILIARY: Early arterial phase imaging demonstrates no acute abnormalities.     PANCREAS: Unremarkable     SPLEEN: Small exophytic low-attenuation lesion off the dome, likely a cyst.     ADRENAL GLANDS: Unremarkable     KIDNEYS/BLADDER: No hydronephrosis. Normal bladder contour.     BOWEL: No bowel obstruction.     LYMPH NODES: No significant retroperitoneal adenopathy     PELVIC ORGANS: No free fluid.     MUSCULOSKELETAL:  No acute bony abnormalities. Small fat-containing umbilical hernia.                                                                      IMPRESSION:  1.  No evidence of thoracoabdominal aortic aneurysm or dissection.          EKG:    Indication: Chest pain    Performed at: 15:29p  Impression: Sinus rhythm at 88 bpm.  Flipped T waves noted in lead III, aVR and V1.  IL interval 152 ms, QRS 84 ms, QTc 435 ms.  EKG does appear similar to one from January 8, 2020.      I have independently reviewed and interpreted the EKG(s) documented above.        PROCEDURES:  none    Medical Decision Making    History:    Supplemental history from: Documented in chart, if applicable    External Record(s) reviewed: Documented in chart, if applicable.    Work Up:    Chart documentation includes differential considered and any EKGs or imaging independently interpreted by provider, where specified.    In additional to work up documented, I considered the following work up: Documented in chart, if applicable.    External consultation:    Discussion of management with another provider: Documented in chart, if applicable    Complicating factors:    Care impacted by chronic illness: N/A    Care affected by social determinants of health: N/A    Disposition considerations: Discharge. No recommendations on prescription strength medication(s). N/A.      Elena Alvarez M.D. [facility]  Emergency Medicine and Medical Toxicology  Former[facility] EMERGENCY DEPARTMENT  [facility] [facility] [address] [phone]  Dept: [phone]           Elena Alvarez MD  02/10/23 3488

## 2023-02-10 NOTE — Clinical Note
Alicia Smith was seen and treated in our emergency department on 2/10/2023.  She may return to work on 02/11/2023.       If you have any questions or concerns, please don't hesitate to call.      Elena Alvarez MD

## 2023-02-11 NOTE — DISCHARGE INSTRUCTIONS
Your EKG, heart blood test, CT scan looking at your chest and lungs all look good.  Nothing at this time to suggest heart attack, or other life-threatening conditions.    Sometimes these pain conditions can just resolve on their own.  I do recommend that you follow-up with family doctor on Monday if your symptoms continue or return the emergency department with worse pain, difficulty breathing, fever, or any other concerns.    Thank you for choosing [facility] Emergency Department.  It has been my pleasure caring for you today.     ~Dr. Kim MD

## 2023-03-09 ENCOUNTER — OFFICE VISIT (OUTPATIENT)
Dept: RHEUMATOLOGY | Facility: CLINIC | Age: 38
End: 2023-03-09
Payer: COMMERCIAL

## 2023-03-09 ENCOUNTER — LAB (OUTPATIENT)
Dept: LAB | Facility: CLINIC | Age: 38
End: 2023-03-09
Payer: COMMERCIAL

## 2023-03-09 VITALS
SYSTOLIC BLOOD PRESSURE: 110 MMHG | WEIGHT: 293 LBS | TEMPERATURE: 98.7 F | HEART RATE: 70 BPM | OXYGEN SATURATION: 100 % | DIASTOLIC BLOOD PRESSURE: 81 MMHG | BODY MASS INDEX: 58.81 KG/M2

## 2023-03-09 DIAGNOSIS — L40.50 PSORIATIC ARTHRITIS (H): ICD-10-CM

## 2023-03-09 DIAGNOSIS — Z79.899 ENCOUNTER FOR LONG-TERM (CURRENT) USE OF HIGH-RISK MEDICATION: ICD-10-CM

## 2023-03-09 DIAGNOSIS — L40.9 PSORIASIS: ICD-10-CM

## 2023-03-09 LAB
ALBUMIN SERPL-MCNC: 3.5 G/DL (ref 3.4–5)
ALT SERPL W P-5'-P-CCNC: 37 U/L (ref 0–50)
AST SERPL W P-5'-P-CCNC: 22 U/L (ref 0–45)
BASOPHILS # BLD AUTO: 0.1 10E3/UL (ref 0–0.2)
BASOPHILS NFR BLD AUTO: 1 %
CREAT SERPL-MCNC: 0.71 MG/DL (ref 0.52–1.04)
CRP SERPL-MCNC: 24.9 MG/L (ref 0–8)
EOSINOPHIL # BLD AUTO: 0.4 10E3/UL (ref 0–0.7)
EOSINOPHIL NFR BLD AUTO: 4 %
ERYTHROCYTE [DISTWIDTH] IN BLOOD BY AUTOMATED COUNT: 12.9 % (ref 10–15)
ERYTHROCYTE [SEDIMENTATION RATE] IN BLOOD BY WESTERGREN METHOD: 25 MM/HR (ref 0–20)
GFR SERPL CREATININE-BSD FRML MDRD: >90 ML/MIN/1.73M2
HCT VFR BLD AUTO: 41.9 % (ref 35–47)
HGB BLD-MCNC: 13.8 G/DL (ref 11.7–15.7)
IMM GRANULOCYTES # BLD: 0 10E3/UL
IMM GRANULOCYTES NFR BLD: 0 %
LYMPHOCYTES # BLD AUTO: 2.8 10E3/UL (ref 0.8–5.3)
LYMPHOCYTES NFR BLD AUTO: 27 %
MCH RBC QN AUTO: 29.8 PG (ref 26.5–33)
MCHC RBC AUTO-ENTMCNC: 32.9 G/DL (ref 31.5–36.5)
MCV RBC AUTO: 91 FL (ref 78–100)
MONOCYTES # BLD AUTO: 0.4 10E3/UL (ref 0–1.3)
MONOCYTES NFR BLD AUTO: 4 %
NEUTROPHILS # BLD AUTO: 6.5 10E3/UL (ref 1.6–8.3)
NEUTROPHILS NFR BLD AUTO: 64 %
NRBC # BLD AUTO: 0 10E3/UL
NRBC BLD AUTO-RTO: 0 /100
PLATELET # BLD AUTO: 385 10E3/UL (ref 150–450)
RBC # BLD AUTO: 4.63 10E6/UL (ref 3.8–5.2)
WBC # BLD AUTO: 10.1 10E3/UL (ref 4–11)

## 2023-03-09 PROCEDURE — 84460 ALANINE AMINO (ALT) (SGPT): CPT

## 2023-03-09 PROCEDURE — 82565 ASSAY OF CREATININE: CPT

## 2023-03-09 PROCEDURE — 86140 C-REACTIVE PROTEIN: CPT

## 2023-03-09 PROCEDURE — 82040 ASSAY OF SERUM ALBUMIN: CPT

## 2023-03-09 PROCEDURE — 84450 TRANSFERASE (AST) (SGOT): CPT

## 2023-03-09 PROCEDURE — 85025 COMPLETE CBC W/AUTO DIFF WBC: CPT

## 2023-03-09 PROCEDURE — 99214 OFFICE O/P EST MOD 30 MIN: CPT | Performed by: NURSE PRACTITIONER

## 2023-03-09 PROCEDURE — 36415 COLL VENOUS BLD VENIPUNCTURE: CPT

## 2023-03-09 PROCEDURE — 85652 RBC SED RATE AUTOMATED: CPT

## 2023-03-09 NOTE — NURSING NOTE
"Alicia Smith's goals for this visit include:   Chief Complaint   Patient presents with     RECHECK     Primary osteoarthritis of right knee  Psoriatic arthritis          PCP: Rosa Bentley    Referring Provider:  No referring provider defined for this encounter.      Initial BP (!) 177/82 (BP Location: Left arm, Patient Position: Sitting, Cuff Size: Adult Regular)   Pulse 78   Temp 98.7  F (37.1  C) (Oral)   Wt (!) 155.4 kg (342 lb 9.6 oz)   LMP 03/05/2023 (Approximate)   SpO2 100%   Breastfeeding No   BMI 58.81 kg/m   Estimated body mass index is 58.81 kg/m  as calculated from the following:    Height as of 2/10/23: 1.626 m (5' 4\").    Weight as of this encounter: 155.4 kg (342 lb 9.6 oz).    Medication Reconciliation: complete    Do you need any medication refills at today's visit? none    SUZETTE Banks  Rheumatology/Infectious disease  [facility]   [phone]    "

## 2023-03-09 NOTE — PROGRESS NOTES
"    Rheumatology Clinic Visit  Radha Gong, CNP      Alicia Smith  YOB: 1985    Age: 37 year old   MRN# [id number]       Date of Visit: 03/09/2023  Primary care provider: Rosa Bentley              Subjective:     Alicia is a 36 year old female presents today for transfer of care from Dr. Alejandro for PSA/PSO (dx 2020). Current treatment: Humira 40 mg every 2 weeks (2/22), Otezla 30 mg BID (8/21)      Today:   PSA: Doing ok, has improved from last visit, however fingers and toes will occ swell for about a day. Hand leg pain has improved %50 since adding humira and both otelzla and humira has about %90 improvement, over all happy were she is at, with minor bad days.   OA: Knee pain is present, worse at end of day but over all not changing, no instability. R>L  PSO: Improved but still active of ears  Wgt: Stable.    HPI:  Pt had thrombocytosis and neutrophilia and found to be likely related to inflammatory process, was having joint pain and rash and found to have PSA/PSO.    LOV 7/5/2022 with Dr. Alejandro:  \"Psoriatic arthritis: Pain in her joints started in 2020 in her right leg. Pain radiated from right groin into the thigh.  MRI lumbar spine showed mild DDD. She had bilateral hip injection which helped for a week. In 4/2021 she noticed sausage like swelling of her right third, fourth toes.  She had pain and tenderness over left wrist, right ankle and right knee and swelling over her wrist and ankles.  Also had numbness in the left thumb, index and middle finger. She has hx of Psoriasis.      Her clinical history, physical exam is consistent with psoriatic arthritis.  She has dactylitis, enthesitis in the form of plantar fasciitis.  She has synovitis in left wrist, bilateral ankles.     She has negative rheumatoid factor, anti-CCP, HLA-B27 genotype.  X-ray of bilateral hand, wrist, feet showed no erosive disease. She was given prednisone taper and responded very well.  Otezla was started in " "8/2021 for psoriatic arthritis.  Denies any side effects.  It cleared psoriasis, it is 70% better.  SSZ was added due to persistent joint pains in 1/2022 but developed skin rash due to it. Later Humira was started in 2/2022. She has noted marked improvement on Humira.       Will continue Humira 40 mg subcutaneous once every 2 weeks.      Psoriasis: Psoriasis is better on Otezla and Humira.    Lumbar degenerative disc disease: She also has lumbar degenerative disc disease.  Reports chronic low back pain and follows with orthopedic.   Right knee Osteoarthritis : She has OA in her right knee and is seeing [facility] for it. She received intra articular steroid injection in her right knee in 2/2022 which helped.    Morbid obesity: She is morbidly obese.  Once overall inflammation is under control she should focus on weight loss, physical exercise, pool therapy which will help her long-term.\"    Thrombocytosis and neutrophilia work up 5/25/2021 LOV with Dr. Szymanski  \"I discussed with her that I suspect that the elevated white blood cell count is due to neutrophils and the mildly elevated platelet count is most likely reactive to some sort of inflammatory condition going on.  It is quite possible that this is connected to the joint pains that she is having.  I wonder whether she has a seronegative arthritis like psoriatic arthritis underlying.  We will know when she has the rheumatology work-up.     Meanwhile we will go for a work-up to make sure that she does not have an underlying myeloproliferative disorder that is responsible for the neutrophilia and thrombocytosis.  She was agreeable to doing a systematic work-up.  If an underlying hematological disorder is ruled out, then she need not worry about that again.   Today we will obtain the following labs: Ferritin, TIBC panel, BCR/ABL qualitative PCR, JAK2 mutation with reflex to MPL and PERNELL R mutations.   I discussed with her that once I have the results of the " "gene tests available, I will give her a call.  I asked her to expect a call in about 2 weeks time.\"      Vaccinations: Vaccinations reviewed with Ms. Smith. Risks and benefits of vaccinations were discussed.  - Influenza: encouraged yearly vaccination. 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     FMH:  NO AI dz.     Active Problem list:  Patient Active Problem List    Diagnosis Date Noted    Irritable bowel syndrome with both constipation and diarrhea 12/29/2022     Priority: Medium    Nonspecific elevation of levels of transaminase or lactic acid dehydrogenase (LDH) 12/29/2022     Priority: Medium     Formatting of this note might be different from the original.  Created by Conversion      Seasonal allergies 12/29/2022     Priority: Medium    Psoriatic arthritis (H) 11/07/2022     Priority: Medium    Psoriasis 11/07/2022     Priority: Medium    DDD (degenerative disc disease), lumbar 11/07/2022     Priority: Medium    Primary osteoarthritis of right knee 11/07/2022     Priority: Medium    Morbid obesity (H) 07/08/2021     Priority: Medium    Neutrophilia 05/25/2021     Priority: Medium    Thrombocytosis 05/25/2021     Priority: Medium    Vitamin D deficiency 04/23/2018     Priority: Medium            Objective:     Physical Exam  Blood Pressure (Abnormal) 177/82 (BP Location: Left arm, Patient Position: Sitting, Cuff Size: Adult Regular)   Pulse 78   Temperature 98.7  F (37.1  C) (Oral)   Weight (Abnormal) 155.4 kg (342 lb 9.6 oz)   Last Menstrual Period 03/05/2023 (Approximate)   Oxygen Saturation 100%   Breastfeeding No   Body Mass Index 58.81 kg/m  "   Body mass index is 58.81 kg/m .    Constitutional: lg body habitus with out gross deformities. Well groomed.   Psych: nl judgement, orientation, memory, affect.  Eyes: wnl EOM, PERRLA, vision, conjunctiva, sclera   ENT: wnl external ears, nose, hearing, lips, teeth, gums, throat. No mucous membrane lesions, normal saliva pool  Neck: no mass, thyroid enlargement, enlarged cervical lymph nodes or parotid glands  Resp: lungs clear to auscultation, nl work of breathing  CV: RRR, no murmurs, rubs or gallops, no edema  Skin: no nail pitting, alopecia, Behind right ear has quarter size indurated plaque of PSO and some in both ear canals  Neuro: Strength WNL of bilateral upper and lower extremity large muscle groups.     MSK- (T=tender to palpation, S=swelling)  Body habitus limits exam  TMJ: -T/S, FROM   Cervical spine:  FROM  Shoulders: -T/S, FROM   Elbows: -T/S, FROM   Wrists: -T/S, FROM   Hands: -T/S, FROM, Normal  strength. No dactylitis.   Hips: FROM  Knees: -T/S, slow crepitus ROM, R>L         Labs:    Lab Results   Component Value Date    ALT 37 03/09/2023    AST 22 03/09/2023    CR 0.71 03/09/2023    CRP 24.9 (H) 03/09/2023      Latest Reference Range & Units 11/07/22 10:24   Sed Rate 0 - 20 mm/hr 22 (H)       Imaging:            Assessment and Plan:     #1) PSA/PSO (dx 2020): Symptoms 90% improved on combination of humira and otezla. CBC, cr, lfts normal. CRP ESR still elevated, this may be related to morbid obesity. Exam shows a small amount of active PSO behind ears and significant crepitus bl ROM of knees.  Given continued PSO and elevated CRP consider adding methotrexate and stopping otezla if able. Pt has past elevated LFT's with suspected fatty liver as well as ETOH use increases during summer time cabin trips and vacations.  We will evaluate again in 4 months and determine if change to methotrexate at that time.     #2) Morbid obesity: Pt will let us know if she would like a referral to weight loss  clinic    Pt instructions:    1) Medications  -Continue humira 40 mg sub c every 2 weeks   -Continue Otezla 30 mg twice a day    2) Labs in 3 months, want to check humira level so please have labs be 7-10 days after last shot    3) See me back in August     Pt states understanding and agreement to plan of care, has no further questions.     Radha Gong, CNP  Rheumatology, [facility]      Addendum 7/26/23:  MTM ordered to switch biologic.    Latest Reference Range & Units 07/22/23 09:45   Adalimumab Activity >=0.65  Not Detected   Adalimumab Neutralizing Antibody  1:67   EER Adalimumab  See Note         Radha Gong, NP

## 2023-03-09 NOTE — PATIENT INSTRUCTIONS
1) Medications  -Continue humira 40 mg sub c every 2 weeks   -Continue Otezla 30 mg twice a day    2) Labs in 3 months, want to check humira level so please have labs be 7-10 days after last shot    3) See me back in August

## 2023-03-23 ENCOUNTER — ANCILLARY PROCEDURE (OUTPATIENT)
Dept: ULTRASOUND IMAGING | Facility: CLINIC | Age: 38
End: 2023-03-23
Attending: NURSE PRACTITIONER
Payer: COMMERCIAL

## 2023-03-23 ENCOUNTER — OFFICE VISIT (OUTPATIENT)
Dept: FAMILY MEDICINE | Facility: CLINIC | Age: 38
End: 2023-03-23
Payer: COMMERCIAL

## 2023-03-23 VITALS
HEIGHT: 64 IN | HEART RATE: 95 BPM | TEMPERATURE: 98.3 F | BODY MASS INDEX: 50.02 KG/M2 | SYSTOLIC BLOOD PRESSURE: 124 MMHG | OXYGEN SATURATION: 98 % | WEIGHT: 293 LBS | RESPIRATION RATE: 11 BRPM | DIASTOLIC BLOOD PRESSURE: 88 MMHG

## 2023-03-23 DIAGNOSIS — Z11.4 SCREENING FOR HIV (HUMAN IMMUNODEFICIENCY VIRUS): ICD-10-CM

## 2023-03-23 DIAGNOSIS — E66.01 MORBID OBESITY (H): ICD-10-CM

## 2023-03-23 DIAGNOSIS — D84.89 OTHER IMMUNODEFICIENCIES (H): ICD-10-CM

## 2023-03-23 DIAGNOSIS — L40.50 PSORIATIC ARTHRITIS (H): ICD-10-CM

## 2023-03-23 DIAGNOSIS — M79.662 PAIN OF LEFT CALF: ICD-10-CM

## 2023-03-23 DIAGNOSIS — M79.662 PAIN OF LEFT CALF: Primary | ICD-10-CM

## 2023-03-23 DIAGNOSIS — M51.369 DDD (DEGENERATIVE DISC DISEASE), LUMBAR: ICD-10-CM

## 2023-03-23 DIAGNOSIS — R07.9 CHEST PAIN, UNSPECIFIED TYPE: ICD-10-CM

## 2023-03-23 PROCEDURE — 99215 OFFICE O/P EST HI 40 MIN: CPT | Performed by: NURSE PRACTITIONER

## 2023-03-23 PROCEDURE — 93971 EXTREMITY STUDY: CPT | Mod: LT | Performed by: STUDENT IN AN ORGANIZED HEALTH CARE EDUCATION/TRAINING PROGRAM

## 2023-03-23 ASSESSMENT — PAIN SCALES - GENERAL: PAINLEVEL: MILD PAIN (2)

## 2023-03-23 NOTE — PROGRESS NOTES
"  Assessment & Plan     Pain of left calf  Getting US to rule out DVT,. Reviewed red flag symptoms/indications for ER visit. I will call with any abnormal results, otherwise, check  My Chart for results. 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  Recheck weight in 6 months.      Chest pain, unspecified type  Increase Prilosec to BId dosing, follow back for persisitent symptoms and would pursure further GI work up.  - omeprazole (PRILOSEC) 20 MG DR capsule  Dispense: 60 capsule; Refill: 2    Other immunodeficiencies (H)  Neutrophilia and thrombocytosis- rheumatology is working this up.    DDD (degenerative disc disease), lumbar  Followed by Orthopedics    Screening for HIV (human immunodeficiency virus)    - HIV Antigen Antibody Combo      Ordering of each unique test  Prescription drug management  43  minutes spent on the date of the encounter doing chart review, history and exam, documentation and further activities per the note     MED REC REQUIRED  Post Medication Reconciliation Status:       BMI:   Estimated body mass index is 57.78 kg/m  as calculated from the following:    Height as of this encounter: 1.626 m (5' 4\").    Weight as of this encounter: 152.7 kg (336 lb 9.6 oz).   Weight management plan: Discussed healthy diet and exercise guidelines    Work on weight loss  Regular exercise  See Patient Instructions    EVIE Canales [facility] TIMMY Vargas is a 37 year old, presenting for the following health issues:  Hospital F/U  No flowsheet data found.  HPI "       Hospital Follow-up Visit:    Hospital/Nursing Home/IP Rehab Facility: [facility]   Date of Admission: 2/10  Date of Discharge: 2/10   Reason(s) for Admission: chest pain     Was your hospitalization related to COVID-19? No   Problems taking medications regularly:  None  Medication changes since discharge: None  Problems adhering to non-medication therapy:  None    Summary of hospitalization:  [facility] discharge summary reviewed  Diagnostic Tests/Treatments reviewed.  Follow up needed: none  Other Healthcare Providers Involved in Patient s Care:         None  Update since discharge: improved. She continues to have left sided chest pain but has started taking Prilosec 20 mg daily and her symptoms have improved somewhat but are still present.  She occasionally has retrosternal pain that is unrelated to food intake or activity, lasts about 10 min and resolves on its own. Symptoms are not worse when lying flat.. she had negative ER work up to include unremarkable EKG, normal labs, troponin less than 6, CTA of chest, abdomen and pelvis was unremarkable 2/10/23.     Left calf pain- intermittent and ongoing for over a year but worsening in the last few weeks, pain is posterolateral, worse with prolonged sitting.  She also complains of light brown pigmentation of posteriomedial knee that she attributes to varicosities.  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 She denies shortness of breath, has also had intermittent chest pain that she attributes to GERD, has intermittent bilateral leg swelling worse with prolonged standing.  She wears compression stocking periodically and they help with symptoms.    Plan of care communicated with patient       Psoriatic Arthritis- followed by rheumatology and on  Otezla and Humira.      How many servings of fruits and vegetables do you eat daily?  2-3    On average, how many sweetened beverages do you drink each day  "(Examples: soda, juice, sweet tea, etc.  Do NOT count diet or artificially sweetened beverages)?   1    How many days per week do you exercise enough to make your heart beat faster? 3 or less    How many minutes a day do you exercise enough to make your heart beat faster? 10 - 19    How many days per week do you miss taking your medication? 0        Review of Systems   Constitutional, HEENT, cardiovascular, pulmonary, gi and gu systems are negative, except as otherwise noted.      Objective    /88 (BP Location: Left arm, Patient Position: Sitting, Cuff Size: Adult Large)   Pulse 95   Temp 98.3  F (36.8  C) (Oral)   Resp 11   Ht 1.626 m (5' 4\")   Wt (!) 152.7 kg (336 lb 9.6 oz)   LMP 03/05/2023 (Approximate)   SpO2 98%   BMI 57.78 kg/m    Body mass index is 57.78 kg/m .  Physical Exam   GENERAL: healthy, alert and no distress  EYES: Eyes grossly normal to inspection, PERRL and conjunctivae and sclerae normal  HENT: ear canals and TM's normal, nose and mouth without ulcers or lesions  NECK: no adenopathy, no asymmetry, masses, or scars and thyroid normal to palpation  RESP: lungs clear to auscultation - no rales, rhonchi or wheezes  CV: regular rate and rhythm, normal S1 S2, no S3 or S4, no murmur, click or rub, no peripheral edema and peripheral pulses strong  ABDOMEN: soft, nontender, no hepatosplenomegaly, no masses and bowel sounds normal  MS: no gross musculoskeletal defects noted, no edema  SKIN: no suspicious lesions or rashes  NEURO: Normal strength and tone, mentation intact and speech normal  BACK: no CVA tenderness, no paralumbar tenderness  PSYCH: mentation appears normal, affect normal/bright  LYMPH: normal ant/post cervical, supraclavicular nodes    No results found for this or any previous visit (from the past 24 hour(s)).  No results found for this visit on 03/23/23.          "

## 2023-03-23 NOTE — PATIENT INSTRUCTIONS
At [facility], we strive to deliver an exceptional experience to you, every time we see you. If you receive a survey, please complete it as we do value your feedback.  If you have MyChart, you can expect to receive results automatically within 24 hours of their completion.  Your provider will send a note interpreting your results as well.   If you do not have MyChart, you should receive your results in about a week by mail.    Your care team:                            Family Medicine Internal Medicine   MD Dakota Corcoran MD Shantel Branch-Fleming, MD Srinivasa Vaka, MD Katya Belousova, PAEVIE Mcmahon CNP, MD ([facility]   Julio Devlin, MD Monse Allen MD Amelia Massimini APRN JERMAIN Benavidez APRN MD Shekhar Milton MD          Clinic hours: Monday - Thursday 7 am-6 pm; Fridays 7 am-5 pm.   Urgent care: Monday - Friday 10 am- 8 pm; Saturday and Sunday 9 am-5 pm.    Clinic: [phone]       [facility]: Monday - Thursday 8 am - 7 pm; Friday 8 am - 6 pm  [facility]: [phone]

## 2023-04-29 ENCOUNTER — HEALTH MAINTENANCE LETTER (OUTPATIENT)
Age: 38
End: 2023-04-29

## 2023-06-24 ENCOUNTER — LAB (OUTPATIENT)
Dept: LAB | Facility: CLINIC | Age: 38
End: 2023-06-24
Payer: COMMERCIAL

## 2023-06-24 DIAGNOSIS — L40.9 PSORIASIS: ICD-10-CM

## 2023-06-24 DIAGNOSIS — Z79.899 ENCOUNTER FOR LONG-TERM (CURRENT) USE OF HIGH-RISK MEDICATION: ICD-10-CM

## 2023-06-24 DIAGNOSIS — L40.50 PSORIATIC ARTHRITIS (H): ICD-10-CM

## 2023-06-24 LAB
ALBUMIN SERPL BCG-MCNC: 3.9 G/DL (ref 3.5–5.2)
ALT SERPL W P-5'-P-CCNC: 22 U/L (ref 0–50)
AST SERPL W P-5'-P-CCNC: 20 U/L (ref 0–45)
BASOPHILS # BLD AUTO: 0.1 10E3/UL (ref 0–0.2)
BASOPHILS NFR BLD AUTO: 1 %
CREAT SERPL-MCNC: 0.76 MG/DL (ref 0.51–0.95)
CRP SERPL-MCNC: 25.9 MG/L
EOSINOPHIL # BLD AUTO: 0.4 10E3/UL (ref 0–0.7)
EOSINOPHIL NFR BLD AUTO: 4 %
ERYTHROCYTE [DISTWIDTH] IN BLOOD BY AUTOMATED COUNT: 13.3 % (ref 10–15)
ERYTHROCYTE [SEDIMENTATION RATE] IN BLOOD BY WESTERGREN METHOD: 25 MM/HR (ref 0–20)
GFR SERPL CREATININE-BSD FRML MDRD: >90 ML/MIN/1.73M2
HCT VFR BLD AUTO: 40.5 % (ref 35–47)
HGB BLD-MCNC: 13.4 G/DL (ref 11.7–15.7)
IMM GRANULOCYTES # BLD: 0 10E3/UL
IMM GRANULOCYTES NFR BLD: 0 %
LYMPHOCYTES # BLD AUTO: 2.7 10E3/UL (ref 0.8–5.3)
LYMPHOCYTES NFR BLD AUTO: 27 %
MCH RBC QN AUTO: 30.2 PG (ref 26.5–33)
MCHC RBC AUTO-ENTMCNC: 33.1 G/DL (ref 31.5–36.5)
MCV RBC AUTO: 91 FL (ref 78–100)
MONOCYTES # BLD AUTO: 0.5 10E3/UL (ref 0–1.3)
MONOCYTES NFR BLD AUTO: 5 %
NEUTROPHILS # BLD AUTO: 6.5 10E3/UL (ref 1.6–8.3)
NEUTROPHILS NFR BLD AUTO: 63 %
PLATELET # BLD AUTO: 323 10E3/UL (ref 150–450)
RBC # BLD AUTO: 4.44 10E6/UL (ref 3.8–5.2)
WBC # BLD AUTO: 10.2 10E3/UL (ref 4–11)

## 2023-06-24 PROCEDURE — 82040 ASSAY OF SERUM ALBUMIN: CPT

## 2023-06-24 PROCEDURE — 84460 ALANINE AMINO (ALT) (SGPT): CPT

## 2023-06-24 PROCEDURE — 86140 C-REACTIVE PROTEIN: CPT

## 2023-06-24 PROCEDURE — 85652 RBC SED RATE AUTOMATED: CPT

## 2023-06-24 PROCEDURE — 36415 COLL VENOUS BLD VENIPUNCTURE: CPT

## 2023-06-24 PROCEDURE — 84450 TRANSFERASE (AST) (SGOT): CPT

## 2023-06-24 PROCEDURE — 85025 COMPLETE CBC W/AUTO DIFF WBC: CPT

## 2023-06-24 PROCEDURE — 82565 ASSAY OF CREATININE: CPT

## 2023-07-22 ENCOUNTER — LAB (OUTPATIENT)
Dept: LAB | Facility: CLINIC | Age: 38
End: 2023-07-22
Payer: COMMERCIAL

## 2023-07-22 DIAGNOSIS — M79.662 PAIN OF LEFT CALF: ICD-10-CM

## 2023-07-22 DIAGNOSIS — Z11.4 SCREENING FOR HIV (HUMAN IMMUNODEFICIENCY VIRUS): ICD-10-CM

## 2023-07-22 DIAGNOSIS — L40.50 PSORIATIC ARTHRITIS (H): ICD-10-CM

## 2023-07-22 PROCEDURE — 82397 CHEMILUMINESCENT ASSAY: CPT | Mod: 90

## 2023-07-22 PROCEDURE — 80145 DRUG ASSAY ADALIMUMAB: CPT | Mod: 90

## 2023-07-22 PROCEDURE — 99000 SPECIMEN HANDLING OFFICE-LAB: CPT

## 2023-07-22 PROCEDURE — 36415 COLL VENOUS BLD VENIPUNCTURE: CPT

## 2023-07-24 LAB
ADALIMUMAB NAB TITR SER BIOASSAY: NORMAL {TITER}
ADALIMUMAB SERPL-MCNC: NOT DETECTED UG/ML
PATHOLOGY STUDY: NORMAL

## 2023-07-26 DIAGNOSIS — L40.50 PSORIATIC ARTHRITIS (H): Primary | ICD-10-CM

## 2023-07-28 ENCOUNTER — TELEPHONE (OUTPATIENT)
Dept: RHEUMATOLOGY | Facility: CLINIC | Age: 38
End: 2023-07-28
Payer: COMMERCIAL

## 2023-07-28 ENCOUNTER — VIRTUAL VISIT (OUTPATIENT)
Dept: RHEUMATOLOGY | Facility: CLINIC | Age: 38
End: 2023-07-28
Attending: NURSE PRACTITIONER
Payer: COMMERCIAL

## 2023-07-28 DIAGNOSIS — K21.9 GASTROESOPHAGEAL REFLUX DISEASE WITHOUT ESOPHAGITIS: ICD-10-CM

## 2023-07-28 DIAGNOSIS — Z78.9 TAKES DIETARY SUPPLEMENTS: ICD-10-CM

## 2023-07-28 DIAGNOSIS — L40.50 PSORIATIC ARTHRITIS (H): Primary | ICD-10-CM

## 2023-07-28 DIAGNOSIS — Z71.85 VACCINE COUNSELING: ICD-10-CM

## 2023-07-28 DIAGNOSIS — J30.2 SEASONAL ALLERGIES: ICD-10-CM

## 2023-07-28 NOTE — PATIENT INSTRUCTIONS
"Recommendations from today's MTM visit:                                                       1. We are working on obtaining insurance coverage for Enbrel. Once this is approved the pharmacy will call you to set up delivery so you can start weekly injections. Start Enbrel 50 mg weekly at least 2 weeks after your last Humira dose.    2. Continue Otezla 30 mg twice daily.    3 . A common side effect of Enbrel is injection site reactions (red, raised, itchy spot at injection site). You can use hydrocortisone cream and ice to treat these reactions if they occur.    4. Vaccine recommendations: Shingrix series, Prevnar 20, bivalent covid booster    Follow-up: Return in about 3 months (around 10/28/2023) for MTM Pharmacist Visit.    It was great speaking with you today.  I value your experience and would be very thankful for your time in providing feedback in our clinic survey. In the next few days, you may receive an email or text message from [employer] with a link to a survey related to your  clinical pharmacist.\"     To schedule another MTM appointment, please call the clinic directly or you may call the MTM scheduling line at [phone] or toll-free at [phone].     My Clinical Pharmacist's contact information:                                                      Please feel free to contact me with any questions or concerns you have.      Debbie Bedolla, PharmD  Medication Therapy Management Pharmacist  [facility]  Phone: [phone]     "

## 2023-07-28 NOTE — LETTER
7/28/2023       RE: Alicia Smith  [address] [place] [address]     Dear Colleague,    Thank you for referring your patient, Alicia Smith, to the [facility] [place] [facility]. Please see a copy of my visit note below.    Medication Therapy Management (MTM) Encounter    ASSESSMENT:                            Medication Adherence/Access: No issues identified    Psoriatic Arthritis: Patient doing well with minimal psoriatic arthritis symptoms but due to recent labs indicating no Humira activity, would benefit from changing biologics. Recommend Enbrel 50 mg weekly in conjunction with Otezla 30 mg twice daily. Provided education on Enbrel today including dosing, general administration, side effects (both common/serious), precautions, monitoring and time to efficacy. Discussed data on malignancy and risk of serious infection in depth. Encouraged indicated non-live vaccines and avoidance of live vaccines. Discussed potential need to hold therapy in the setting of signs/symptoms of active infection. Encouraged her to contact the rheumatology clinic in the event she has questions on this. Would benefit from starting Enbrel at least 2 weeks after the last Humira dose and using weekly as directed.    Vaccines: Per ACIP recommendations, eligible for Shingrix series, Prevnar 20 and bivalent covid booster.    GERD: Stable.    Allergic Rhinitis: Stable.    Supplements: Stable.    PLAN:                            1. We are working on obtaining insurance coverage for Enbrel. Once this is approved the pharmacy will call you to set up delivery so you can start weekly injections. Start Enbrel 50 mg weekly at least 2 weeks after your last Humira dose.    2. Continue Otezla 30 mg twice daily.    3 . A common side effect of Enbrel is injection site reactions (red, raised, itchy spot at injection site). You can use hydrocortisone cream and  ice to treat these reactions if they occur.    4. Vaccine recommendations: Shingrix series, Prevnar 20, bivalent covid booster    Follow-up: Return in about 3 months (around 10/28/2023) for MTM Pharmacist Visit.    SUBJECTIVE/OBJECTIVE:                          Alicia Smith is a 37 year old female called for an initial visit. She was referred to me from Radha Gong NP.      Reason for visit: Humira no longer effective per labs, need to change biologic therapy    Allergies/ADRs: Reviewed in chart  Past Medical History: Reviewed in chart  Tobacco: She reports that she has never smoked. She has never used smokeless tobacco.  Alcohol: Less than 1 beverage / month - does tend [place] drink more alcohol [place]    Medication Adherence/Access: no issues reported    Psoriatic Arthritis:   Humira 40 mg every other week  Otezla 30 mg twice daily   Tylenol 500 mg as needed   Ibuprofen 200 mg as needed     Reports her joint symptoms are well controlled on Humira, has minor psoriasis patches but does not find these particularly bothersome. Completed labs last week that showed antibodies against Humira - rheumatologist requested she switch biologic therapy due to this. No side effects from current medications noted.    Liver Function Studies -   Recent Labs   Lab Test 06/24/23  0953 03/09/23  0739 02/10/23  1602   PROTTOTAL  --   --  7.8   ALBUMIN 3.9   < > 4.1   BILITOTAL  --   --  0.2   ALKPHOS  --   --  97   AST 20   < > 26   ALT 22   < > 28    < > = values in this interval not displayed.     CBC RESULTS:   Recent Labs   Lab Test 06/24/23  0953   WBC 10.2   RBC 4.44   HGB 13.4   HCT 40.5   MCV 91   MCH 30.2   MCHC 33.1   RDW 13.3            Vaccines: Due for Shingrix series, Prevnar 20 and bivalent covid booster.  Immunization History   Administered Date(s) Administered    COVID-19 Bivalent 18+ (Moderna) 10/11/2022    COVID-19 MONOVALENT 12+ (Pfizer) 01/15/2021, 02/05/2021    COVID-19  Monovalent 18+ (Moderna) 12/21/2021    FLU 6-35 months 10/10/2012, 11/05/2013, 09/22/2016    Flu, Unspecified 09/01/2017, 09/15/2019, 10/27/2022    Influenza (IIV3) PF 10/08/2010, 09/21/2011, 09/29/2015, 10/12/2021    Influenza Vaccine >6 months (Alfuria,Fluzone) 09/19/2017, 10/11/2019, 09/14/2020    TDAP (Adacel,Boostrix) 09/14/2020     GERD:   Omeprazole 20 mg twice daily   Patient feels that current regimen is effective. No side effects or concerns noted.    Allergic Rhinitis:   Loratadine 10 mg once daily  Patient reports no current medication side effects.   Patient feels that current therapy is effective.     Supplements:   Multivitamin daily  Vitamin D 2000 units daily  No reported issues at this time.     Today's Vitals: There were no vitals taken for this visit.  ----------------    I spent 21 minutes with this patient today. All changes were made via collaborative practice agreement with Radha Gong. A copy of the visit note was provided to the patient's provider(s).    A summary of these recommendations was sent via [employer].    Kaylee MedinaD  Medication Therapy Management Pharmacist  [facility]  Phone: [phone]    Telemedicine Visit Details  Type of service:  Telephone visit  Start Time: 9:00 AM  End Time: 9:21 AM     Medication Therapy Recommendations  Psoriatic arthritis (H)    Current Medication: adalimumab (HUMIRA *CF*) 40 MG/0.4ML pen kit (Discontinued)   Rationale: More effective medication available - Ineffective medication - Effectiveness   Recommendation: Change Medication - ENBREL SURECLICK SC - 50 mg weekly   Status: Accepted per CPA          Current Medication: etanercept (ENBREL SURECLICK) 50 MG/ML autoinjector   Rationale: Does not understand instructions - Adherence - Adherence   Recommendation: Provide Education   Status: Patient Agreed - Adherence/Education         Vaccine counseling    Rationale: Preventive therapy - Needs additional  medication therapy - Indication   Recommendation: Start Medication - Shingrix 50 MCG/0.5ML Susr   Status: Accepted - no CPA Needed            LAVINIA IRVING, [facility]

## 2023-07-28 NOTE — TELEPHONE ENCOUNTER
PA Initiation    Medication: ENBREL SURECLICK 50 MG/ML SC SOAJ  Insurance Company: [employer] - Phone [phone] Fax [phone]  Pharmacy Filling the Rx: [facility] - [place], [place] - [address]  Filling Pharmacy Phone:    Filling Pharmacy Fax:    Start Date: 7/28/2023      SOCO Gilmore, [facility]  Specialty Pharmacy Clinic [facility]    [email or web address]     Phone: [phone]  Fax: [phone]

## 2023-07-31 NOTE — TELEPHONE ENCOUNTER
Prior Authorization Approval    Medication: ENBREL SURECLICK 50 MG/ML SC SOAJ  Authorization Effective Date: 7/31/2023  Authorization Expiration Date: 12/31/2023  Approved Dose/Quantity: 4 pens   Reference #: [id number]   Insurance Company: [employer] - Phone [phone] Fax [phone]  Expected CoPay:       CoPay Card Available: Yes    Financial Assistance Needed: sent [employer] message   Which Pharmacy is filling the prescription: [facility] - [address]  Pharmacy Notified: Yes  Patient Notified: Yes        SOCO Gilmore, [facility]  Specialty Pharmacy Clinic Liaison     [facility]    [email or web address]     Phone: [phone]  Fax: [phone]

## 2023-08-10 ENCOUNTER — OFFICE VISIT (OUTPATIENT)
Dept: RHEUMATOLOGY | Facility: CLINIC | Age: 38
End: 2023-08-10
Payer: COMMERCIAL

## 2023-08-10 VITALS
BODY MASS INDEX: 57.28 KG/M2 | WEIGHT: 293 LBS | SYSTOLIC BLOOD PRESSURE: 110 MMHG | DIASTOLIC BLOOD PRESSURE: 78 MMHG | HEART RATE: 68 BPM | TEMPERATURE: 97.9 F | OXYGEN SATURATION: 99 %

## 2023-08-10 DIAGNOSIS — L40.9 PSORIASIS: ICD-10-CM

## 2023-08-10 DIAGNOSIS — E66.01 MORBID OBESITY (H): Primary | ICD-10-CM

## 2023-08-10 DIAGNOSIS — L40.50 PSORIATIC ARTHRITIS (H): ICD-10-CM

## 2023-08-10 PROCEDURE — 99214 OFFICE O/P EST MOD 30 MIN: CPT | Performed by: NURSE PRACTITIONER

## 2023-08-10 RX ORDER — APREMILAST 30 MG/1
30 TABLET, FILM COATED ORAL 2 TIMES DAILY
Qty: 180 TABLET | Refills: 3 | Status: SHIPPED | OUTPATIENT
Start: 2023-08-10 | End: 2023-12-14

## 2023-08-10 ASSESSMENT — PAIN SCALES - GENERAL: PAINLEVEL: MILD PAIN (3)

## 2023-08-10 NOTE — PROGRESS NOTES
"    Rheumatology Clinic Visit  Radha Gong, JERMAIN      Alicia Smith  YOB: 1985    Age: 37 year old   MRN# [id number]       Date of Visit: 08/10/2023  Primary care provider: Rosa Bentley              Subjective:     Alicia is a 36 year old female presents today for follow-up for PSA/PSO (dx 2020). Current treatment: Enbrel 50 mg weekly (8/23) , Otezla 30 mg BID (8/21)      Today:   PSA: Doing ok, fingers and toes will occ swell for about a day. minor bad days.   OA: Knee pain is present, worse at end of day but over all not changing, no instability. R>L  PSO: Improved but still active of ears  Wgt: Stable.    HPI:  Pt had thrombocytosis and neutrophilia and found to be likely related to inflammatory process, was having joint pain and rash and found to have PSA/PSO.    LOV 7/5/2022 with Dr. Alejandro:  \"Psoriatic arthritis: Pain in her joints started in 2020 in her right leg. Pain radiated from right groin into the thigh.  MRI lumbar spine showed mild DDD. She had bilateral hip injection which helped for a week. In 4/2021 she noticed sausage like swelling of her right third, fourth toes.  She had pain and tenderness over left wrist, right ankle and right knee and swelling over her wrist and ankles.  Also had numbness in the left thumb, index and middle finger. She has hx of Psoriasis.      Her clinical history, physical exam is consistent with psoriatic arthritis.  She has dactylitis, enthesitis in the form of plantar fasciitis.  She has synovitis in left wrist, bilateral ankles.     She has negative rheumatoid factor, anti-CCP, HLA-B27 genotype.  X-ray of bilateral hand, wrist, feet showed no erosive disease. She was given prednisone taper and responded very well.  Otezla was started in 8/2021 for psoriatic arthritis.  Denies any side effects.  It cleared psoriasis, it is 70% better.  SSZ was added due to persistent joint pains in 1/2022 but developed skin rash due to it. Later Humira was started " "in 2/2022. She has noted marked improvement on Humira.       Will continue Humira 40 mg subcutaneous once every 2 weeks.      Psoriasis: Psoriasis is better on Otezla and Humira.    Lumbar degenerative disc disease: She also has lumbar degenerative disc disease.  Reports chronic low back pain and follows with orthopedic.   Right knee Osteoarthritis : She has OA in her right knee and is seeing [facility] for it. She received intra articular steroid injection in her right knee in 2/2022 which helped.    Morbid obesity: She is morbidly obese.  Once overall inflammation is under control she should focus on weight loss, physical exercise, pool therapy which will help her long-term.\"    Thrombocytosis and neutrophilia work up 5/25/2021 LOV with Dr. Szymanski  \"I discussed with her that I suspect that the elevated white blood cell count is due to neutrophils and the mildly elevated platelet count is most likely reactive to some sort of inflammatory condition going on.  It is quite possible that this is connected to the joint pains that she is having.  I wonder whether she has a seronegative arthritis like psoriatic arthritis underlying.  We will know when she has the rheumatology work-up.     Meanwhile we will go for a work-up to make sure that she does not have an underlying myeloproliferative disorder that is responsible for the neutrophilia and thrombocytosis.  She was agreeable to doing a systematic work-up.  If an underlying hematological disorder is ruled out, then she need not worry about that again.   Today we will obtain the following labs: Ferritin, TIBC panel, BCR/ABL qualitative PCR, JAK2 mutation with reflex to MPL and PERNELL R mutations.   I discussed with her that once I have the results of the gene tests available, I will give her a call.  I asked her to expect a call in about 2 weeks time.\"      Vaccinations: Vaccinations reviewed with Ms. Smith. Risks and benefits of vaccinations were discussed.  - " Influenza: encouraged yearly vaccination. 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     FMH:  NO AI dz.     Active Problem list:  Patient Active Problem List    Diagnosis Date Noted    Other immunodeficiencies (H) 03/23/2023     Priority: Medium    Irritable bowel syndrome with both constipation and diarrhea 12/29/2022     Priority: Medium    Nonspecific elevation of levels of transaminase or lactic acid dehydrogenase (LDH) 12/29/2022     Priority: Medium     Formatting of this note might be different from the original.  Created by Conversion      Seasonal allergies 12/29/2022     Priority: Medium    Psoriatic arthritis (H) 11/07/2022     Priority: Medium    Psoriasis 11/07/2022     Priority: Medium    DDD (degenerative disc disease), lumbar 11/07/2022     Priority: Medium    Primary osteoarthritis of right knee 11/07/2022     Priority: Medium    Morbid obesity (H) 07/08/2021     Priority: Medium    Neutrophilia 05/25/2021     Priority: Medium    Thrombocytosis 05/25/2021     Priority: Medium    Vitamin D deficiency 04/23/2018     Priority: Medium            Objective:     Physical Exam  Blood Pressure 110/78 (BP Location: Left arm, Patient Position: Sitting, Cuff Size: Adult Large)   Pulse 68   Temperature 97.9  F (36.6  C) (Oral)   Weight (Abnormal) 151.4 kg (333 lb 11.2 oz)   Last Menstrual Period 07/14/2023 (Exact Date)   Oxygen Saturation 99%   Breastfeeding No   Body Mass Index 57.28 kg/m    Body mass index is 57.28 kg/m .    Constitutional: lg body habitus with out gross  deformities. Well groomed.   Psych: nl judgement, orientation, memory, affect.  Eyes: wnl EOM, PERRLA, vision, conjunctiva, sclera   ENT: wnl external ears, nose, hearing, lips, teeth, gums, throat. No mucous membrane lesions, normal saliva pool  Neck: no mass, thyroid enlargement, enlarged cervical lymph nodes or parotid glands  Resp: lungs clear to auscultation, nl work of breathing  CV: RRR, no murmurs, rubs or gallops, no edema  Skin: no nail pitting, alopecia. Has moderatae size PSO plaque behind right ear  with scale and induration, see pic below, mild in both ear canals  Neuro: Strength WNL of bilateral upper and lower extremity large muscle groups.     MSK- (T=tender to palpation, S=swelling)  Body habitus limits exam  TMJ: -T/S, FROM   Cervical spine:  FROM  Shoulders: -T/S, FROM   Elbows: -T/S, FROM   Wrists: -T/S, FROM   Hands: -T/S, FROM, Normal  strength. No dactylitis.   Hips: FROM  Knees: -T/S, slow crepitus ROM, R>L         Labs:    Lab Results   Component Value Date    ALT 22 06/24/2023    AST 20 06/24/2023    CR 0.76 06/24/2023    CRP 24.9 (H) 03/09/2023      Latest Reference Range & Units 11/07/22 10:24   Sed Rate 0 - 20 mm/hr 22 (H)       Imaging:            Assessment and Plan:     #1) PSA/PSO (dx 2020): Active PSO increasing in size, joint pain continues of hands. Lost ground on humira and found to have drug tequila and no level. Changed to Enbrel and has had 1 dose. Pt has past elevated LFT's with suspected fatty liver as well as ETOH use increases during summer time cabin trips and vacations, therefore methotrexate has been avoided. If Enbrel is effective and loses efficacy consider starting pre-switching biologis in future.     #2) Morbid obesity: Will refer to med management     Pt instructions:    1) Continue otezla 30 mg twice a day, Enbrel 50 mg weekly    2) Labs in September and every 2-3 months    3) See me back in November/ December.     4) See wgt management     Pt states  understanding and agreement to plan of care, has no further questions.     Radha Gong, CNP  Rheumatology,  Health

## 2023-08-10 NOTE — NURSING NOTE
"Alicia Smith's goals for this visit include:   Chief Complaint   Patient presents with    RECHECK     Psoriatic arthritis       PCP: Rosa Bentley    Referring Provider:  No referring provider defined for this encounter.      Initial /78 (BP Location: Left arm, Patient Position: Sitting, Cuff Size: Adult Large)   Pulse 68   Temp 97.9  F (36.6  C) (Oral)   Wt (!) 151.4 kg (333 lb 11.2 oz)   LMP 07/14/2023 (Exact Date)   SpO2 99%   Breastfeeding No   BMI 57.28 kg/m   Estimated body mass index is 57.28 kg/m  as calculated from the following:    Height as of 3/23/23: 1.626 m (5' 4\").    Weight as of this encounter: 151.4 kg (333 lb 11.2 oz).    Medication Reconciliation: complete    Do you need any medication refills at today's visit? none    SUZETTE Banks  Rheumatology/Infectious disease  [facility]   [phone]      "

## 2023-08-10 NOTE — PATIENT INSTRUCTIONS
1) Continue otezla 30 mg twice a day, Enbrel 50 mg weekly    2) Labs in September and every 2-3 months    3) See me back in November/ December.     4) See wgt management

## 2023-09-07 ENCOUNTER — PHARMACY VISIT (OUTPATIENT)
Dept: ADMINISTRATIVE | Facility: CLINIC | Age: 38
End: 2023-09-07
Payer: COMMERCIAL

## 2023-09-07 NOTE — PROGRESS NOTES
Psoriatic Arthritis Clinical Follow Up Assessment     Spoke with Alicia briefly for assessment. Current Regimen: Enbrel 50mg weekly + Otezla 30mg BID   Denies side effects, injection site reactions, missed doses, medication/allergy changes. PDC = 1.00     PsA/PsO: States everything has been going well since switching from Humira to Enbrel in early August. She's had 5 doses of the Enbrel at this point (2 orders so far delivered on 8/3 + 8/30). Feeling improvements in her arthritis joint pains. No questions/concerns today.     Follow up: Patient to follow up with MTM pharmacist in clinic moving forward      Louisa Hill, PharmD  Therapy Management Pharmacist  [facility]  [facility]

## 2023-09-20 ENCOUNTER — LAB (OUTPATIENT)
Dept: LAB | Facility: CLINIC | Age: 38
End: 2023-09-20
Payer: COMMERCIAL

## 2023-09-20 DIAGNOSIS — L40.9 PSORIASIS: ICD-10-CM

## 2023-09-20 DIAGNOSIS — L40.50 PSORIATIC ARTHRITIS (H): ICD-10-CM

## 2023-09-20 LAB
ALBUMIN SERPL BCG-MCNC: 4.2 G/DL (ref 3.5–5.2)
ALT SERPL W P-5'-P-CCNC: 23 U/L (ref 0–50)
AST SERPL W P-5'-P-CCNC: 26 U/L (ref 0–45)
CREAT SERPL-MCNC: 0.77 MG/DL (ref 0.51–0.95)
CRP SERPL-MCNC: 32.9 MG/L
EGFRCR SERPLBLD CKD-EPI 2021: >90 ML/MIN/1.73M2
ERYTHROCYTE [DISTWIDTH] IN BLOOD BY AUTOMATED COUNT: 12.9 % (ref 10–15)
ERYTHROCYTE [SEDIMENTATION RATE] IN BLOOD BY WESTERGREN METHOD: 25 MM/HR (ref 0–20)
HCT VFR BLD AUTO: 40.8 % (ref 35–47)
HGB BLD-MCNC: 13.4 G/DL (ref 11.7–15.7)
MCH RBC QN AUTO: 29.9 PG (ref 26.5–33)
MCHC RBC AUTO-ENTMCNC: 32.8 G/DL (ref 31.5–36.5)
MCV RBC AUTO: 91 FL (ref 78–100)
PLATELET # BLD AUTO: 383 10E3/UL (ref 150–450)
RBC # BLD AUTO: 4.48 10E6/UL (ref 3.8–5.2)
WBC # BLD AUTO: 10.7 10E3/UL (ref 4–11)

## 2023-09-20 PROCEDURE — 82565 ASSAY OF CREATININE: CPT

## 2023-09-20 PROCEDURE — 82040 ASSAY OF SERUM ALBUMIN: CPT

## 2023-09-20 PROCEDURE — 84460 ALANINE AMINO (ALT) (SGPT): CPT

## 2023-09-20 PROCEDURE — 85027 COMPLETE CBC AUTOMATED: CPT

## 2023-09-20 PROCEDURE — 36415 COLL VENOUS BLD VENIPUNCTURE: CPT

## 2023-09-20 PROCEDURE — 86140 C-REACTIVE PROTEIN: CPT

## 2023-09-20 PROCEDURE — 85652 RBC SED RATE AUTOMATED: CPT

## 2023-09-20 PROCEDURE — 84450 TRANSFERASE (AST) (SGOT): CPT

## 2023-12-14 ENCOUNTER — LAB (OUTPATIENT)
Dept: LAB | Facility: CLINIC | Age: 38
End: 2023-12-14
Payer: COMMERCIAL

## 2023-12-14 ENCOUNTER — OFFICE VISIT (OUTPATIENT)
Dept: RHEUMATOLOGY | Facility: CLINIC | Age: 38
End: 2023-12-14
Payer: COMMERCIAL

## 2023-12-14 VITALS
BODY MASS INDEX: 57.54 KG/M2 | TEMPERATURE: 98.2 F | DIASTOLIC BLOOD PRESSURE: 82 MMHG | HEART RATE: 78 BPM | SYSTOLIC BLOOD PRESSURE: 124 MMHG | WEIGHT: 293 LBS | OXYGEN SATURATION: 99 %

## 2023-12-14 DIAGNOSIS — L40.50 PSORIATIC ARTHRITIS (H): ICD-10-CM

## 2023-12-14 DIAGNOSIS — R07.9 CHEST PAIN, UNSPECIFIED TYPE: ICD-10-CM

## 2023-12-14 DIAGNOSIS — L40.9 PSORIASIS: ICD-10-CM

## 2023-12-14 DIAGNOSIS — Z11.4 SCREENING FOR HIV (HUMAN IMMUNODEFICIENCY VIRUS): Primary | ICD-10-CM

## 2023-12-14 LAB
ALBUMIN SERPL BCG-MCNC: 4.2 G/DL (ref 3.5–5.2)
ALT SERPL W P-5'-P-CCNC: 23 U/L (ref 0–50)
AST SERPL W P-5'-P-CCNC: 30 U/L (ref 0–45)
CREAT SERPL-MCNC: 0.78 MG/DL (ref 0.51–0.95)
CRP SERPL-MCNC: 24 MG/L
EGFRCR SERPLBLD CKD-EPI 2021: >90 ML/MIN/1.73M2
ERYTHROCYTE [DISTWIDTH] IN BLOOD BY AUTOMATED COUNT: 13.3 % (ref 10–15)
ERYTHROCYTE [SEDIMENTATION RATE] IN BLOOD BY WESTERGREN METHOD: 11 MM/HR (ref 0–20)
HCT VFR BLD AUTO: 42.1 % (ref 35–47)
HGB BLD-MCNC: 13.6 G/DL (ref 11.7–15.7)
MCH RBC QN AUTO: 29.6 PG (ref 26.5–33)
MCHC RBC AUTO-ENTMCNC: 32.3 G/DL (ref 31.5–36.5)
MCV RBC AUTO: 92 FL (ref 78–100)
PLATELET # BLD AUTO: 361 10E3/UL (ref 150–450)
RBC # BLD AUTO: 4.6 10E6/UL (ref 3.8–5.2)
WBC # BLD AUTO: 10.7 10E3/UL (ref 4–11)

## 2023-12-14 PROCEDURE — 36415 COLL VENOUS BLD VENIPUNCTURE: CPT

## 2023-12-14 PROCEDURE — 85652 RBC SED RATE AUTOMATED: CPT

## 2023-12-14 PROCEDURE — 84450 TRANSFERASE (AST) (SGOT): CPT

## 2023-12-14 PROCEDURE — 82565 ASSAY OF CREATININE: CPT

## 2023-12-14 PROCEDURE — 84460 ALANINE AMINO (ALT) (SGPT): CPT

## 2023-12-14 PROCEDURE — 86140 C-REACTIVE PROTEIN: CPT

## 2023-12-14 PROCEDURE — 82040 ASSAY OF SERUM ALBUMIN: CPT

## 2023-12-14 PROCEDURE — 99214 OFFICE O/P EST MOD 30 MIN: CPT | Performed by: NURSE PRACTITIONER

## 2023-12-14 PROCEDURE — 85027 COMPLETE CBC AUTOMATED: CPT

## 2023-12-14 RX ORDER — APREMILAST 30 MG/1
30 TABLET, FILM COATED ORAL 2 TIMES DAILY
Qty: 180 TABLET | Refills: 3 | OUTPATIENT
Start: 2023-12-14 | End: 2023-12-22

## 2023-12-14 NOTE — NURSING NOTE
"Alicia Smith's goals for this visit include:   Chief Complaint   Patient presents with    RECHECK     4-5 month follow-up on psoriatic arthritis, labs prior to appointment       PCP: Rosa Bentley    Referring Provider:  No referring provider defined for this encounter.      Initial /82 (BP Location: Left arm, Patient Position: Sitting, Cuff Size: Adult Large)   Pulse 78   Temp 98.2  F (36.8  C) (Oral)   Wt (!) 152 kg (335 lb 3.2 oz)   SpO2 99%   Breastfeeding No   BMI 57.54 kg/m   Estimated body mass index is 57.54 kg/m  as calculated from the following:    Height as of 3/23/23: 1.626 m (5' 4\").    Weight as of this encounter: 152 kg (335 lb 3.2 oz).    Medication Reconciliation: complete    Do you need any medication refills at today's visit? none    SUZETTE Banks  Rheumatology/Infectious disease  [facility]   [phone]      "

## 2023-12-14 NOTE — PATIENT INSTRUCTIONS
1) Labs every 3 months    2) Continue Enbrel 50 mg weekly, apremilast 30 mg twice a day     3) See me back in 4 months, sooner with questions

## 2023-12-14 NOTE — PROGRESS NOTES
"    Rheumatology Clinic Visit  Radha Gong, JEMRAIN      Alicia Smith  YOB: 1985    Age: 38 year old   MRN# [id number]       Date of Visit: 12/14/2023  Primary care provider: Rosa Bentley              Subjective:     Alicia is a 38 year old female presents today for follow-up for PSA/PSO (dx 2020). Current treatment: Enbrel 50 mg weekly (8/23) , Otezla 30 mg BID (8/21)      12/14/23:    PSA:  Hands are 90 % improved on Enbrel, R knee is 75% improved with still some warmth and pain with use.     PSO: Improved but still active of ears  Wgt: Stable. Sees dietary in Feb     HPI:  Pt had thrombocytosis and neutrophilia and found to be likely related to inflammatory process, was having joint pain and rash and found to have PSA/PSO.    LOV 7/5/2022 with Dr. Alejandro:  \"Psoriatic arthritis: Pain in her joints started in 2020 in her right leg. Pain radiated from right groin into the thigh.  MRI lumbar spine showed mild DDD. She had bilateral hip injection which helped for a week. In 4/2021 she noticed sausage like swelling of her right third, fourth toes.  She had pain and tenderness over left wrist, right ankle and right knee and swelling over her wrist and ankles.  Also had numbness in the left thumb, index and middle finger. She has hx of Psoriasis.      Her clinical history, physical exam is consistent with psoriatic arthritis.  She has dactylitis, enthesitis in the form of plantar fasciitis.  She has synovitis in left wrist, bilateral ankles.     She has negative rheumatoid factor, anti-CCP, HLA-B27 genotype.  X-ray of bilateral hand, wrist, feet showed no erosive disease. She was given prednisone taper and responded very well.  Otezla was started in 8/2021 for psoriatic arthritis.  Denies any side effects.  It cleared psoriasis, it is 70% better.  SSZ was added due to persistent joint pains in 1/2022 but developed skin rash due to it. Later Humira was started in 2/2022. She has noted marked " "improvement on Humira.       Will continue Humira 40 mg subcutaneous once every 2 weeks.      Psoriasis: Psoriasis is better on Otezla and Humira.    Lumbar degenerative disc disease: She also has lumbar degenerative disc disease.  Reports chronic low back pain and follows with orthopedic.   Right knee Osteoarthritis : She has OA in her right knee and is seeing [facility] for it. She received intra articular steroid injection in her right knee in 2/2022 which helped.    Morbid obesity: She is morbidly obese.  Once overall inflammation is under control she should focus on weight loss, physical exercise, pool therapy which will help her long-term.\"    Thrombocytosis and neutrophilia work up 5/25/2021 LOV with Dr. Szymanski  \"I discussed with her that I suspect that the elevated white blood cell count is due to neutrophils and the mildly elevated platelet count is most likely reactive to some sort of inflammatory condition going on.  It is quite possible that this is connected to the joint pains that she is having.  I wonder whether she has a seronegative arthritis like psoriatic arthritis underlying.  We will know when she has the rheumatology work-up.     Meanwhile we will go for a work-up to make sure that she does not have an underlying myeloproliferative disorder that is responsible for the neutrophilia and thrombocytosis.  She was agreeable to doing a systematic work-up.  If an underlying hematological disorder is ruled out, then she need not worry about that again.   Today we will obtain the following labs: Ferritin, TIBC panel, BCR/ABL qualitative PCR, JAK2 mutation with reflex to MPL and PERNELL R mutations.   I discussed with her that once I have the results of the gene tests available, I will give her a call.  I asked her to expect a call in about 2 weeks time.\"      Vaccinations: Vaccinations reviewed with Ms. Smiht. Risks and benefits of vaccinations were discussed.  - Influenza: encouraged yearly " vaccination. 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     FMH:  NO AI dz.     Active Problem list:  Patient Active Problem List    Diagnosis Date Noted    Other immunodeficiencies (H24) 03/23/2023     Priority: Medium    Irritable bowel syndrome with both constipation and diarrhea 12/29/2022     Priority: Medium    Nonspecific elevation of levels of transaminase or lactic acid dehydrogenase (LDH) 12/29/2022     Priority: Medium     Formatting of this note might be different from the original.  Created by Conversion      Seasonal allergies 12/29/2022     Priority: Medium    Psoriatic arthritis (H) 11/07/2022     Priority: Medium    Psoriasis 11/07/2022     Priority: Medium    DDD (degenerative disc disease), lumbar 11/07/2022     Priority: Medium    Primary osteoarthritis of right knee 11/07/2022     Priority: Medium    Morbid obesity (H) 07/08/2021     Priority: Medium    Neutrophilia 05/25/2021     Priority: Medium    Thrombocytosis 05/25/2021     Priority: Medium    Vitamin D deficiency 04/23/2018     Priority: Medium            Objective:     Physical Exam  /82 (BP Location: Left arm, Patient Position: Sitting, Cuff Size: Adult Large)   Pulse 78   Temp 98.2  F (36.8  C) (Oral)   Wt (!) 152 kg (335 lb 3.2 oz)   LMP  (LMP Unknown)   SpO2 99%   Breastfeeding No   BMI 57.54 kg/m    Body mass index is 57.54 kg/m .    Constitutional: lg body habitus with out gross deformities. Well groomed.   Psych: nl judgement, orientation, memory, affect.  Eyes: wnl EOM, PERRLA, vision,  conjunctiva, sclera   ENT: wnl external ears, nose, hearing, lips, teeth, gums, throat. No mucous membrane lesions, normal saliva pool  Neck: no mass, thyroid enlargement, enlarged cervical lymph nodes or parotid glands  Resp: l nl work of breathing    Skin: no nail pitting, alopecia. Has small size PSO plaque behind right ear that is significantly improved, no longer indurated see pic below, mild in both ear canals  Neuro: Strength WNL of bilateral upper and lower extremity large muscle groups.     MSK- (T=tender to palpation, S=swelling)  Body habitus limits exam  TMJ: -T/S, FROM   Cervical spine:  FROM  Shoulders: -T/S, FROM   Elbows: -T/S, FROM   Wrists: -T/S, FROM   Hands: -T/S, FROM, Normal  strength. No dactylitis.   Hips: FROM  Knees: R knee warm. FROM, no TTP        Labs:    Lab Results   Component Value Date    ALT 23 09/20/2023    AST 26 09/20/2023    CR 0.77 09/20/2023    CRP 24.9 (H) 03/09/2023      Latest Reference Range & Units 11/07/22 10:24   Sed Rate 0 - 20 mm/hr 22 (H)       Imaging:              Assessment and Plan:     1) PSA/PSO (dx 2020):  Significant improvement on Enbrel.   Pt has past elevated LFT's with suspected fatty liver as well as ETOH use increases during summer time cabin trips and vacations, therefore methotrexate has been avoided. Developed drug tequila to humira and lost benefit. If Enbrel  loses efficacy consider starting methotrexate pre-switching biologis in future.     2) Morbid obesity: Has appt with wgt management in Feb.    Pt instructions:    1) Labs every 3 months    2) Continue Enbrel 50 mg weekly, apremilast 30 mg twice a day     3) See me back in 4 months, sooner with questions     Pt states understanding and agreement to plan of care, has no further questions.     Radha Gong, CNP  Rheumatology, [facility]

## 2023-12-22 DIAGNOSIS — L40.50 PSORIATIC ARTHRITIS (H): ICD-10-CM

## 2023-12-26 RX ORDER — APREMILAST 30 MG/1
30 TABLET, FILM COATED ORAL 2 TIMES DAILY
Qty: 180 TABLET | Refills: 3 | Status: SHIPPED | OUTPATIENT
Start: 2023-12-26 | End: 2024-09-09

## 2024-01-03 ENCOUNTER — TELEPHONE (OUTPATIENT)
Dept: RHEUMATOLOGY | Facility: CLINIC | Age: 39
End: 2024-01-03
Payer: COMMERCIAL

## 2024-01-03 NOTE — TELEPHONE ENCOUNTER
PA Initiation    Medication: OTEZLA 30 MG PO TABS  Insurance Company: [employer] [place] - Phone [phone] Fax [phone]  Pharmacy Filling the Rx:    Filling Pharmacy Phone:    Filling Pharmacy Fax:    Start Date: 1/3/2024      SOCO Gilmore, [facility]  Specialty Pharmacy Clinic [facility]    [email or web address]     Phone: [phone]  Fax: [phone]

## 2024-01-03 NOTE — TELEPHONE ENCOUNTER
PA Initiation    Medication: ENBREL SURECLICK 50 MG/ML SC SOAJ  Insurance Company: [employer] [place] - Phone [phone] Fax [phone]  Pharmacy Filling the Rx:    Filling Pharmacy Phone:    Filling Pharmacy Fax:    Start Date: 1/3/2024      Waiting on clinicals      SOCO Gilmore, [facility]  Specialty Pharmacy Clinic Liaison     [facility] Specialty    [email or web address]     Phone: [phone]  Fax: [phone]

## 2024-01-04 NOTE — TELEPHONE ENCOUNTER
Prior Authorization Approval    Medication: OTEZLA 30 MG PO TABS  Authorization Effective Date: 1/4/2024  Authorization Expiration Date: 1/3/2025  Approved Dose/Quantity: 60  Reference #: [id number]   Insurance Company: [facility] - Phone [phone] Fax [phone]  Expected CoPay: $    CoPay Card Available: No    Financial Assistance Needed: NA  Which Pharmacy is filling the prescription: [facility] - [place] [address] [address], [facility]  Specialty Pharmacy Clinic Liaison     [facility]    [email or web address]     Phone: [phone]  Fax: [phone]

## 2024-01-05 NOTE — TELEPHONE ENCOUNTER
PRIOR AUTHORIZATION DENIED    Medication: ENBREL SURECLICK 50 MG/ML SC SOAJ  Insurance Company: [employer] [place] - Phone [phone] Fax [phone]  Denial Date:  01/05/2024  Denial Reason(s):           Appeal Information:             Patient Notified: SOCO Ovalles, [facility]  Specialty Pharmacy Clinic Liaison     [facility] [place] [facility] Specialty    [email or web address]     Phone: [phone]  Fax: [phone]

## 2024-01-10 NOTE — ORAL ONC MGMT
Spoke to plan system is down advised to call back tomorrow       SOCO Gilmore, [facility]  Specialty Pharmacy Clinic Liaison     [facility] Specialty    [email or web address]     Phone: [phone]  Fax: [phone]

## 2024-01-12 NOTE — TELEPHONE ENCOUNTER
Spoke to plan appeal still pending -- not assigned closed Monday for holiday    APPEAL REF # [id number]    SOCO Gilmore, [facility]  Specialty Pharmacy Clinic Liaison     ealth [facility] Specialty    [email or web address]     Phone: [phone]  Fax: [phone]

## 2024-01-18 NOTE — TELEPHONE ENCOUNTER
Spoke to plan appeal still pending -- APPEAL REF # [id number]    They are still viewing for medical necessity has not returned yet       SOCO Gilmore, [facility]  Specialty Pharmacy Clinic Liaison     [facility] Specialty    [email or web address]     Phone: [phone]  Fax: [phone]    '

## 2024-01-19 ENCOUNTER — OFFICE VISIT (OUTPATIENT)
Dept: FAMILY MEDICINE | Facility: CLINIC | Age: 39
End: 2024-01-19
Payer: COMMERCIAL

## 2024-01-19 VITALS
SYSTOLIC BLOOD PRESSURE: 132 MMHG | DIASTOLIC BLOOD PRESSURE: 89 MMHG | HEIGHT: 65 IN | TEMPERATURE: 98.2 F | WEIGHT: 293 LBS | BODY MASS INDEX: 48.82 KG/M2 | RESPIRATION RATE: 18 BRPM | OXYGEN SATURATION: 99 % | HEART RATE: 93 BPM

## 2024-01-19 DIAGNOSIS — D84.89 OTHER IMMUNODEFICIENCIES (H): ICD-10-CM

## 2024-01-19 DIAGNOSIS — R10.11 RUQ ABDOMINAL PAIN: ICD-10-CM

## 2024-01-19 DIAGNOSIS — R10.2 PELVIC PAIN IN FEMALE: Primary | ICD-10-CM

## 2024-01-19 DIAGNOSIS — Z12.4 CERVICAL CANCER SCREENING: ICD-10-CM

## 2024-01-19 DIAGNOSIS — L40.50 PSORIATIC ARTHRITIS (H): ICD-10-CM

## 2024-01-19 DIAGNOSIS — E66.01 MORBID OBESITY (H): ICD-10-CM

## 2024-01-19 LAB
ALBUMIN SERPL BCG-MCNC: 4.1 G/DL (ref 3.5–5.2)
ALBUMIN UR-MCNC: NEGATIVE MG/DL
ALP SERPL-CCNC: 85 U/L (ref 40–150)
ALT SERPL W P-5'-P-CCNC: 21 U/L (ref 0–50)
ANION GAP SERPL CALCULATED.3IONS-SCNC: 9 MMOL/L (ref 7–15)
APPEARANCE UR: CLEAR
AST SERPL W P-5'-P-CCNC: 20 U/L (ref 0–45)
BACTERIA #/AREA URNS HPF: ABNORMAL /HPF
BILIRUB SERPL-MCNC: 0.2 MG/DL
BILIRUB UR QL STRIP: ABNORMAL
BUN SERPL-MCNC: 14.1 MG/DL (ref 6–20)
CALCIUM SERPL-MCNC: 9.1 MG/DL (ref 8.6–10)
CHLORIDE SERPL-SCNC: 106 MMOL/L (ref 98–107)
COLOR UR AUTO: YELLOW
CREAT SERPL-MCNC: 0.76 MG/DL (ref 0.51–0.95)
DEPRECATED HCO3 PLAS-SCNC: 26 MMOL/L (ref 22–29)
EGFRCR SERPLBLD CKD-EPI 2021: >90 ML/MIN/1.73M2
ERYTHROCYTE [DISTWIDTH] IN BLOOD BY AUTOMATED COUNT: 13 % (ref 10–15)
GLUCOSE SERPL-MCNC: 94 MG/DL (ref 70–99)
GLUCOSE UR STRIP-MCNC: NEGATIVE MG/DL
HCT VFR BLD AUTO: 40.1 % (ref 35–47)
HGB BLD-MCNC: 12.9 G/DL (ref 11.7–15.7)
HGB UR QL STRIP: ABNORMAL
KETONES UR STRIP-MCNC: NEGATIVE MG/DL
LEUKOCYTE ESTERASE UR QL STRIP: NEGATIVE
MCH RBC QN AUTO: 30.2 PG (ref 26.5–33)
MCHC RBC AUTO-ENTMCNC: 32.2 G/DL (ref 31.5–36.5)
MCV RBC AUTO: 94 FL (ref 78–100)
NITRATE UR QL: NEGATIVE
PH UR STRIP: 6 [PH] (ref 5–7)
PLATELET # BLD AUTO: 362 10E3/UL (ref 150–450)
POTASSIUM SERPL-SCNC: 4.3 MMOL/L (ref 3.4–5.3)
PROT SERPL-MCNC: 7.2 G/DL (ref 6.4–8.3)
RBC # BLD AUTO: 4.27 10E6/UL (ref 3.8–5.2)
RBC #/AREA URNS AUTO: ABNORMAL /HPF
SODIUM SERPL-SCNC: 141 MMOL/L (ref 135–145)
SP GR UR STRIP: >=1.03 (ref 1–1.03)
SQUAMOUS #/AREA URNS AUTO: ABNORMAL /LPF
UROBILINOGEN UR STRIP-ACNC: 0.2 E.U./DL
WBC # BLD AUTO: 11.2 10E3/UL (ref 4–11)
WBC #/AREA URNS AUTO: ABNORMAL /HPF

## 2024-01-19 PROCEDURE — 81001 URINALYSIS AUTO W/SCOPE: CPT | Performed by: FAMILY MEDICINE

## 2024-01-19 PROCEDURE — 99214 OFFICE O/P EST MOD 30 MIN: CPT | Performed by: FAMILY MEDICINE

## 2024-01-19 PROCEDURE — 85027 COMPLETE CBC AUTOMATED: CPT | Performed by: FAMILY MEDICINE

## 2024-01-19 PROCEDURE — 80053 COMPREHEN METABOLIC PANEL: CPT | Performed by: FAMILY MEDICINE

## 2024-01-19 PROCEDURE — 36415 COLL VENOUS BLD VENIPUNCTURE: CPT | Performed by: FAMILY MEDICINE

## 2024-01-19 ASSESSMENT — PAIN SCALES - GENERAL: PAINLEVEL: MODERATE PAIN (4)

## 2024-01-19 NOTE — PROGRESS NOTES
"  Assessment & Plan     Pelvic pain in female  Possible etiologies discussed - check labs, try adding fiber and monitor.  If not improved in 3 days, consider US  - CBC with platelets; Future  - UA Macroscopic with reflex to Microscopic and Culture - Lab Collect; Future  - Comprehensive metabolic panel (BMP + Alb, Alk Phos, ALT, AST, Total. Bili, TP); Future  - CBC with platelets  - UA Macroscopic with reflex to Microscopic and Culture - Lab Collect  - Comprehensive metabolic panel (BMP + Alb, Alk Phos, ALT, AST, Total. Bili, TP)  - UA Microscopic with Reflex to Culture    RUQ abdominal pain  As above  - Comprehensive metabolic panel (BMP + Alb, Alk Phos, ALT, AST, Total. Bili, TP); Future  - Comprehensive metabolic panel (BMP + Alb, Alk Phos, ALT, AST, Total. Bili, TP)    Cervical cancer screening  Recommended.    Other immunodeficiencies (H24)  On medication per rheumatology - continue to monitor    Psoriatic arthritis (H)  Manage by rheumatology    Morbid obesity (H)  Low carb diet recommended.        BMI  Estimated body mass index is 55.72 kg/m  as calculated from the following:    Height as of this encounter: 1.649 m (5' 4.92\").    Weight as of this encounter: 151.5 kg (334 lb).   Weight management plan: Discussed healthy diet and exercise guidelines        Marco Antonio Vargas is a 38 year old, presenting for the following health issues:  Abdominal Pain (Right lower quadrant)        1/19/2024     8:32 AM   Additional Questions   Roomed by Antonia         1/19/2024     8:32 AM   Patient Reported Additional Medications   Patient reports taking the following new medications none     History of Present Illness       Reason for visit:  Right lower and upper quadrant pain  Symptom onset:  1-3 days ago  Symptoms include:  Abdominal aching. Looser stools. Bloating. Decreased appetite.  Symptom intensity:  Moderate  Symptom progression:  Worsening  Had these symptoms before:  No  What makes it worse:  Sitting  What makes " "it better:  Standing or laying down    She eats 0-1 servings of fruits and vegetables daily.She consumes 0 sweetened beverage(s) daily.She exercises with enough effort to increase her heart rate 9 or less minutes per day.  She exercises with enough effort to increase her heart rate 3 or less days per week.   She is taking medications regularly.     Right pelvic pain for 2 days that is constant ache.  Last night started right upper abd that is an intermittent ache.  No sharp pain.  Having 3 - 5 looser stools.  Frequency is the same but consistent is different.  LMP started on Monday and mostly done.          Objective    /89 (BP Location: Left arm, Patient Position: Sitting, Cuff Size: Adult Large)   Pulse 93   Temp 98.2  F (36.8  C) (Oral)   Resp 18   Ht 1.649 m (5' 4.92\")   Wt (!) 151.5 kg (334 lb)   LMP 01/15/2024 (Exact Date)   SpO2 99%   BMI 55.72 kg/m    Body mass index is 55.72 kg/m .  Physical Exam   GENERAL: alert, no distress, and obese  NECK: no adenopathy, no asymmetry, masses, or scars  RESP: lungs clear to auscultation - no rales, rhonchi or wheezes  CV: regular rate and rhythm, normal S1 S2, no S3 or S4, no murmur, click or rub, no peripheral edema  ABDOMEN: tenderness RUQ and right suprapubic, no guarding or rebound, no organomegaly or masses, and bowel sounds normal  PSYCH: mentation appears normal, affect normal/bright            Signed Electronically by: Cierra Moss MD    "

## 2024-01-23 ENCOUNTER — MYC MEDICAL ADVICE (OUTPATIENT)
Dept: FAMILY MEDICINE | Facility: CLINIC | Age: 39
End: 2024-01-23
Payer: COMMERCIAL

## 2024-01-23 DIAGNOSIS — R10.2 PELVIC PAIN IN FEMALE: Primary | ICD-10-CM

## 2024-01-23 DIAGNOSIS — R10.11 RUQ ABDOMINAL PAIN: ICD-10-CM

## 2024-01-23 NOTE — RESULT ENCOUNTER NOTE
Ms. Smith,    Your liver function tests are normal.  Your blood sugar was in the normal range. Maintaining a healthy weight is benificial to good blood sugar control.  Your kidney function was normal.  Your white blood cell was slightly high.  Your urine test did not show any infection.    Please contact the clinic if you have additional questions.  Thank you.    Sincerely,    Cierra Moss MD

## 2024-01-23 NOTE — TELEPHONE ENCOUNTER
MEDICATION APPEAL APPROVED    Medication: ENBREL SURECLICK 50 MG/ML SC SOAJ  Authorization Effective Date: 1/4/2024  Authorization Expiration Date: 1/22/2025  Approved Dose/Quantity: 4mL  Reference #: [id number]   [employer] Company: [employer]  Expected CoPay: $       CoPay Card Available:    Financial Assistance Needed: na  Filling Pharmacy: [facility] - [place] [id number]  Patient Notified: yers  Comments:

## 2024-01-23 NOTE — TELEPHONE ENCOUNTER
Pt seen 1/19 d/t pelvic pain. Provider note states US if pain not improved within 3 days.     Routing to provider. See docplanner message.     Mary Kay Manley RN  
Calm

## 2024-02-01 ENCOUNTER — ANCILLARY PROCEDURE (OUTPATIENT)
Dept: CT IMAGING | Facility: CLINIC | Age: 39
End: 2024-02-01
Attending: FAMILY MEDICINE
Payer: COMMERCIAL

## 2024-02-01 DIAGNOSIS — R10.11 RUQ ABDOMINAL PAIN: ICD-10-CM

## 2024-02-01 DIAGNOSIS — R10.2 PELVIC PAIN IN FEMALE: ICD-10-CM

## 2024-02-01 PROCEDURE — 74177 CT ABD & PELVIS W/CONTRAST: CPT | Performed by: RADIOLOGY

## 2024-02-01 RX ORDER — IOPAMIDOL 755 MG/ML
100 INJECTION, SOLUTION INTRAVASCULAR ONCE
Status: COMPLETED | OUTPATIENT
Start: 2024-02-01 | End: 2024-02-01

## 2024-02-01 RX ADMIN — IOPAMIDOL 100 ML: 755 INJECTION, SOLUTION INTRAVASCULAR at 17:55

## 2024-02-07 ASSESSMENT — SLEEP AND FATIGUE QUESTIONNAIRES
HOW LIKELY ARE YOU TO NOD OFF OR FALL ASLEEP WHILE SITTING INACTIVE IN A PUBLIC PLACE: WOULD NEVER DOZE
HOW LIKELY ARE YOU TO NOD OFF OR FALL ASLEEP WHILE SITTING QUIETLY AFTER LUNCH WITHOUT ALCOHOL: SLIGHT CHANCE OF DOZING
HOW LIKELY ARE YOU TO NOD OFF OR FALL ASLEEP WHILE LYING DOWN TO REST IN THE AFTERNOON WHEN CIRCUMSTANCES PERMIT: MODERATE CHANCE OF DOZING
HOW LIKELY ARE YOU TO NOD OFF OR FALL ASLEEP WHILE SITTING AND READING: WOULD NEVER DOZE
HOW LIKELY ARE YOU TO NOD OFF OR FALL ASLEEP WHEN YOU ARE A PASSENGER IN A CAR FOR AN HOUR WITHOUT A BREAK: WOULD NEVER DOZE
HOW LIKELY ARE YOU TO NOD OFF OR FALL ASLEEP WHILE SITTING AND TALKING TO SOMEONE: WOULD NEVER DOZE
HOW LIKELY ARE YOU TO NOD OFF OR FALL ASLEEP WHILE WATCHING TV: SLIGHT CHANCE OF DOZING
HOW LIKELY ARE YOU TO NOD OFF OR FALL ASLEEP IN A CAR, WHILE STOPPED FOR A FEW MINUTES IN TRAFFIC: WOULD NEVER DOZE

## 2024-02-07 NOTE — PROGRESS NOTES
"Alicia is a 38 year old who is being evaluated via a billable video visit.      The patient has been notified of following:     \"This video visit will be conducted via a call between you and your physician/provider. We have found that certain health care needs can be provided without the need for an in-person physical exam.  This service lets us provide the care you need with a video conversation.  If a prescription is necessary we can send it directly to your pharmacy.  If lab work is needed we can place an order for that and you can then stop by our lab to have the test done at a later time.    Video visits are billed at different rates depending on your insurance coverage.  Please reach out to your insurance provider with any questions.    If during the course of the call the physician/provider feels a video visit is not appropriate, you will not be charged for this service.\"    Patient has given verbal consent for Video visit? Yes    How would you like to obtain your AVS? MyChart    If the video visit is dropped, the invitation should be resent by: Text to cell phone: [phone]    Will anyone else be joining your video visit? No    I    Video-Visit Details    Type of service:  Video Visit    Originating Location (pt. Location): Home    Distant Location (provider location):   Off-Site - Provider Home Office    Platform used for Video Visit: MiniTime    Video Start Time: 9:00    Video End Time:9:45        New Medical Weight Management Consult      PATIENT:  Alicia Smith  MRN:         [id number]  :         1985  JJ:         2024    Dear EVIE Harvey CNP,      I had the pleasure of seeing your patient, Alicia Smith. Full intake/assessment was done to determine barriers to weight loss success and develop a treatment plan. Alicia Smith is a 38 year old female interested in treatment of medical problems associated with excess weight. She has a height of 5' 5\"[pt reported[, a weight of 335 lbs " "0 oz, and the calculated Body mass index is 55.75 kg/m .    Overweight most of life. Been at current weight for the past 5 years. Diagnosed with psoriatic arthritis.   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  All questions about weight loss program were answered.    SURGICAL WEIGHT LOSS   Option presented given pt BMI and current comorbid conditions. No current interest.       MEDICATIONS:  We discussed healthy habits to assist with weight loss. We reviewed medications associated with weight gain. We discussed the role of pharmacological agents in the treatment of obesity and the \"off-label\" use of medications in this practice. We reviewed medication that may assist with weight loss. Indications, contraindications, risks/benefits, and potential side effects were discussed.  Will order labs before prescribing medications. If not a candidate for Wegovy patient will have option to start Zepbound or phentermine. Discussed that medications must always be used together with lifestyle changes such as improvements in diet choices, portion control and establishing and maintaining a regular exercise program.     AOM Considerations:  Phentermine:  Discussed and this can be option number 2.    Topiramate: options   GLP-1: ordered lab to determine eligibility. Zepbound might be option.      Naltrexone:    Wellbutrin: option   Metformin: options        After review of labs will get back with patient about next option        PATIENT INSTRUCTIONS:  See dietitian  Get lab drawn  One time weekly cardio - has elliptical at home.        Follow up: Return to clinic in 3 months      51 minutes " "spent on the date of the encounter doing chart review, review of test results, patient visit and documentation       She has the following co-morbidities:        2/7/2024     3:30 PM   --   I have the following health issues associated with obesity GERD (Reflux)    Fatty Liver   I have the following symptoms associated with obesity Knee Pain    Lower Extremity Swelling    Back Pain    Groin Rash    Hip Pain           2/7/2024     3:30 PM   Referring Provider   Please name the provider who referred you to Medical Weight Management  If you do not know, please answer \"I Don't Know\" Radha Gong           2/7/2024     3:30 PM   Weight History   How concerned are you about your weight? Very Concerned   I became overweight As a Child   The following factors have contributed to my weight gain Eating Wrong Types of Food    Eating Too Much    Lack of Exercise    Genetic (Runs in the Family)   I have tried the following methods to lose weight Watching Portions or Calories    Weight Watchers   My lowest weight since age 17 was 210   My highest weight since age 17 was 349   The most weight I have ever lost was (lbs) 15   I have the following family history of obesity/being overweight My mother is overweight    My father is overweight    One or more of my siblings are overweight    Many of my relatives are overweight   How has your weight changed over the last year? Lost   How many pounds? 2     Get home at 8:30 am from work. Will eat a oatmeal or cereal or bagel. Water. Goes to bed. Will eat dinner between 5-6 pm.  Protein and veggie - stir major or salad with a starch. Goes to work. Lunch at work around 2 am.  Typically leftovers from dinner.  Protein and starch. Has 2 snacks in addition to dinner during 12 hours.  Snacks: goldfish crackers or pretzels.    On days not working will flip back to normal and sleep at night up during the day. Maybe 2 meals and 1-2 snacks  Fluids: over 60 oz water.  1 soda daily. 1-2 cups of coffee " daily. No juices. One or less alcoholic beverages weekly         2/7/2024     3:30 PM   Diet Recall Review with Patient   If you do eat breakfast, what types of food do you eat? Toast, breakfast sandwich, cereal, cottage cheese with fruit, left overs from the pervious night   If you do eat lunch, what types of food do you typically eat? [place], left overs, soup   If you do eat supper, what types of food do you typically eat? Meat with sides like veggies and a starch   If you do snack, what types of food do you typically eat? Some thing crunchy like crackers, fruit, left overs, sweets   How many glasses of juice do you drink in a typical day? 0   How many of glasses of milk do you drink in a typical day? 0   How many 8oz glasses of sugar containing drinks such as Wilbert-Aid/sweet tea do you drink in a day? 0   How many cans/bottles of sugar pop/soda/tea/sports drinks do you drink in a day? 0   How many cans/bottles of diet pop/soda/tea or sports drink do you drink in a day? 0.25   How often do you have a drink of alcohol? 2-4 Times a Month   If you do drink, how many drinks might you have in a day? 1 or 2           2/7/2024     3:30 PM   Eating Habits   Generally, my meals include foods like these bread, pasta, rice, potatoes, corn, crackers, sweet dessert, pop, or juice Everyday   Generally, my meals include foods like these fried meats, brats, burgers, french fries, pizza, cheese, chips, or ice cream A Few Times a Week   Eat fast food (like McDonalds, Burger Eriberto, Taco Bell) Less Than Weekly   Eat at a buffet or sit-down restaurant A Few Times a Week   Eat most of my meals in front of the TV or computer Everyday   Often skip meals, eat at random times, have no regular eating times A Few Times a Week   Rarely sit down for a meal but snack or graze throughout Never   Eat extra snacks between meals Everyday   Eat most of my food at the end of the day Almost Everyday   Eat in the middle of the night or wake up at night  "to eat Never   Eat extra snacks to prevent or correct low blood sugar Never   Eat to prevent acid reflux or stomach pain Less Than Weekly   Worry about not having enough food to eat Never   I eat when I am depressed Less Than Weekly   I eat when I am stressed Less Than Weekly   I eat when I am bored Everyday   I eat when I am anxious Less Than Weekly   I eat when I am happy or as a reward A Few Times a Week   I feel hungry all the time even if I just have eaten Everyday   Feeling full is important to me Everyday   I finish all the food on my plate even if I am already full Everyday   I can't resist eating delicious food or walk past the good food/smell Everyday   I eat/snack without noticing that I am eating Almost Everyday   I eat when I am preparing the meal Less Than Weekly   I eat more than usual when I see others eating Once a Week   I have trouble not eating sweets, ice cream, cookies, or chips if they are around the house Almost Everyday   I think about food all day Everyday   What foods, if any, do you crave? Sweets/Candy/Chocolate   Please list any other foods you crave? fruit     Always snacking. Always wanting to eat. Going after the \"fullness\" feeling until uncomfortably full      2/7/2024     3:30 PM   Amount of Food   I feel out of control when eating Everyday   I eat a large amount of food, like a loaf of bread, a box of cookies, a pint/quart of ice cream, all at once Never   I eat a large amount of food even when I am not hungry Weekly   I eat rapidly Everyday   I eat alone because I feel embarrassed and do not want others to see how much I have eaten Monthly   I eat until I am uncomfortably full Almost Everyday   I feel bad, disgusted, or guilty after I overeat Less than 50% of the time           2/7/2024     3:30 PM   Activity/Exercise History   How much of a typical 12 hour day do you spend sitting? Half the Day   How much of a typical 12 hour day do you spend lying down? Less Than Half the Day "   How much of a typical day do you spend walking/standing? Most of the Day   How many hours (not including work) do you spend on the TV/Video Games/Computer/Tablet/Phone? 4-5 Hours   How many times a week are you active for the purpose of exercise? Never   What keeps you from being more active? Too tired    Other   How many total minutes do you spend doing some activity for the purpose of exercising when you exercise? None       PAST MEDICAL HISTORY:  Past Medical History:   Diagnosis Date    Acute pancreatitis 2016    GERD (gastroesophageal reflux disease)     Infective otitis externa, right 08/29/2018    Irritable bowel syndrome     Lumbar radiculopathy     Psoriasis     Vitamin D deficiency            2/7/2024     3:30 PM   Work/Social History Reviewed With Patient   My employment status is Full-Time   My job is Nurse   How much of your job is spent on the computer or phone? Less Than 50%   How many hours do you spend commuting to work daily? 1.75   What is your marital status? Single   Who do you live with? Brother   Who does the food shopping? Me and my brother       Social History     Tobacco Use    Smoking status: Never     Passive exposure: Never    Smokeless tobacco: Never   Substance Use Topics    Alcohol use: Yes     Comment: rarely    Drug use: No            2/7/2024     3:30 PM   Mental Health History Reviewed With Patient   Have you ever been physically or sexually abused? No   How often in the past 2 weeks have you felt little interest or pleasure in doing things? Not at all   Over the past 2 weeks how often have you felt down, depressed, or hopeless? Not at all           2/7/2024     3:30 PM   Sleep History Reviewed With Patient   How many hours do you sleep at night? 7       MEDICATIONS:   Current Outpatient Medications   Medication Sig Dispense Refill    acetaminophen (TYLENOL) 500 MG tablet       apremilast (OTEZLA) 30 MG tablet Take 1 tablet (30 mg) by mouth 2 times daily Hold for signs of  infection, and seek medical attention 180 tablet 3    Cholecalciferol (VITAMIN D) 2000 units tablet Take 1 tablet by mouth daily      etanercept (ENBREL SURECLICK) 50 MG/ML autoinjector Inject 50 mg Subcutaneous every 7 days 4 mL 11    ibuprofen (ADVIL/MOTRIN) 200 MG tablet       Loratadine (CLARITIN PO) Take  by mouth.      multivitamin w/minerals (THERA-VIT-M) tablet       omeprazole (PRILOSEC) 20 MG DR capsule Take 1 capsule (20 mg) by mouth 2 times daily 60 capsule 2       ALLERGIES:   Allergies   Allergen Reactions    Sulfasalazine Rash       ROS:  HEENT  H/O glaucoma:  no  Cardiovascular  CAD:   no  Palpitations:   no  HTN:    no  Gastrointestinal  GERD:   Yes - controlled with omeprazole 20 mg BID  Constipation:   no  Liver Dz:   Yes - fatty liver - levels are normal at this point  H/O Pancreatitis:  no  H/O Gallbladder Dz: no  Psychiatric  Moods Stable:  yes  Anxiety:   no  Depression:  no  Bipolar:  no  H/O ETOH/Drug Use: no  H/O eating disorder: no  Endocrine  PMH/FMH of MTC or MEN2:  no  Neurologic:  H/O seizures:   no  Headaches:  no  Memory Impairment:  no    H/O kidney stones:  no  Kidney disease:  no  Current birth control:  NO        LABS/RECORDS REVIEWED:  TSH   Date Value Ref Range Status   04/21/2021 3.19 0.30 - 5.00 uIU/mL Final     Sodium   Date Value Ref Range Status   01/19/2024 141 135 - 145 mmol/L Final     Comment:     Reference intervals for this test were updated on 09/26/2023 to more accurately reflect our healthy population. There may be differences in the flagging of prior results with similar values performed with this method. Interpretation of those prior results can be made in the context of the updated reference intervals.      Potassium   Date Value Ref Range Status   01/19/2024 4.3 3.4 - 5.3 mmol/L Final   04/21/2021 4.6 3.5 - 5.0 mmol/L Final     Chloride   Date Value Ref Range Status   01/19/2024 106 98 - 107 mmol/L Final   04/21/2021 105 98 - 107 mmol/L Final     Carbon  Dioxide (CO2)   Date Value Ref Range Status   01/19/2024 26 22 - 29 mmol/L Final   04/21/2021 22 22 - 31 mmol/L Final     Anion Gap   Date Value Ref Range Status   01/19/2024 9 7 - 15 mmol/L Final   04/21/2021 12 5 - 18 mmol/L Final     Glucose   Date Value Ref Range Status   01/19/2024 94 70 - 99 mg/dL Final   04/21/2021 73 70 - 125 mg/dL Final     Urea Nitrogen   Date Value Ref Range Status   01/19/2024 14.1 6.0 - 20.0 mg/dL Final   04/21/2021 15 8 - 22 mg/dL Final     Creatinine   Date Value Ref Range Status   01/19/2024 0.76 0.51 - 0.95 mg/dL Final   07/08/2021 0.70 0.52 - 1.04 mg/dL Final     GFR Estimate   Date Value Ref Range Status   01/19/2024 >90 >60 mL/min/1.73m2 Final   07/08/2021 >90 >60 mL/min/[1.73_m2] Final     Comment:     Non  GFR Calc  Starting 12/18/2018, serum creatinine based estimated GFR (eGFR) will be   calculated using the Chronic Kidney Disease Epidemiology Collaboration   (CKD-EPI) equation.       Calcium   Date Value Ref Range Status   01/19/2024 9.1 8.6 - 10.0 mg/dL Final     Bilirubin Total   Date Value Ref Range Status   01/19/2024 0.2 <=1.2 mg/dL Final     Alkaline Phosphatase   Date Value Ref Range Status   01/19/2024 85 40 - 150 U/L Final     Comment:     Reference intervals for this test were updated on 11/14/2023 to more accurately reflect our healthy population. There may be differences in the flagging of prior results with similar values performed with this method. Interpretation of those prior results can be made in the context of the updated reference intervals.     ALT   Date Value Ref Range Status   01/19/2024 21 0 - 50 U/L Final     Comment:     Reference intervals for this test were updated on 6/12/2023 to more accurately reflect our healthy population. There may be differences in the flagging of prior results with similar values performed with this method. Interpretation of those prior results can be made in the context of the updated reference intervals.  "    07/08/2021 26 0 - 50 U/L Final     AST   Date Value Ref Range Status   01/19/2024 20 0 - 45 U/L Final     Comment:     Reference intervals for this test were updated on 6/12/2023 to more accurately reflect our healthy population. There may be differences in the flagging of prior results with similar values performed with this method. Interpretation of those prior results can be made in the context of the updated reference intervals.   07/08/2021 12 0 - 45 U/L Final     Cholesterol   Date Value Ref Range Status   04/20/2018 197 <=199 mg/dL Final     Direct Measure HDL   Date Value Ref Range Status   04/20/2018 55 >=50 mg/dL Final     LDL Cholesterol Calculated   Date Value Ref Range Status   04/20/2018 119 <=129 mg/dL Final     Triglycerides   Date Value Ref Range Status   04/20/2018 115 <=149 mg/dL Final     WBC   Date Value Ref Range Status   07/08/2021 11.3 (H) 4.0 - 11.0 10e9/L Final     WBC Count   Date Value Ref Range Status   01/19/2024 11.2 (H) 4.0 - 11.0 10e3/uL Final     Hemoglobin   Date Value Ref Range Status   01/19/2024 12.9 11.7 - 15.7 g/dL Final   07/08/2021 13.0 11.7 - 15.7 g/dL Final     Hematocrit   Date Value Ref Range Status   01/19/2024 40.1 35.0 - 47.0 % Final   07/08/2021 39.2 35.0 - 47.0 % Final     MCV   Date Value Ref Range Status   01/19/2024 94 78 - 100 fL Final   07/08/2021 93 78 - 100 fl Final     Platelet Count   Date Value Ref Range Status   01/19/2024 362 150 - 450 10e3/uL Final   07/08/2021 413 150 - 450 10e9/L Final         BP Readings from Last 6 Encounters:   01/19/24 132/89   12/14/23 124/82   08/10/23 110/78   03/23/23 124/88   03/09/23 110/81   02/10/23 (!) 137/92       Pulse Readings from Last 6 Encounters:   01/19/24 93   12/14/23 78   08/10/23 68   03/23/23 95   03/09/23 70   02/10/23 80       PHYSICAL EXAM:  Ht 5' 5\" (1.651 m)   Wt (!) 335 lb (152 kg)   LMP 01/15/2024 (Exact Date)   BMI 55.75 kg/m    GENERAL: Healthy, alert and no distress  EYES: Eyes grossly normal " to inspection.  No discharge or erythema, or obvious scleral/conjunctival abnormalities.  RESP: No audible wheeze, cough, or visible cyanosis.  No visible retractions or increased work of breathing.    SKIN: Visible skin clear. No significant rash, abnormal pigmentation or lesions.  NEURO: Cranial nerves grossly intact.  Mentation and speech appropriate for age.  PSYCH: Mentation appears normal, affect normal/bright, judgement and insight intact, normal speech and appearance well-groomed.    COUNSELING:   Reviewed obesity as a chronic disease and comprehensive management stratagies.      We discussed Bariatric Basics including:  -eating 3 meals daily  -eating protein first  -eating slowly, chewing food well  -avoiding/limiting calorie containing beverages  -limiting carbohydrates and changing to whole grains  -limiting restaurant or cafeteria eating to twice a week or less    We discussed the importance of restorative sleep and stress management in maintaining a healthy weight.  We discussed insulin resistance and glycemic index as it relates to appetite and weight control.   We discussed the importance of physical activity including cardiovascular and strength training in maintaining a healthier weight and explored viable options.  Patient education of above written in AVS.      Sincerely,    Leroy Sheth PA-C

## 2024-02-08 ENCOUNTER — VIRTUAL VISIT (OUTPATIENT)
Dept: SURGERY | Facility: CLINIC | Age: 39
End: 2024-02-08
Attending: NURSE PRACTITIONER
Payer: COMMERCIAL

## 2024-02-08 ENCOUNTER — VIRTUAL VISIT (OUTPATIENT)
Dept: SURGERY | Facility: CLINIC | Age: 39
End: 2024-02-08
Payer: COMMERCIAL

## 2024-02-08 VITALS — HEIGHT: 65 IN | WEIGHT: 293 LBS | BODY MASS INDEX: 48.82 KG/M2

## 2024-02-08 DIAGNOSIS — E66.01 MORBID OBESITY (H): Primary | ICD-10-CM

## 2024-02-08 DIAGNOSIS — K21.9 GERD WITHOUT ESOPHAGITIS: ICD-10-CM

## 2024-02-08 DIAGNOSIS — M17.11 PRIMARY OSTEOARTHRITIS OF RIGHT KNEE: ICD-10-CM

## 2024-02-08 PROCEDURE — 97802 MEDICAL NUTRITION INDIV IN: CPT | Mod: 95

## 2024-02-08 PROCEDURE — 99204 OFFICE O/P NEW MOD 45 MIN: CPT | Mod: 95 | Performed by: PHYSICIAN ASSISTANT

## 2024-02-08 NOTE — PROGRESS NOTES
"Alicia is a 38 year old who is being evaluated via a billable video visit.      The patient has been notified of following:     \"This video visit will be conducted via a call between you and your physician/provider. We have found that certain health care needs can be provided without the need for an in-person physical exam.  This service lets us provide the care you need with a video conversation.  If a prescription is necessary we can send it directly to your pharmacy.  If lab work is needed we can place an order for that and you can then stop by our lab to have the test done at a later time.    Video visits are billed at different rates depending on your insurance coverage.  Please reach out to your insurance provider with any questions.    If during the course of the call the physician/provider feels a video visit is not appropriate, you will not be charged for this service.\"    Patient has given verbal consent for Video visit? Yes    How would you like to obtain your AVS? MyChart    If the video visit is dropped, the invitation should be resent by: Text to cell phone: [phone]    Will anyone else be joining your video visit? No    I    Video-Visit Details    Type of service:  Video Visit    Originating Location (pt. Location): Home    Distant Location (provider location):   Other:  [facility]     Platform used for Video Visit: [employer]    Video Start Time:  9:59  Video End Time:10:28 AM     MEDICAL WEIGHT LOSS INITIAL EVALUATION  DIAGNOSIS:  Obese, class III     NUTRITION HISTORY:  Breakfast: hash browns (likes) oatmeal; cereal; bagel (8:30 AM)   Sleeps   Second meal: 5:00 -6:00 stir major; salad with starch; protein and vegetable meal planning on the weekend   Snacks: fruit; goldfish crackers or pretzels at work; nuts  Third meal: leftovers 1-2 AM   Beverage choices: 60 oz water; soda; coffee; alcohol 1 day per week or less   Dining out: varies   Binge eating: no  Emotional eating: " "no  Night time eating: no  Exercise: Patient does not have an exercise regimen.   Additional Information: Patient works 7:30 PM-7:30 AM.  Patient works as RN at [employer].  Patient lives with her brother and they meal plan.  When patient doesn't work flips back to day schedule.      Patient feels her biggest struggle with food is that she like to eat and enjoys all kinds of foods. Patient snacks within 1-2 hours of eating as likes to feel full.  Patient eats meals in 10 minutes.      ANTHROPOMETRICS:  Height: 5'5\"   Weight: 335 lbs patient reported   BMI:  55.75 kg/m2  NUTRITION DIAGNOSIS:   Obese class III related to overeating and poor lifestyle habits as evidence by patient's subjective statements and  BMI of 55.7 kg/m2   NUTRITION INTERVENTIONS  Nutrition Prescription:  Recommend modified energy- nutrient intake  Implementation:  Nutrition Education (Content):  Discussed portion sizes, levels of fullness, behavior change, My Plate     Reviewed healthy snacking and beverage choices  Provided: Tips for Weight Loss and Weight Management, My Plate; Protein Sources for Weight Loss; Volumetrics     Nutrition Education (Application):   Patient to practice goals as stated below  Patient verbalizes understanding of diet by stating will switch plate sizes   Expected patient engagement: good     Goals:  Switch to smaller plate  Reduce snacking by 1 per day     FOLLOW UP AND MONITORING:   Other  - follow up in 6 weeks.     TIME SPENT WITH PATIENT:  29 minutes   Katerine Burden, RD, LD  [facility] Diet[facility]   [phone] (office phone)   "

## 2024-02-08 NOTE — PATIENT INSTRUCTIONS
"Nice to talk with you today! Thank you for allowing me the privilege of caring for you.   We hope we provided you with the excellent service you deserve.     To ensure the quality of our services you may receive a patient satisfaction survey from an independent monitoring company.  The greatest compliment you can give is \"Likely to Recommend\"      Below is our plan we discussed.-  ALISA Harper      PATIENT INSTRUCTIONS:  See dietitian  Get lab drawn  One time weekly cardio - has elliptical at home.      Please call [phone] and schedule a follow up with Leroy Sheth PA-C in 3 and 6 months.  If you need to reach me sooner you can do so by calling [phone].  If you decide to do a follow up virtual visit please do a nurse only visit to get weight, blood pressure and pulse about a week before the visit.    Have a great day!                               Protein Sources  Protein Sources for Weight Loss       Zepbound (Tirzepatide)    Zepbound (Tirzepatide): is an injectable prescription medicine recently FDA approved for adults with obesity or overweight with an additional condition affected by their weight.      It is given as a shot once a week. It activates the body's receptors for GIP (glucose-dependent insulinotropic polypeptide) and GLP-1 (glucagon-like peptide-1) receptor, two naturally occurring hormones that help tell the brain that you are full. It also works is by slowing down how quickly food leaves your stomach.     You will start to feel barroso more quickly, notice portion changes and eat less often between meals.  Patients are advised to eat slowly and purposefully. Give yourself less portions. You may find after starting this medication you have a new point of fullness. Maintain consistent eating schedule with meals +/- snacks and get in good hydration.    Dosing:  Initial dose: 2.5 mg injected subcutaneously once weekly for 4 weeks  Further dose changes can include: increase to 5 mg once weekly " for 4 weeks, then 7.5 mg once weekly for 4 weeks, then 10 mg once weekly for 4 weeks then 12.5 mg once weekly for 4 weeks, then 15 mg (maximum dose) once weekly.    Dose changes are based on how you are tolerating the current dose, what benefits you are seeing at the current dose and if improvement in effectiveness of the drug is needed. The goal is to find the dose that works best for you with the least amount of side effects. You may find you reach this at a lower dose than the maximum dose.     Side effects: Common side effects include nausea, Heartburn,diarrhea, constipation. This is worse when starting the medication and with dose dose escalation.  It does improve with time. Stay adequately hydrated and eat small portions to decrease the risk of side effects.     The risk of pancreatitis (inflammation of the pancreas) has been associated with this type of medication, but is very rare.  If you have had pancreatitis in the past, this medication may not be for you. If you experience persistent severe abdominal pain (sometimes radiating to the back potentially accompanied by vomiting and have a fever), stop the medication and contact your provider immediately for assessment. They will do a blood test to check for pancreatitis.       Caution:   Tirzepatide (Zepbound, Mounjaro) May decrease effectiveness of your oral birth control while starting and with dose adjustments.  Use backup forms of birth control if necessary.      For any questions or concerns please send a [employer] message to our team or call our weight management call center at [phone] during regular business hours.     .  Here is some information about the medication we discussed.  Please have your blood pressure and pulse checked 7-10 days after starting and let clinic know if your blood pressure goes above 140/90 or pulse greater than 100 bpm!        MEDICATION STARTED AT THIS APPOINTMENT    We are starting Phentermine. Take one tablet in the  morning. Contact the nurse via [employer] or call [phone] if you have any questions or concerns. (Do not stop taking it if you don't think it's working. For some people it works without them knowing it.)    Phentermine is being prescribed because you identified hunger as one of the main causes for your extra weight.      Our patients on Phentermine find that they:    >feel less hunger    >find it easier to push the plate away   >have an easier time eating less    For some of our patients, these feelings are very real and immediate. For other patients, the feelings are less obvious. They don't feel much of a change but find they've lost weight. Like all weight loss medications, Phentermine  works best when you help it work. This means:  1. Having less tempting high calorie (fattening) food around the house or office. (For people with strong cravings this is very important.)   2. Staying away from situations or people that may trigger your cravings .   3. Eating out only one time or less each week.  4. Eating your meals at a table with the TV or computer off.    Side-effects. Phentermine is generally well tolerated. The main side-effects we see are feelings of racing pulse or rapid heart beat. Some people can get an elevated blood pressure. Because of this we may have you come back within a week or so of starting the medication for a blood pressure check.         In order to get refills of this or any medication we prescribe you must be seen in the medical weight mgmt clinic every 2-3 months.

## 2024-02-09 ENCOUNTER — LAB (OUTPATIENT)
Dept: LAB | Facility: CLINIC | Age: 39
End: 2024-02-09
Payer: COMMERCIAL

## 2024-02-09 DIAGNOSIS — E66.01 MORBID OBESITY (H): ICD-10-CM

## 2024-02-09 LAB — HBA1C MFR BLD: 5.3 % (ref 0–5.6)

## 2024-02-09 PROCEDURE — 36415 COLL VENOUS BLD VENIPUNCTURE: CPT

## 2024-02-09 PROCEDURE — 83036 HEMOGLOBIN GLYCOSYLATED A1C: CPT

## 2024-02-09 NOTE — PATIENT INSTRUCTIONS
Sonny Vargas,    It was nice meeting you today.  Here are the goals you determined:    Switch plate size to salad plate  Reduce snacking by 1 time per day    My Plate    Protein Sources for Weight Loss     Summary of the Volumetrics Eating Plan     Tips for Weight Loss and Weight Management    Please call [phone] to schedule your next RD appointment in 6-8 weeks.    Have a great rest of your week!    Take care,    Katerine Henry, RD, LD  [facility] Outpatient Dietitian/Weight Loss [facility]   [phone] (office phone)

## 2024-02-14 DIAGNOSIS — E66.01 MORBID OBESITY (H): Primary | ICD-10-CM

## 2024-02-15 ENCOUNTER — TELEPHONE (OUTPATIENT)
Dept: SURGERY | Facility: CLINIC | Age: 39
End: 2024-02-15
Payer: COMMERCIAL

## 2024-02-15 NOTE — TELEPHONE ENCOUNTER
Prior Authorization Retail Medication Request    Medication/Dose: tirzepatide-Weight Management (ZEPBOUND) 5 MG/0.5ML prefilled pen, tirzepatide-Weight Management (ZEPBOUND) 2.5 MG/0.5ML prefilled pen  Diagnosis and ICD code (if different than what is on RX):  E66.01  New/renewal/insurance change PA/secondary ins. PA: NEW  Previously Tried and Failed:  diet, lifestyle  Rationale:  weight management    Insurance   Primary: [employer] [place]   Insurance ID:  [id number]     Pharmacy Information (if different than what is on RX)  Name:  [id number] [address] AT [facility]  [address]  Phone:  [phone]   Fax:[phone]

## 2024-02-28 NOTE — TELEPHONE ENCOUNTER
PA Initiation    Medication: ZEPBOUND 2.5 MG/0.5ML SC SOAJ  Insurance Company: [employer] [place] - Phone [phone] Fax [phone]  Pharmacy Filling the Rx: [id number] - [place], [place] - [address] AT [facility]  [address]  Filling Pharmacy Phone: [phone]  Filling Pharmacy Fax:    Start Date: 2/28/2024

## 2024-03-04 NOTE — TELEPHONE ENCOUNTER
PRIOR AUTHORIZATION DENIED    Medication: ZEPBOUND 2.5 MG/0.5ML SC SOAJ  Insurance Company: [employer] [place] - Phone [phone] Fax [phone]  Denial Date: 3/4/2024  Denial Reason(s): Needs proof of being on a weight loss program      Appeal Information:   Patient Notified: No

## 2024-03-04 NOTE — TELEPHONE ENCOUNTER
Patient might be using manufacture coupon.  Will send [employer] message to clarify.    Juanis JOSEPH RN

## 2024-03-08 ENCOUNTER — VIRTUAL VISIT (OUTPATIENT)
Dept: SURGERY | Facility: CLINIC | Age: 39
End: 2024-03-08
Payer: COMMERCIAL

## 2024-03-08 VITALS — BODY MASS INDEX: 53.25 KG/M2 | WEIGHT: 293 LBS

## 2024-03-08 DIAGNOSIS — E66.01 MORBID OBESITY (H): Primary | ICD-10-CM

## 2024-03-08 PROCEDURE — 97803 MED NUTRITION INDIV SUBSEQ: CPT | Mod: 95

## 2024-03-08 NOTE — PATIENT INSTRUCTIONS
Sonny Vargas,    Congratulations on your weight loss!  Here are the goals we discussed:     Increase exercise to 2 times per week  Add fruit between meals 1-2 times per day   Add carbohydrate at meals  Start calcium supplement daily    Please call [phone] to schedule your next RD appointment in 6-8 weeks.  If you have any questions, please feel free to contact me.  Have a great weekend!    Thanks,    Katerine Henry, RD, LD  [facility] Outpatient Dietitian/Weight Loss Clinic   [phone] (office phone)

## 2024-03-08 NOTE — PROGRESS NOTES
"Alicia is a 38 year old who is being evaluated via a billable video visit.      The patient has been notified of following:     \"This video visit will be conducted via a call between you and your physician/provider. We have found that certain health care needs can be provided without the need for an in-person physical exam.  This service lets us provide the care you need with a video conversation.  If a prescription is necessary we can send it directly to your pharmacy.  If lab work is needed we can place an order for that and you can then stop by our lab to have the test done at a later time.    Video visits are billed at different rates depending on your insurance coverage.  Please reach out to your insurance provider with any questions.    If during the course of the call the physician/provider feels a video visit is not appropriate, you will not be charged for this service.\"    Patient has given verbal consent for Video visit? Yes    How would you like to obtain your AVS? MyChart    If the video visit is dropped, the invitation should be resent by: Text to cell phone: [phone]    Will anyone else be joining your video visit? No    I    Video-Visit Details    Type of service:  Video Visit    Originating Location (pt. Location): Home    Distant Location (provider location):   Other:  [facility]    Platform used for Video Visit: [employer]    Video Start Time:  8:56    Video End Time: 9:09      MEDICAL WEIGHT LOSS FOLLOW UP    DIAGNOSIS:  Obese, class III     NUTRITION HISTORY:  Breakfast: hash browns (likes) oatmeal; cereal; bagel (8:30 AM)  Sleeps   Second meal: 5:00 -6:00 stir major; salad with starch; protein and vegetable meal planning on the weekend; vegetable taco bowl-veggies and ground beef   Snacks: fruit; goldfish crackers or pretzels at work; nuts  Third meal: leftovers 1-2 AM  1/2 pork chop (3-4 oz) and vegetables (1/2 cup)  Beverage choices: 50-60 oz water; soda-carbonation does not taste good since " "starting Zepbound; coffee; alcohol 1 day per week or less  Dining out: varies   Binge eating: no  Emotional eating: no  Night time eating: no  Exercise: Patient uses elliptical 1 time per week.     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  When patient doesn't work flips back to day schedule.     Patient feels her biggest struggle with food is that she like to eat and enjoys all kinds of foods. Since starting Zepbound, patient does not feel hungry and had nausea day 5-12 so had limited eating.  Patient previously would snacks within 1-2 hours of eating as likes to feel full.  Patient eats meals in 10 minutes.  Patient takes multivitamin.     ANTHROPOMETRICS:  Height: 5'5\"   Weight: 335 lbs patient reported   Current weight: 320 lbs reported   Weight Change: 15 lb decrease   BMI:  53.2 kg/m2    MEDICATION FOR WEIGHT LOSS:  Zepbound -lowest dose (has been taking for 3 weeks)     EVALUATION/PROGRESS TOWARDS GOALS:  Previous Goals:  Switch to smaller plate-met  Reduce snacking by 1 per day-met    Previous Nutrition Diagnosis:  Obese class III related to overeating and poor lifestyle habits as evidence by patient's subjective statements and  BMI of 55.7 kg/m2-no change      Current Nutrition Diagnosis:   Obese class III related to overeating and poor lifestyle habits as evidence by patient's subjective statements and  BMI of 53.2 kg/m2    INTERVENTION:    Nutrition Prescription:  Recommend modified nutrient intake by decreasing energy intake    Implementation:    Meals and Snacks: 3 meals + snacks as able     Nutrition Education (Content):  Discussed previous goals and determined new goals  Encouraged physical activity  Supported patient in attempted weight loss and behavior changes  Congratulated patient on successful weight loss   Patient verbalizes understanding of weight management by stating " will add carbohydrate at meal   Expected patient engagement: good     Goals:  Increase exercise to 2 times per week  Add fruit between meals 1-2 times per day   Add carbohydrate at meals  Start calcium supplement daily    Follow Up/Monitoring:  Other  -  patient to follow up in 6-8 weeks    Time Spent With Patient:  13 Minutes    Katerine Burden, RD, LD  [facility] Outpatient Dietitian/Weight Loss Clinic   [phone] (office phone)

## 2024-03-15 DIAGNOSIS — E66.01 MORBID OBESITY (H): ICD-10-CM

## 2024-03-26 ENCOUNTER — LAB (OUTPATIENT)
Dept: LAB | Facility: CLINIC | Age: 39
End: 2024-03-26
Payer: COMMERCIAL

## 2024-03-26 DIAGNOSIS — L40.50 PSORIATIC ARTHRITIS (H): ICD-10-CM

## 2024-03-26 DIAGNOSIS — L40.9 PSORIASIS: ICD-10-CM

## 2024-03-26 LAB
ERYTHROCYTE [DISTWIDTH] IN BLOOD BY AUTOMATED COUNT: 12.9 % (ref 10–15)
ERYTHROCYTE [SEDIMENTATION RATE] IN BLOOD BY WESTERGREN METHOD: 16 MM/HR (ref 0–20)
HCT VFR BLD AUTO: 41.6 % (ref 35–47)
HGB BLD-MCNC: 13.8 G/DL (ref 11.7–15.7)
MCH RBC QN AUTO: 30.1 PG (ref 26.5–33)
MCHC RBC AUTO-ENTMCNC: 33.2 G/DL (ref 31.5–36.5)
MCV RBC AUTO: 91 FL (ref 78–100)
PLATELET # BLD AUTO: 352 10E3/UL (ref 150–450)
RBC # BLD AUTO: 4.58 10E6/UL (ref 3.8–5.2)
WBC # BLD AUTO: 9.4 10E3/UL (ref 4–11)

## 2024-03-26 PROCEDURE — 85652 RBC SED RATE AUTOMATED: CPT

## 2024-03-26 PROCEDURE — 36415 COLL VENOUS BLD VENIPUNCTURE: CPT

## 2024-03-26 PROCEDURE — 85027 COMPLETE CBC AUTOMATED: CPT

## 2024-03-26 PROCEDURE — 82040 ASSAY OF SERUM ALBUMIN: CPT

## 2024-03-26 PROCEDURE — 82565 ASSAY OF CREATININE: CPT

## 2024-03-26 PROCEDURE — 86140 C-REACTIVE PROTEIN: CPT

## 2024-03-26 PROCEDURE — 84460 ALANINE AMINO (ALT) (SGPT): CPT

## 2024-03-26 PROCEDURE — 84450 TRANSFERASE (AST) (SGOT): CPT

## 2024-03-27 LAB
ALBUMIN SERPL BCG-MCNC: 4.2 G/DL (ref 3.5–5.2)
ALT SERPL W P-5'-P-CCNC: 25 U/L (ref 0–50)
AST SERPL W P-5'-P-CCNC: 23 U/L (ref 0–45)
CREAT SERPL-MCNC: 0.73 MG/DL (ref 0.51–0.95)
CRP SERPL-MCNC: 11.9 MG/L
EGFRCR SERPLBLD CKD-EPI 2021: >90 ML/MIN/1.73M2

## 2024-04-03 ENCOUNTER — OFFICE VISIT (OUTPATIENT)
Dept: RHEUMATOLOGY | Facility: CLINIC | Age: 39
End: 2024-04-03
Payer: COMMERCIAL

## 2024-04-03 VITALS
SYSTOLIC BLOOD PRESSURE: 122 MMHG | TEMPERATURE: 97.9 F | OXYGEN SATURATION: 100 % | DIASTOLIC BLOOD PRESSURE: 86 MMHG | WEIGHT: 293 LBS | BODY MASS INDEX: 52.25 KG/M2 | HEART RATE: 97 BPM

## 2024-04-03 DIAGNOSIS — R21 RASH: Primary | ICD-10-CM

## 2024-04-03 DIAGNOSIS — L40.50 PSORIATIC ARTHRITIS (H): ICD-10-CM

## 2024-04-03 PROCEDURE — 99214 OFFICE O/P EST MOD 30 MIN: CPT | Performed by: NURSE PRACTITIONER

## 2024-04-03 NOTE — PATIENT INSTRUCTIONS
1) Labs every 3 months    2) Continue Enbrel 50 mg weekly, apremilast 30 mg twice a day     3) See me back in 5 months, sooner with questions     4) If rash doesn't improve or worsen see primary    5) See MTPATRICIA/ Jacoby to discuss if possible sun sensitivity common with new med

## 2024-04-03 NOTE — NURSING NOTE
"Alicia Smith's goals for this visit include:   Chief Complaint   Patient presents with    RECHECK      Psoriatic arthritis        PCP: Rosa Bentley    Referring Provider:  No referring provider defined for this encounter.      Initial /86 (BP Location: Left arm, Patient Position: Sitting, Cuff Size: Adult Large)   Pulse 97   Temp 97.9  F (36.6  C) (Oral)   Wt 142.4 kg (314 lb)   LMP 03/15/2024 (Approximate)   SpO2 100%   Breastfeeding No   BMI 52.25 kg/m   Estimated body mass index is 52.25 kg/m  as calculated from the following:    Height as of 2/8/24: 1.651 m (5' 5\").    Weight as of this encounter: 142.4 kg (314 lb).    Medication Reconciliation: complete    Do you need any medication refills at today's visit? none    SUZETTE Banks  Rheumatology/Infectious disease  [facility]   [phone]      "

## 2024-04-03 NOTE — PROGRESS NOTES
"    Rheumatology Clinic Visit  Radha Gong, CNP      Alicia Smith  YOB: 1985    Age: 38 year old   MRN# [id number]       Date of Visit: 04/03/2024  Primary care provider: Rosa Bentley              Subjective:     Alicia is a 38 year old female presents today for follow-up for PSA/PSO (dx 2020). Current treatment: Enbrel 50 mg weekly (8/23) , Otezla 30 mg BID (8/21)    4/03/2024:    PSA:  Hands are 90 % improved on Enbrel, R knee is 95% improved. Doing well.     PSO: Improved.   Wgt: Saw med management started SepBound.    ROS: No infections fevers. Was in [place] in sun, 1 week after coming home woke up with pinpoint/papular coalesced pink rash on sun exposed areas only face and chest/ neck. Itchy. Cortisone cream helping. This started yesterday.     HPI:  Pt had thrombocytosis and neutrophilia and found to be likely related to inflammatory process, was having joint pain and rash and found to have PSA/PSO.    LOV 7/5/2022 with Dr. Alejandro:  \"Psoriatic arthritis: Pain in her joints started in 2020 in her right leg. Pain radiated from right groin into the thigh.  MRI lumbar spine showed mild DDD. She had bilateral hip injection which helped for a week. In 4/2021 she noticed sausage like swelling of her right third, fourth toes.  She had pain and tenderness over left wrist, right ankle and right knee and swelling over her wrist and ankles.  Also had numbness in the left thumb, index and middle finger. She has hx of Psoriasis.      Her clinical history, physical exam is consistent with psoriatic arthritis.  She has dactylitis, enthesitis in the form of plantar fasciitis.  She has synovitis in left wrist, bilateral ankles.     She has negative rheumatoid factor, anti-CCP, HLA-B27 genotype.  X-ray of bilateral hand, wrist, feet showed no erosive disease. She was given prednisone taper and responded very well.  Otezla was started in 8/2021 for psoriatic arthritis.  Denies any side effects.  It " "cleared psoriasis, it is 70% better.  SSZ was added due to persistent joint pains in 1/2022 but developed skin rash due to it. Later Humira was started in 2/2022. She has noted marked improvement on Humira.       Will continue Humira 40 mg subcutaneous once every 2 weeks.      Psoriasis: Psoriasis is better on Otezla and Humira.    Lumbar degenerative disc disease: She also has lumbar degenerative disc disease.  Reports chronic low back pain and follows with orthopedic.   Right knee Osteoarthritis : She has OA in her right knee and is seeing [facility] for it. She received intra articular steroid injection in her right knee in 2/2022 which helped.    Morbid obesity: She is morbidly obese.  Once overall inflammation is under control she should focus on weight loss, physical exercise, pool therapy which will help her long-term.\"    Thrombocytosis and neutrophilia work up 5/25/2021 LOV with Dr. Szymanski  \"I discussed with her that I suspect that the elevated white blood cell count is due to neutrophils and the mildly elevated platelet count is most likely reactive to some sort of inflammatory condition going on.  It is quite possible that this is connected to the joint pains that she is having.  I wonder whether she has a seronegative arthritis like psoriatic arthritis underlying.  We will know when she has the rheumatology work-up.     Meanwhile we will go for a work-up to make sure that she does not have an underlying myeloproliferative disorder that is responsible for the neutrophilia and thrombocytosis.  She was agreeable to doing a systematic work-up.  If an underlying hematological disorder is ruled out, then she need not worry about that again.   Today we will obtain the following labs: Ferritin, TIBC panel, BCR/ABL qualitative PCR, JAK2 mutation with reflex to MPL and PERNELL R mutations.   I discussed with her that once I have the results of the gene tests available, I will give her a call.  I asked her to " "expect a call in about 2 weeks time.\"      Vaccinations: Vaccinations reviewed with MsMunir Smith. Risks and benefits of vaccinations were discussed.  - Influenza: encouraged yearly vaccination. Has 2022  - Lwegglq14: prescription sent  - Uooztramz14: to receive 8 weeks after kqexcmi40 is administered  - Zostavax: after age 50  - COVID : 12/21/21, 2/5/21, 1/15/21, 10/11/2022    Past Rheum tx:  SSZ-rash  Humira 40 mg every 2 weeks (2/22)-developed drug tequila and no drug level    Social History  [place]  Nurse-Ortho-surg RN at [employer]   No tobacco  ETOH rare[place]  No exercise  Read  [place], travel   No children, not intending pg.     FMH:  NO AI dz.     Active Problem list:  Patient Active Problem List    Diagnosis Date Noted    Other immunodeficiencies (H24) 03/23/2023     Priority: Medium    Irritable bowel syndrome with both constipation and diarrhea 12/29/2022     Priority: Medium    Nonspecific elevation of levels of transaminase or lactic acid dehydrogenase (LDH) 12/29/2022     Priority: Medium     Formatting of this note might be different from the original.  Created by Conversion      Seasonal allergies 12/29/2022     Priority: Medium    Psoriatic arthritis (H) 11/07/2022     Priority: Medium    Psoriasis 11/07/2022     Priority: Medium    DDD (degenerative disc disease), lumbar 11/07/2022     Priority: Medium    Primary osteoarthritis of right knee 11/07/2022     Priority: Medium    Morbid obesity (H) 07/08/2021     Priority: Medium    Neutrophilia 05/25/2021     Priority: Medium    Thrombocytosis 05/25/2021     Priority: Medium    Vitamin D deficiency 04/23/2018     Priority: Medium            Objective:     Physical Exam  /86 (BP Location: Left arm, Patient Position: Sitting, Cuff Size: Adult Large)   Pulse 97   Temp 97.9  F (36.6  C) (Oral)   Wt 142.4 kg (314 lb)   LMP 03/15/2024 (Approximate)   SpO2 100%   Breastfeeding No   BMI 52.25 kg/m    Body mass index is " 52.25 kg/m .    Constitutional: lg body habitus with out gross deformities. Well groomed.   Psych: nl judgement, orientation, memory, affect.  Eyes: wnl EOM, , vision, conjunctiva, sclera   Resp: l nl work of breathing    Skin: no nail pitting, alopecia. inpoint/papular coalesced pink rash on sun exposed areas only face and chest/ neck. Significant improvement of PSO behind ears.   Neuro: Strength WNL of bilateral upper and lower extremity large muscle groups.     MSK- (T=tender to palpation, S=swelling)  Body habitus limits exam  TMJ: -T/S, FROM   Cervical spine:  FROM  Shoulders: -T/S, FROM   Elbows: -T/S, FROM   Wrists: -T/S, FROM   Hands: -T/S, FROM, Normal  strength. No dactylitis.           Labs:    Lab Results   Component Value Date    ALT 25 03/26/2024    AST 23 03/26/2024    CR 0.73 03/26/2024    CRP 24.9 (H) 03/09/2023      Latest Reference Range & Units 03/09/23 07:39 06/24/23 09:53 09/20/23 08:35 12/14/23 09:44 03/26/24 16:21   Sed Rate 0 - 20 mm/hr 25 (H) 25 (H) 25 (H) 11 16        Latest Reference Range & Units 06/24/23 09:53 09/20/23 08:35 12/14/23 09:44 03/26/24 16:21   CRP Inflammation <5.00 mg/L 25.90 (H) 32.90 (H) 24.00 (H) 11.90 (H)     Imaging:              Assessment and Plan:     1) PSA/PSO (dx 2020):  Significant improvement on Enbrel.   Pt has past elevated LFT's with suspected fatty liver as well as ETOH use increases during summer time cabin trips and vacations, therefore methotrexate has been avoided. Developed drug tequila to humira and lost benefit. If Enbrel  loses efficacy consider starting methotrexate pre-switching biologis in future.     2) Morbid obesity: Has started new medication.     3) New rash: Possible sun related/ new med? To see PCP      Continue with long term management of chronic medical condition.     Pt instructions:    1) Labs every 3 months    2) Continue Enbrel 50 mg weekly, apremilast 30 mg twice a day     3) See me back in 5 months, sooner with questions     4)  If rash doesn't improve or worsen see primary    5) See MTM/ Jacoby to discuss if possible sun sensitivity common with new med    Pt states understanding and agreement to plan of care, has no further questions.     Radha Gong CNP  Rheumatology,  Health

## 2024-04-09 ENCOUNTER — VIRTUAL VISIT (OUTPATIENT)
Dept: RHEUMATOLOGY | Facility: CLINIC | Age: 39
End: 2024-04-09
Attending: NURSE PRACTITIONER
Payer: COMMERCIAL

## 2024-04-09 DIAGNOSIS — E66.01 MORBID OBESITY (H): ICD-10-CM

## 2024-04-09 DIAGNOSIS — J30.2 SEASONAL ALLERGIES: ICD-10-CM

## 2024-04-09 DIAGNOSIS — K21.9 GASTROESOPHAGEAL REFLUX DISEASE WITHOUT ESOPHAGITIS: ICD-10-CM

## 2024-04-09 DIAGNOSIS — Z78.9 TAKES DIETARY SUPPLEMENTS: ICD-10-CM

## 2024-04-09 DIAGNOSIS — L40.50 PSORIATIC ARTHRITIS (H): Primary | ICD-10-CM

## 2024-04-09 NOTE — Clinical Note
4/9/2024       RE: Alicia Smith  [address] [place] [address]     Dear Colleague,    Thank you for referring your patient, Alicia Smith, to the [facility] [place] [facility]. Please see a copy of my visit note below.    Medication Therapy Management (MTM) Encounter    ASSESSMENT:                            Medication Adherence/Access: {adherencechoices:081001}    ***:  ***      PLAN:                            Stop Zepbound!  Concerned for allergic reaction  Reach out to your provider     Follow-up: ***    SUBJECTIVE/OBJECTIVE:                          Alicia Smith is a 38 year old female contacted via secure video for a follow-up visit.       Reason for visit: Potential medication adverse effect.    Allergies/ADRs: Reviewed in chart  Past Medical History: Reviewed in chart  Tobacco: She reports that she has never smoked. She has never been exposed to tobacco smoke. She has never used smokeless tobacco.  Alcohol: Occasional, more often in summers    Medication Adherence/Access: no issues reported    Psoriatic Arthritis:   Enbrel 50 mg subcutaneous injection every 7 days   Otezla 30 mg twice daily   Acetaminophen 500-1000 mg as needed  Ibuprofen 200-400 mg as needed       Enbrel about 9-10 months ago - working well     Ibuprofen - few times per month     Sun rash causes???     Enbrel - skin rash 1-13%  Otezla - skin rash < 1%   Zepbound - can - light sunburn/dryness   Omeprazole - possible   Ibuprofen -       When did rash start???   Started Tuesday and subsided on Saturday  On neck both sides - red, itchy, small bumps, swollen  Hydrocortisone cream for itchiness  Has not come back - first time     Been on Enbrel, Otezla for awhile      Obesity:  Zepbound 2.5 mg subcutaneous injection every 7 days  Patient started Zepbound 6-7 weeks ago and     experiencing a rash that started Tuesday (4/2) and resolved Saturday (4/6).          Zepbound 2.5 mg dose 6-7 weeks ago - restarted on 2.5 mg dose 2 weeks ago (wanted to reset because nautious) - takes on Mondays - took yesterday and noticed possible anaphylactic reaction -     GERD:  Omeprazole 20 mg twice daily   Controlling heartburn symptoms. No concerns at this time.     Allergic Rhinitis:  Loratadine 10 mg daily   Taking daily and controlling symptoms. Has been on for multiple years with no issues.     Takes Dietary Supplements:  Multivitamin daily  Calcium carbonate - vitamin D 600-400 mg-international unit(s) twice daily   Vitamin D 2000 international unit(s) daily   Patient taking for general health and bone health.     Today's Vitals: LMP 03/15/2024 (Approximate)   ----------------    I spent 25 minutes with this patient today. All changes were made via collaborative practice agreement with Radha Gong NP. A copy of the visit note was provided to the patient's provider(s).    A summary of these recommendations was sent via PubliAtis.    Kaylee PaniaguaD  Medication Therapy Management Pharmacist  [facility]  Phone: [phone]     Telemedicine Visit Details  Type of service:  Video Conference via TransEngen  Start Time:  10:30 AM  End Time:  10:55 AM     Medication Therapy Recommendations  No medication therapy recommendations to display         Medication Therapy Management (MTM) Encounter    ASSESSMENT:                            Medication Adherence/Access: No issues identified    Psoriatic Arthritis: Stable. Patient experiencing benefit and tolerating therapy well. Continue current treatment and routine lab monitoring every 3 months.      Obesity: Patient experienced potential allergic reaction to Zepbound and should stop therapy immediately. Allergic reactions typically increase in severity with subsequent doses. Reaction progressed from rash to swelling in face and mouth. Encouraged patient to reach out to primary care provider to discuss  reaction and consider alternative options. Advised patient to go to emergency department if noticing symptoms of allergic reaction.     GERD: Stable.     Allergic Rhinitis: Stable.    Takes Dietary Supplements: Stable.     PLAN:                            Stop Zepbound. Do not take another dose.     Reach out to your prescriber about the potential allergic reaction.   You can discuss next steps with them.     If any symptoms of a allergic reaction return, go to the emergency department.     Follow-up: with MT pharmacist on 7/16/24.     SUBJECTIVE/OBJECTIVE:                          Alicia Smith is a 38 year old female contacted via secure video for a follow-up visit.       Reason for visit: Potential medication adverse effect.    Allergies/ADRs: Reviewed in chart  Past Medical History: Reviewed in chart  Tobacco: She reports that she has never smoked. She has never been exposed to tobacco smoke. She has never used smokeless tobacco.  Alcohol: Occasional, more often in summers    Medication Adherence/Access: no issues reported    Psoriatic Arthritis:   Enbrel 50 mg subcutaneous injection every 7 days   Otezla 30 mg twice daily   Acetaminophen 500-1000 mg as needed  Ibuprofen 200-400 mg as needed   Patient started Enbrel 9-10 months ago and medication has been working very well. Reported hands and right knee have significantly improved. Takes ibuprofen a few times per month and has no concerns at this time.      Obesity:  Zepbound 2.5 mg subcutaneous injection every 7 days  Patient started Zepbound 6-7 weeks ago and stopped after a few doses due to nausea. Took a few weeks off to reset and restarted medication on 4/1. Developed a red, itchy, rash with small bumps on Tuesday (4/2). Patient used hydrocortisone cream for itchiness and rash resolved on Saturday (4/6). Patient took dose on 4/8 and reported immediately experiencing swelling and tingling in the face, mouth, and throat. Patient was concerned for a potential  allergy but reported not going to ER since the reaction never progressed.      GERD:  Omeprazole 20 mg twice daily   Controlling heartburn symptoms. No concerns at this time.     Allergic Rhinitis:  Loratadine 10 mg daily   Taking daily and controlling symptoms. Has been on for multiple years with no issues.     Takes Dietary Supplements:  Multivitamin daily  Calcium carbonate - vitamin D 600-400 mg-international unit(s) twice daily   Vitamin D 2000 international unit(s) daily   Patient taking for general health and bone health.     Today's Vitals: LMP 03/15/2024 (Approximate)   ----------------    I spent 25 minutes with this patient today. All changes were made via collaborative practice agreement with Radha Gong NP. A copy of the visit note was provided to the patient's provider(s).    A summary of these recommendations was sent via Closely.    Jacoby Sheikh PharmD  Medication Therapy Management Pharmacist  [facility]  Phone: [phone]     Telemedicine Visit Details  Type of service:  Video Conference via HabitRPG  Start Time:  10:30 AM  End Time:  10:55 AM     Medication Therapy Recommendations  No medication therapy recommendations to display           Again, thank you for allowing me to participate in the care of your patient.      Sincerely,    Jacoby Sheikh RPH

## 2024-04-09 NOTE — PROGRESS NOTES
Medication Therapy Management (MTM) Encounter    ASSESSMENT:                            Medication Adherence/Access: No issues identified    Psoriatic Arthritis: Stable. Patient experiencing benefit and tolerating therapy well. Continue current treatment and routine lab monitoring every 3 months.      Obesity: Patient experienced potential allergic reaction to Zepbound and should stop therapy immediately. Reactions presented shortly after doses were administered and progressed from rash to swelling in the face and mouth. Patient at risk of more severe reaction with continued use. Reaction from most recent dose has resolved and patient is not currently experiencing any anaphylactic-like symptoms. Encouraged patient to reach out to primary care provider immediately to discuss reaction and consider alternative options. Advised patient to go to emergency department if noticing symptoms of allergic reaction return.     GERD: Stable.     Allergic Rhinitis: Stable.    Takes Dietary Supplements: Stable.     PLAN:                            Stop Zepbound. Do not take another dose.     Reach out to your prescriber about the potential allergic reaction.   You can discuss next steps with them.     If any symptoms of a allergic reaction return, go to the emergency department.     Follow-up: with MTM pharmacist on 7/16/24.     SUBJECTIVE/OBJECTIVE:                          Alicia Smith is a 38 year old female contacted via secure video for a follow-up visit.       Reason for visit: Potential medication adverse effect.    Allergies/ADRs: Reviewed in chart  Past Medical History: Reviewed in chart  Tobacco: She reports that she has never smoked. She has never been exposed to tobacco smoke. She has never used smokeless tobacco.  Alcohol: Occasional, more often in summers    Medication Adherence/Access: no issues reported    Psoriatic Arthritis:   Enbrel 50 mg subcutaneous injection every 7 days   Otezla 30 mg twice daily    Acetaminophen 500-1000 mg as needed  Ibuprofen 200-400 mg as needed   Patient started Enbrel 9-10 months ago and medication has been working very well. Reported hands and right knee have significantly improved. Takes ibuprofen a few times per month and has no concerns at this time.      Obesity:  Zepbound 2.5 mg subcutaneous injection every 7 days  Patient started Zepbound 6-7 weeks ago and stopped after a few doses due to nausea. Took a few weeks off to reset and restarted medication on 4/1. Developed a red, itchy, rash with small bumps on Tuesday (4/2). Patient used hydrocortisone cream for itchiness and rash resolved on Saturday (4/6). Patient took dose on 4/8 and reported immediately experiencing swelling and tingling in the face, mouth, and throat. Patient was concerned for a potential allergy but reported not going to ER since the reaction never progressed.      GERD:  Omeprazole 20 mg twice daily   Controlling heartburn symptoms. No concerns at this time.     Allergic Rhinitis:  Loratadine 10 mg daily   Taking daily and controlling symptoms. Has been on for multiple years with no issues.     Takes Dietary Supplements:  Multivitamin daily  Calcium carbonate - vitamin D 600-400 mg-international unit(s) twice daily   Vitamin D 2000 international unit(s) daily   Patient taking for general health and bone health.     Today's Vitals: LMP 03/15/2024 (Approximate)   ----------------    I spent 25 minutes with this patient today. All changes were made via collaborative practice agreement with Radha Gogn NP. A copy of the visit note was provided to the patient's provider(s).    A summary of these recommendations was sent via FarmBot.    Jacoby Sheikh, Brian  Medication Therapy Management Pharmacist  [facility]  Phone: [phone]     Telemedicine Visit Details  Type of service:  Video Conference via [employer]  Start Time:  10:30 AM  End Time:  10:55 AM     Medication Therapy  Recommendations  Morbid obesity (H)    Current Medication: tirzepatide-Weight Management (ZEPBOUND) 2.5 MG/0.5ML prefilled pen   Rationale: Unsafe medication for the patient - Adverse medication event - Safety   Recommendation: Discontinue Medication - Discuss with provider   Status: Patient Agreed - Adherence/Education

## 2024-04-09 NOTE — PATIENT INSTRUCTIONS
"Recommendations from today's MTM visit:                                                    MTM (medication therapy management) is a service provided by a clinical pharmacist designed to help you get the most of out of your medicines.      Stop Zepbound. Do not take another dose.     Reach out to your prescriber about the potential allergic reaction.   You can discuss next steps with them.     If any symptoms of a allergic reaction return, go to the emergency department.     Follow-up: with MTM pharmacist on 7/16/24.     It was great speaking with you today.  I value your experience and would be very thankful for your time in providing feedback in our clinic survey. In the next few days, you may receive an email or text message from Adea with a link to a survey related to your  clinical pharmacist.\"     To schedule another MTM appointment, please call the clinic directly or you may call the MTM scheduling line at [phone].    My Clinical Pharmacist's contact information:                                                      Please feel free to contact me with any questions or concerns you have.      Jacoby Sheikh, PharmD  Medication Therapy Management Pharmacist  [facility]  Phone: [phone]    "

## 2024-04-09 NOTE — Clinical Note
Patient experienced rash after Zepbound dose on 4/1 and swelling/tingling in face and mouth after most recent Zepbound dose on 4/8. Currently has no symptoms but I am concerned she is having allergic reaction to the medication. Encouraged her to reach out to PCP for next steps and to be seen in ED if symptoms return.

## 2024-05-06 NOTE — PROGRESS NOTES
"Alicia is a 38 year old who is being evaluated via a billable video visit.      The patient has been notified of following:     \"This video visit will be conducted via a call between you and your physician/provider. We have found that certain health care needs can be provided without the need for an in-person physical exam.  This service lets us provide the care you need with a video conversation.  If a prescription is necessary we can send it directly to your pharmacy.  If lab work is needed we can place an order for that and you can then stop by our lab to have the test done at a later time.    Video visits are billed at different rates depending on your insurance coverage.  Please reach out to your insurance provider with any questions.    If during the course of the call the physician/provider feels a video visit is not appropriate, you will not be charged for this service.\"    Patient has given verbal consent for Video visit? Yes    How would you like to obtain your AVS? MyChart    If the video visit is dropped, the invitation should be resent by: Text to cell phone: [phone]    Will anyone else be joining your video visit? No    I    Video-Visit Details    Type of service:  Video Visit    Originating Location (pt. Location): Home    Distant Location (provider location):   Off-Site - Provider Home Office    Platform used for Video Visit: Offerial    Video Start Time: 9:25 AM    Video End Time:9:42 AM        Return Medical Weight Management Note         Alicia Smith  MRN:  [id number]  :  1985  JJ:  2024        Dear EVIE Harvey CNP,    I had the pleasure of seeing your patient Alicia Smith. She is a 38 year old female who I am continuing to see for treatment of obesity related to:        2024     3:30 PM   --   I have the following health issues associated with obesity GERD (Reflux)    Fatty Liver   I have the following symptoms associated with obesity Knee Pain    Lower Extremity " "Swelling    Back Pain    Groin Rash    Hip Pain       Assessment   Problem List Items Addressed This Visit       Morbid obesity (H) - Primary     Start phentermine         Relevant Medications    phentermine 15 MG capsule          PLAN/DISCUSSION:  We discussed the role of pharmacological agents in the treatment of obesity and the \"off-label\" use of medications in this practice. We discussed the risks and benefits of each. We discussed indications, contraindications, potential side effects, and estimated costs of each. Discussed that medications must always be used together with lifestyle changes such as improvements in diet choices, portion control and establishing and maintaining a regular exercise program.     Congratulated patient on overall weight loss and lifestyle changes.     Rx for phentermine 15 mg sent over to pharmacy.  Patient has agreed to have their blood pressure and pulse checked 7-10 days after starting and will let clinic know if blood pressure goes above 140/90 or pulse greater than 100 bpm.      Upon review of vitals will send over an additional month of phentermine 15 mg capsules      FOLLOW-UP:  September       SUBJECTIVE/OBJECTIVE:  Patient initially seen 2/8/2024 and zepbound was sent over. Patient cost is OOP.  After the 5th - 6 th injection of the zepbound 2.5 mg started getting numbness and tingling in chest and scratchy throat with a racing heartbeat. The week prior to this saw rheumatologist and had rash - but patient did not connect this to the zepbound at that time. Then reached out to [facility] pharmacist who had patient immediately stop the injections. This was about a month ago. It took 3-4 days for all the symptoms to resolve.    Felt like the zepbound helped with appetite suppression but has continued to lose weight since stopping. Having smaller portions and reducing snacks. IF snacks has a healthier choice. Would like to discuss other weight loss medication options      Antiobesity " medication history:  Phentermine (Lomaira, Adipex)  Discussed and this can be option    Phentermine/Topiramate (Qsymia)    Bupropion/Naltrexone (Contrave)    Liraglutide (Saxenda)    Semaglutide (Wegovy) Avoid due to allergic reaction    Tirzepatide (Zepbound) Avoid due to allergic reaction    Orlistat (Xenical/Terry)    Off-Label Medications for Obesity  Semaglutide (Ozempic)    Tirzepatide (Mounjaro) Not diabetic   Bupropion (Wellbutrin) option   Metformin(Glucophage) option   Topiramate (Topamax) option   Naltexone              5/15/2024     1:05 AM   Weight Loss Medication History Reviewed With Patient   Which weight loss medications are you currently taking on a regular basis? None   If you are not taking a weight loss medication that was prescribed to you, please indicate why: Other   Are you having any side effects from the weight loss medication that we have prescribed you? Yes   If you are having side effects please describe: Anaphylaxis       Recent diet changes: fluid: 60 oz water daily  B: whole wheat toast with slice of cheese and a piece of meat  L: veggie wrap  D: 3 soft shelled tacos from [employer]     Recent exercise/activity changes: maybe once weekly     CURRENT WEIGHT:   306 lbs 0 oz    Initial Weight (lbs): 335 lbs  Last Visits Weight: 335 lb (152 kg)  Cumulative weight loss (lbs): 29  Weight Loss Percentage: 8.66%        5/15/2024     1:05 AM   Changes and Difficulties   I have made the following changes to my diet since my last visit: Smaller portions. Reduced snacking. Less carbs. More protein.   With regards to my diet, I am still struggling with: Portion size   I have made the following changes to my activity/exercise since my last visit: Attempting to be more active.   With regards to my activity/exercise, I am still struggling with: Remembering to exercise and motivation.         MEDICATIONS:   Current Outpatient Medications   Medication Sig Dispense Refill    acetaminophen (TYLENOL) 500  "MG tablet       apremilast (OTEZLA) 30 MG tablet Take 1 tablet (30 mg) by mouth 2 times daily Hold for signs of infection, and seek medical attention 180 tablet 3    Calcium Carbonate-Vitamin D 600-10 MG-MCG TABS       Cholecalciferol (VITAMIN D) 2000 units tablet Take 1 tablet by mouth daily      etanercept (ENBREL SURECLICK) 50 MG/ML autoinjector Inject 50 mg Subcutaneous every 7 days 4 mL 11    ibuprofen (ADVIL/MOTRIN) 200 MG tablet       Loratadine (CLARITIN PO) Take  by mouth.      multivitamin w/minerals (THERA-VIT-M) tablet       omeprazole (PRILOSEC) 20 MG DR capsule Take 1 capsule (20 mg) by mouth 2 times daily 60 capsule 2    phentermine 15 MG capsule Take 1 capsule (15 mg) by mouth every morning 60 capsule 0       ROS:  HEENT  H/O glaucoma:            no  Cardiovascular  CAD:                            no  - EKG 2022 WNL   Palpitations:                no  HTN:                            no  Gastrointestinal  GERD:                         Yes - controlled with omeprazole 20 mg BID  Constipation:               no  Liver Dz:                      Yes - fatty liver - levels are normal at this point  H/O Pancreatitis:         no  H/O Gallbladder Dz:    no  Psychiatric  Moods Stable:             yes  Anxiety:                       no  Depression:                 no  Bipolar:                        no  H/O ETOH/Drug Use: no  H/O eating disorder:    no  Endocrine  PMH/FMH of MTC or MEN2:  no  Neurologic:  H/O seizures:              no  Headaches:                 no  Memory Impairment:   no    H/O kidney stones:     no  Kidney disease:          no  Current birth control:   NO      BP Readings from Last 6 Encounters:   05/15/24 129/82   04/03/24 122/86   01/19/24 132/89   12/14/23 124/82   08/10/23 110/78   03/23/23 124/88     Pulse Readings from Last 6 Encounters:   05/15/24 63   04/03/24 97   01/19/24 93   12/14/23 78   08/10/23 68   03/23/23 95         PHYSICAL EXAM:  Objective    Ht 5' 5.35\" (1.66 m)   Wt 306 " lb (138.8 kg)   LMP 04/22/2024 (Exact Date)   BMI 50.38 kg/m    GENERAL: Healthy, alert and no distress  EYES: Eyes grossly normal to inspection.  No discharge or erythema, or obvious scleral/conjunctival abnormalities.  RESP: No audible wheeze, cough, or visible cyanosis.  No visible retractions or increased work of breathing.    SKIN: Visible skin clear. No significant rash, abnormal pigmentation or lesions.  NEURO: Cranial nerves grossly intact.  Mentation and speech appropriate for age.  PSYCH: Mentation appears normal, affect normal/bright, judgement and insight intact, normal speech and appearance well-groomed.        Sincerely,    Leroy Sheth PA-C    25 minutes spent on the date of the encounter doing chart review, review of test results, patient visit and documentation

## 2024-05-15 ENCOUNTER — ALLIED HEALTH/NURSE VISIT (OUTPATIENT)
Dept: FAMILY MEDICINE | Facility: CLINIC | Age: 39
End: 2024-05-15
Payer: COMMERCIAL

## 2024-05-15 VITALS
WEIGHT: 293 LBS | BODY MASS INDEX: 48.82 KG/M2 | SYSTOLIC BLOOD PRESSURE: 129 MMHG | HEART RATE: 63 BPM | HEIGHT: 65 IN | OXYGEN SATURATION: 98 % | DIASTOLIC BLOOD PRESSURE: 82 MMHG | RESPIRATION RATE: 18 BRPM

## 2024-05-15 DIAGNOSIS — Z01.30 BLOOD PRESSURE CHECK: Primary | ICD-10-CM

## 2024-05-15 PROCEDURE — 99207 PR NO CHARGE NURSE ONLY: CPT

## 2024-05-15 ASSESSMENT — PAIN SCALES - GENERAL: PAINLEVEL: NO PAIN (0)

## 2024-05-15 NOTE — PROGRESS NOTES
"I met with Alicia Smith at the request of Domenic to recheck her blood pressure.  Blood pressure medications on the med list were reviewed with patient.    Patient has taken all medications as per usual regimen: Yes  Patient reports tolerating them without any issues or concerns: No    Vitals:    05/15/24 0847   BP: 129/82   Pulse: 63   Resp: 18   SpO2: 98%   Weight: 138.8 kg (306 lb)   Height: 1.66 m (5' 5.35\")       Blood pressure was taken, previous encounter was reviewed, recorded blood pressure below 140/90.  Patient was discharged and the note will be sent to the provider for final review. Patient has up coming appointment.    "

## 2024-05-15 NOTE — Clinical Note
Patient was here today for vitals check. recorded blood pressure below 140/90.  Patient was discharged and the note will be sent to the provider for final review. Patient has up coming appointment.

## 2024-05-17 ENCOUNTER — VIRTUAL VISIT (OUTPATIENT)
Dept: SURGERY | Facility: CLINIC | Age: 39
End: 2024-05-17
Payer: COMMERCIAL

## 2024-05-17 VITALS — HEIGHT: 65 IN | BODY MASS INDEX: 48.82 KG/M2 | WEIGHT: 293 LBS

## 2024-05-17 DIAGNOSIS — E66.01 MORBID OBESITY (H): Primary | ICD-10-CM

## 2024-05-17 PROCEDURE — 99213 OFFICE O/P EST LOW 20 MIN: CPT | Mod: 95 | Performed by: PHYSICIAN ASSISTANT

## 2024-05-17 RX ORDER — PHENTERMINE HYDROCHLORIDE 15 MG/1
15 CAPSULE ORAL EVERY MORNING
Qty: 60 CAPSULE | Refills: 0 | Status: SHIPPED | OUTPATIENT
Start: 2024-05-17 | End: 2024-06-18

## 2024-05-17 NOTE — PATIENT INSTRUCTIONS
"Nice to talk with you today! Thank you for allowing me the privilege of caring for you.   We hope we provided you with the excellent service you deserve.     To ensure the quality of our services you may receive a patient satisfaction survey from an independent monitoring company.  The greatest compliment you can give is \"Likely to Recommend\"      Below is our plan we discussed.-  ALISA Harper      Start phentermine  Get weight, blood pressure and pulse in the first couple weeks of starting and send over to clinic for review    Please call [phone] and schedule a follow up with Leroy Sheth PA-C in 3 months.  If you need to reach me sooner you can do so by calling [phone].    Have a great day!     .  Here is some information about the medication we discussed.  Please have your blood pressure and pulse checked 7-10 days after starting and let clinic know if your blood pressure goes above 140/90 or pulse greater than 100 bpm!        MEDICATION STARTED AT THIS APPOINTMENT    We are starting Phentermine. Take one tablet in the morning. Contact the nurse via [employer] or call [phone] if you have any questions or concerns. (Do not stop taking it if you don't think it's working. For some people it works without them knowing it.)    Phentermine is being prescribed because you identified hunger as one of the main causes for your extra weight.      Our patients on Phentermine find that they:    >feel less hunger    >find it easier to push the plate away   >have an easier time eating less    For some of our patients, these feelings are very real and immediate. For other patients, the feelings are less obvious. They don't feel much of a change but find they've lost weight. Like all weight loss medications, Phentermine  works best when you help it work. This means:  1. Having less tempting high calorie (fattening) food around the house or office. (For people with strong cravings this is very important.)   2. Staying " away from situations or people that may trigger your cravings .   3. Eating out only one time or less each week.  4. Eating your meals at a table with the TV or computer off.    Side-effects. Phentermine is generally well tolerated. The main side-effects we see are feelings of racing pulse or rapid heart beat. Some people can get an elevated blood pressure. Because of this we may have you come back within a week or so of starting the medication for a blood pressure check.         In order to get refills of this or any medication we prescribe you must be seen in the medical weight mgmt clinic every 2-3 months.

## 2024-05-21 ENCOUNTER — TELEPHONE (OUTPATIENT)
Dept: FAMILY MEDICINE | Facility: CLINIC | Age: 39
End: 2024-05-21
Payer: COMMERCIAL

## 2024-05-21 NOTE — TELEPHONE ENCOUNTER
Patient Quality Outreach    Patient is due for the following:   Cervical Cancer Screening - PAP Needed  Physical Preventive Adult Physical      Topic Date Due    Pneumococcal Vaccine (1 of 2 - PCV) Never done    Hepatitis B Vaccine (1 of 3 - 19+ 3-dose series) Never done       Next Steps:   Schedule a Adult Preventative    Type of outreach:    Sent [employer] message.    Next Steps:  Reach out within 90 days via [employer].    Max number of attempts reached: Yes. Will try again in 90 days if patient still on fail list.    Questions for provider review:    None           Rose Cerna MA

## 2024-06-06 ENCOUNTER — ALLIED HEALTH/NURSE VISIT (OUTPATIENT)
Dept: FAMILY MEDICINE | Facility: CLINIC | Age: 39
End: 2024-06-06
Payer: COMMERCIAL

## 2024-06-06 VITALS
HEIGHT: 65 IN | TEMPERATURE: 98.6 F | HEART RATE: 74 BPM | DIASTOLIC BLOOD PRESSURE: 90 MMHG | SYSTOLIC BLOOD PRESSURE: 123 MMHG | RESPIRATION RATE: 16 BRPM | BODY MASS INDEX: 48.82 KG/M2 | OXYGEN SATURATION: 99 % | WEIGHT: 293 LBS

## 2024-06-06 DIAGNOSIS — Z01.30 BLOOD PRESSURE CHECK: Primary | ICD-10-CM

## 2024-06-06 PROCEDURE — 99207 PR NO CHARGE NURSE ONLY: CPT

## 2024-06-18 ENCOUNTER — ALLIED HEALTH/NURSE VISIT (OUTPATIENT)
Dept: FAMILY MEDICINE | Facility: CLINIC | Age: 39
End: 2024-06-18
Payer: COMMERCIAL

## 2024-06-18 VITALS — HEART RATE: 64 BPM | DIASTOLIC BLOOD PRESSURE: 84 MMHG | SYSTOLIC BLOOD PRESSURE: 123 MMHG | OXYGEN SATURATION: 100 %

## 2024-06-18 DIAGNOSIS — Z01.30 BLOOD PRESSURE CHECK: Primary | ICD-10-CM

## 2024-06-18 DIAGNOSIS — E66.01 MORBID OBESITY (H): ICD-10-CM

## 2024-06-18 PROCEDURE — 99207 PR NO CHARGE NURSE ONLY: CPT

## 2024-06-18 RX ORDER — PHENTERMINE HYDROCHLORIDE 15 MG/1
15 CAPSULE ORAL EVERY MORNING
Qty: 30 CAPSULE | Refills: 0 | Status: SHIPPED | OUTPATIENT
Start: 2024-06-18 | End: 2024-08-23

## 2024-06-18 NOTE — PROGRESS NOTES
Alicia Smith is a 38 year old patient who comes in today for a Blood Pressure check.  Initial BP:  /84   Pulse 64   LMP 04/22/2024 (Exact Date)   SpO2 100%      Data Unavailable  Disposition: follow-up as previously indicated by provider and results routed to provider      Rose Marie Summers MA on 6/18/2024 at 2:53 PM

## 2024-07-06 ENCOUNTER — HEALTH MAINTENANCE LETTER (OUTPATIENT)
Age: 39
End: 2024-07-06

## 2024-07-17 ENCOUNTER — TELEPHONE (OUTPATIENT)
Dept: RHEUMATOLOGY | Facility: CLINIC | Age: 39
End: 2024-07-17
Payer: COMMERCIAL

## 2024-07-17 NOTE — TELEPHONE ENCOUNTER
MTM appointment canceled, we made one more attempt to reschedule.     Routing back to referring provider and MTM Pharmacist Team      Ash Liu [facility]

## 2024-08-23 ENCOUNTER — MYC REFILL (OUTPATIENT)
Dept: SURGERY | Facility: CLINIC | Age: 39
End: 2024-08-23
Payer: COMMERCIAL

## 2024-08-23 DIAGNOSIS — E66.01 MORBID OBESITY (H): ICD-10-CM

## 2024-08-23 RX ORDER — PHENTERMINE HYDROCHLORIDE 15 MG/1
15 CAPSULE ORAL EVERY MORNING
Qty: 30 CAPSULE | Refills: 0 | Status: SHIPPED | OUTPATIENT
Start: 2024-08-23 | End: 2024-09-27

## 2024-08-23 NOTE — TELEPHONE ENCOUNTER
Pt last seen 5/17/24 and next 9/27/24.  Pt requesting refill on phentermine.  Had vitals done 6/18/24:   /84   Pulse 64   Will route to provider for refill.  Dayami Cisneros MS, RD, RN

## 2024-09-04 ENCOUNTER — LAB (OUTPATIENT)
Dept: LAB | Facility: CLINIC | Age: 39
End: 2024-09-04
Payer: COMMERCIAL

## 2024-09-04 DIAGNOSIS — L40.50 PSORIATIC ARTHRITIS (H): ICD-10-CM

## 2024-09-04 DIAGNOSIS — L40.9 PSORIASIS: ICD-10-CM

## 2024-09-04 DIAGNOSIS — R21 RASH: ICD-10-CM

## 2024-09-04 LAB
ERYTHROCYTE [DISTWIDTH] IN BLOOD BY AUTOMATED COUNT: 13.1 % (ref 10–15)
ERYTHROCYTE [SEDIMENTATION RATE] IN BLOOD BY WESTERGREN METHOD: 19 MM/HR (ref 0–20)
HCT VFR BLD AUTO: 40 % (ref 35–47)
HGB BLD-MCNC: 13.2 G/DL (ref 11.7–15.7)
MCH RBC QN AUTO: 30.2 PG (ref 26.5–33)
MCHC RBC AUTO-ENTMCNC: 33 G/DL (ref 31.5–36.5)
MCV RBC AUTO: 92 FL (ref 78–100)
PLATELET # BLD AUTO: 374 10E3/UL (ref 150–450)
RBC # BLD AUTO: 4.37 10E6/UL (ref 3.8–5.2)
WBC # BLD AUTO: 12.3 10E3/UL (ref 4–11)

## 2024-09-04 PROCEDURE — 84450 TRANSFERASE (AST) (SGOT): CPT

## 2024-09-04 PROCEDURE — 86140 C-REACTIVE PROTEIN: CPT

## 2024-09-04 PROCEDURE — 99000 SPECIMEN HANDLING OFFICE-LAB: CPT

## 2024-09-04 PROCEDURE — 36415 COLL VENOUS BLD VENIPUNCTURE: CPT

## 2024-09-04 PROCEDURE — 85652 RBC SED RATE AUTOMATED: CPT

## 2024-09-04 PROCEDURE — 84460 ALANINE AMINO (ALT) (SGPT): CPT

## 2024-09-04 PROCEDURE — 86038 ANTINUCLEAR ANTIBODIES: CPT

## 2024-09-04 PROCEDURE — 85027 COMPLETE CBC AUTOMATED: CPT

## 2024-09-04 PROCEDURE — 82040 ASSAY OF SERUM ALBUMIN: CPT

## 2024-09-04 PROCEDURE — 82565 ASSAY OF CREATININE: CPT

## 2024-09-04 PROCEDURE — 83516 IMMUNOASSAY NONANTIBODY: CPT | Mod: 90

## 2024-09-05 LAB
ALBUMIN SERPL BCG-MCNC: 4.2 G/DL (ref 3.5–5.2)
ALT SERPL W P-5'-P-CCNC: 22 U/L (ref 0–50)
AST SERPL W P-5'-P-CCNC: 25 U/L (ref 0–45)
CREAT SERPL-MCNC: 0.8 MG/DL (ref 0.51–0.95)
CRP SERPL-MCNC: 16.1 MG/L
EGFRCR SERPLBLD CKD-EPI 2021: >90 ML/MIN/1.73M2

## 2024-09-06 LAB — HISTONE IGG SER IA-ACNC: 0.4 UNITS

## 2024-09-09 ENCOUNTER — OFFICE VISIT (OUTPATIENT)
Dept: RHEUMATOLOGY | Facility: CLINIC | Age: 39
End: 2024-09-09
Payer: COMMERCIAL

## 2024-09-09 VITALS
TEMPERATURE: 98.5 F | WEIGHT: 287.8 LBS | SYSTOLIC BLOOD PRESSURE: 128 MMHG | HEART RATE: 77 BPM | OXYGEN SATURATION: 100 % | BODY MASS INDEX: 47.89 KG/M2 | DIASTOLIC BLOOD PRESSURE: 85 MMHG

## 2024-09-09 DIAGNOSIS — L40.50 PSORIATIC ARTHRITIS (H): ICD-10-CM

## 2024-09-09 DIAGNOSIS — D72.829 LEUKOCYTOSIS, UNSPECIFIED TYPE: Primary | ICD-10-CM

## 2024-09-09 DIAGNOSIS — Z79.899 ENCOUNTER FOR LONG-TERM (CURRENT) USE OF HIGH-RISK MEDICATION: ICD-10-CM

## 2024-09-09 PROCEDURE — 99214 OFFICE O/P EST MOD 30 MIN: CPT | Performed by: NURSE PRACTITIONER

## 2024-09-09 PROCEDURE — G2211 COMPLEX E/M VISIT ADD ON: HCPCS | Performed by: NURSE PRACTITIONER

## 2024-09-09 RX ORDER — MECLIZINE HCL 25MG 25 MG/1
25 TABLET, CHEWABLE ORAL PRN
COMMUNITY
Start: 2024-03-20

## 2024-09-09 RX ORDER — APREMILAST 30 MG/1
30 TABLET, FILM COATED ORAL 2 TIMES DAILY
Qty: 180 TABLET | Refills: 3 | Status: SHIPPED | OUTPATIENT
Start: 2024-09-09

## 2024-09-09 ASSESSMENT — PAIN SCALES - GENERAL: PAINLEVEL: MODERATE PAIN (4)

## 2024-09-09 NOTE — PROGRESS NOTES
"    Rheumatology Clinic Visit  Radha Gong, CNP      Alicia Smith  YOB: 1985    Age: 38 year old   MRN# [id number]       Date of Visit: 09/09/2024  Primary care provider: Rosa Bentley              Subjective:     Alicia is a 38 year old female presents today for follow-up for PSA/PSO (dx 2020). Current treatment: Enbrel 50 mg weekly (8/23) , Otezla 30 mg BID (8/21)    9/09/2024:    PSA:  Hands are 90 % improved on Enbrel, R knee is 95% improved. Doing well.     PSO: Improved.     ROS: Is on Phentermine,  lost 48 lbs     HPI:  Pt had thrombocytosis and neutrophilia and found to be likely related to inflammatory process, was having joint pain and rash and found to have PSA/PSO.    LOV 7/5/2022 with Dr. Alejandro:  \"Psoriatic arthritis: Pain in her joints started in 2020 in her right leg. Pain radiated from right groin into the thigh.  MRI lumbar spine showed mild DDD. She had bilateral hip injection which helped for a week. In 4/2021 she noticed sausage like swelling of her right third, fourth toes.  She had pain and tenderness over left wrist, right ankle and right knee and swelling over her wrist and ankles.  Also had numbness in the left thumb, index and middle finger. She has hx of Psoriasis.      Her clinical history, physical exam is consistent with psoriatic arthritis.  She has dactylitis, enthesitis in the form of plantar fasciitis.  She has synovitis in left wrist, bilateral ankles.     She has negative rheumatoid factor, anti-CCP, HLA-B27 genotype.  X-ray of bilateral hand, wrist, feet showed no erosive disease. She was given prednisone taper and responded very well.  Otezla was started in 8/2021 for psoriatic arthritis.  Denies any side effects.  It cleared psoriasis, it is 70% better.  SSZ was added due to persistent joint pains in 1/2022 but developed skin rash due to it. Later Humira was started in 2/2022. She has noted marked improvement on Humira.       Will continue Humira 40 mg " "subcutaneous once every 2 weeks.      Psoriasis: Psoriasis is better on Otezla and Humira.    Lumbar degenerative disc disease: She also has lumbar degenerative disc disease.  Reports chronic low back pain and follows with orthopedic.   Right knee Osteoarthritis : She has OA in her right knee and is seeing [facility] for it. She received intra articular steroid injection in her right knee in 2/2022 which helped.    Morbid obesity: She is morbidly obese.  Once overall inflammation is under control she should focus on weight loss, physical exercise, pool therapy which will help her long-term.\"    Thrombocytosis and neutrophilia work up 5/25/2021 LOV with Dr. Szymanski  \"I discussed with her that I suspect that the elevated white blood cell count is due to neutrophils and the mildly elevated platelet count is most likely reactive to some sort of inflammatory condition going on.  It is quite possible that this is connected to the joint pains that she is having.  I wonder whether she has a seronegative arthritis like psoriatic arthritis underlying.  We will know when she has the rheumatology work-up.     Meanwhile we will go for a work-up to make sure that she does not have an underlying myeloproliferative disorder that is responsible for the neutrophilia and thrombocytosis.  She was agreeable to doing a systematic work-up.  If an underlying hematological disorder is ruled out, then she need not worry about that again.   Today we will obtain the following labs: Ferritin, TIBC panel, BCR/ABL qualitative PCR, JAK2 mutation with reflex to MPL and PERNELL R mutations.   I discussed with her that once I have the results of the gene tests available, I will give her a call.  I asked her to expect a call in about 2 weeks time.\"      Vaccinations: Vaccinations reviewed with Ms. Smith. Risks and benefits of vaccinations were discussed.  - Influenza: encouraged yearly vaccination. Has 2022  - Jewzbru32: prescription sent  - " Zihjbrloo32: to receive 8 weeks after [id number] is administered  - Zostavax: after age 50  - COVID : 12/21/21, 2/5/21, 1/15/21, 10/11/2022    Past Rheum tx:  SSZ-rash  Humira 40 mg every 2 weeks (2/22)-developed drug tequila and no drug level    Social History  [place]  Nurse-Ortho-surg RN at [employer]   No tobacco  ETOH rare[place]  No exercise  Read  [place], travel   No children, not intending pg.     FMH:  NO AI dz.     Active Problem list:  Patient Active Problem List    Diagnosis Date Noted    Other immunodeficiencies (H24) 03/23/2023     Priority: Medium    Irritable bowel syndrome with both constipation and diarrhea 12/29/2022     Priority: Medium    Nonspecific elevation of levels of transaminase or lactic acid dehydrogenase (LDH) 12/29/2022     Priority: Medium     Formatting of this note might be different from the original.  Created by Conversion      Seasonal allergies 12/29/2022     Priority: Medium    Psoriatic arthritis (H) 11/07/2022     Priority: Medium    Psoriasis 11/07/2022     Priority: Medium    DDD (degenerative disc disease), lumbar 11/07/2022     Priority: Medium    Primary osteoarthritis of right knee 11/07/2022     Priority: Medium    Morbid obesity (H) 07/08/2021     Priority: Medium    Neutrophilia 05/25/2021     Priority: Medium    Thrombocytosis 05/25/2021     Priority: Medium    Vitamin D deficiency 04/23/2018     Priority: Medium            Objective:     Physical Exam  /85 (BP Location: Left arm, Patient Position: Sitting, Cuff Size: Adult Large)   Pulse 77   Temp 98.5  F (36.9  C) (Oral)   Wt 130.5 kg (287 lb 12.8 oz)   LMP 08/28/2024 (Approximate)   SpO2 100%   Breastfeeding No   BMI 47.89 kg/m    Body mass index is 47.89 kg/m .    Constitutional: lg body habitus with out gross deformities. Well groomed.   Psych: nl judgement, orientation, memory, affect.  Eyes: wnl EOM, , vision, conjunctiva, sclera   Resp: l nl work of breathing    Skin: no  nail pitting, alopecia.no rash.. Significant improvement of PSO behind ears.   Neuro: Strength WNL of bilateral upper and lower extremity large muscle groups.     MSK- (T=tender to palpation, S=swelling)  Body habitus limits exam  TMJ: -T/S, FROM   Cervical spine:  FROM, occiput to wall 0cm  Lateral bend slight Limited to L   Lumbar-Finger tip to floor 0cm  Shoulders: -T/S, FROM   Elbows: -T/S, FROM   Wrists: -T/S, FROM   Hands: -T/S, FROM, Normal  strength. No dactylitis.   Knees: -T/S, FROM  Ankles: -T/S  Feet: -T/S, FROM          Labs:    Lab Results   Component Value Date    ALT 22 09/04/2024    AST 25 09/04/2024    CR 0.80 09/04/2024    CRP 24.9 (H) 03/09/2023      Latest Reference Range & Units 03/09/23 07:39 06/24/23 09:53 09/20/23 08:35 12/14/23 09:44 03/26/24 16:21   Sed Rate 0 - 20 mm/hr 25 (H) 25 (H) 25 (H) 11 16        Latest Reference Range & Units 06/24/23 09:53 09/20/23 08:35 12/14/23 09:44 03/26/24 16:21   CRP Inflammation <5.00 mg/L 25.90 (H) 32.90 (H) 24.00 (H) 11.90 (H)     Imaging:              Assessment and Plan:     1) PSA/PSO (dx 2020):  Significant improvement on Enbrel.   Pt has past elevated LFT's with suspected fatty liver as well as ETOH use increases during summer time cabin trips and vacations, therefore methotrexate has been avoided. Developed drug tequila to humira and lost benefit. If Enbrel  loses efficacy consider starting methotrexate pre-switching biologis in future.  Doing well currently.    2) Morbid obesity: losing wgt/      3) Elevated WBC: has seen oncology in past. Elevated plts has improved with PSA treatment.. WBC intermittent still elevated. If increasees to >15 will refer back to oncology.     Continue with long term management of chronic medical condition.     Pt instructions:    1) Labs every 3-4 months    2) Continue Enbrel 50 mg weekly, apremilast 30 mg twice a day     3) See me back in 5-6 months, sooner with questions     Pt states understanding and agreement to  plan of care, has no further questions.     Radha Gong, CNP  Rheumatology,  Health

## 2024-09-09 NOTE — PATIENT INSTRUCTIONS
1) Labs every 3-4 months    2) Continue Enbrel 50 mg weekly, apremilast 30 mg twice a day     3) See me back in 5-6 months, sooner with questions

## 2024-09-10 LAB — ANA SER QL IF: NEGATIVE

## 2024-09-17 NOTE — PROGRESS NOTES
"Alicia is a 38 year old who is being evaluated via a billable video visit.      The patient has been notified of following:     \"This video visit will be conducted via a call between you and your physician/provider. We have found that certain health care needs can be provided without the need for an in-person physical exam.  This service lets us provide the care you need with a video conversation.  If a prescription is necessary we can send it directly to your pharmacy.  If lab work is needed we can place an order for that and you can then stop by our lab to have the test done at a later time.    Video visits are billed at different rates depending on your insurance coverage.  Please reach out to your insurance provider with any questions.    If during the course of the call the physician/provider feels a video visit is not appropriate, you will not be charged for this service.\"    Patient has given verbal consent for Video visit? Yes    How would you like to obtain your AVS? MyChart    If the video visit is dropped, the invitation should be resent by: Text to cell phone: [phone]    Will anyone else be joining your video visit? No    I    Video-Visit Details    Type of service:  Video Visit    Originating Location (pt. Location): Home    Distant Location (provider location):   Off-Site - Provider Home Office    Platform used for Video Visit: [employer]    Video Start Time: 9:00 AM    Video End Time: 9:14 AM        Return Medical Weight Management Note         Alicia Smith  MRN:  [id number]  :  1985  JJ:  2024        Dear EVIE Harvey CNP,    I had the pleasure of seeing your patient Alicia Smith. She is a 38 year old female who I am continuing to see for treatment of obesity related to:        2024     3:30 PM   --   I have the following health issues associated with obesity GERD (Reflux)    Fatty Liver   I have the following symptoms associated with obesity Knee Pain    Lower Extremity " Swelling    Back Pain    Groin Rash    Hip Pain       Assessment   Problem List Items Addressed This Visit       Morbid obesity (H) - Primary    Relevant Medications    phentermine 15 MG capsule          PLAN/DISCUSSION:  Congratulated patient on overall weight loss and lifestyle changes. Emphasized the importance of diet and activity for long term success. Will continue with phentermine 15 mg capsules      FOLLOW-UP:  4 months         SUBJECTIVE/OBJECTIVE:  [place] [employer] [employer]  Patient last seen 5/17/2024 and was started on phentermine 15 mg capsules. Had blood pressure checked. The initial reading was a bit high but subsequent readings WNL. Feels like the phentermine has helped decrease appetite. Down over 20 lbs since last visit.   More mindful focusing on portion sizes and protein.     Anti-obesity medications:   Current: phentermine 15 mg in the AM  Side effects: No anxiety, dry mouth or constipation.       Antiobesity medication history:  Phentermine (Lomaira, Adipex) taking   Phentermine/Topiramate (Qsymia)    Bupropion/Naltrexone (Contrave)    Liraglutide (Saxenda)    Semaglutide (Wegovy) Avoid due to allergic Rxn   Tirzepatide (Zepbound) Avoid due to allergic Rxn  Developed rash and scratchy throat   Orlistat (Xenical/Terry)    Off-Label Medications for Obesity  Semaglutide (Ozempic)    Tirzepatide (Mounjaro) Not diabetic    Bupropion (Wellbutrin)    Metformin(Glucophage)    Topiramate (Topamax)    Naltexone              9/23/2024     8:17 AM   Weight Loss Medication History Reviewed With Patient   Which weight loss medications are you currently taking on a regular basis? Phentermine   Are you having any side effects from the weight loss medication that we have prescribed you? No       Recent diet changes: Gets off work at 7:30 am. Will have breakfast and go to sleep until 4 pm  B: pulled pork rice and veggie bowl from home  Around 5 pm would have a protein and veggie and carb  Next  "meal around 1 am - typical lunch food with soup and sandwich  Fluids: at least 50 oz per day    Recent exercise/activity changes: sporadic 1-2 times weekly will walk or on elliptical. Tries to do weights    Recent stressors: low      CURRENT WEIGHT:   282 lbs 0 oz    Initial Weight (lbs): 335 lbs  Last Visits Weight: 306 lb (138.8 kg)  Cumulative weight loss (lbs): 53  Weight Loss Percentage: 15.82%        9/23/2024     8:17 AM   Changes and Difficulties   I have made the following changes to my diet since my last visit: Less snacking more protein   With regards to my diet, I am still struggling with: Eating carbs and sweets   I have made the following changes to my activity/exercise since my last visit: Trying to walk more and take the stairs at work   With regards to my activity/exercise, I am still struggling with: Consistency         MEDICATIONS:   Current Outpatient Medications   Medication Sig Dispense Refill    acetaminophen (TYLENOL) 500 MG tablet       apremilast (OTEZLA) 30 MG tablet Take 1 tablet (30 mg) by mouth 2 times daily. Hold for signs of infection, and seek medical attention 180 tablet 3    Calcium Carbonate-Vitamin D 600-10 MG-MCG TABS       Cholecalciferol (VITAMIN D) 2000 units tablet Take 1 tablet by mouth daily      etanercept (ENBREL SURECLICK) 50 MG/ML autoinjector Inject 50 mg Subcutaneous every 7 days 4 mL 11    ibuprofen (ADVIL/MOTRIN) 200 MG tablet       Loratadine (CLARITIN PO) Take  by mouth.      meclizine 25 MG CHEW Take 25 mg by mouth as needed.      multivitamin w/minerals (THERA-VIT-M) tablet       omeprazole (PRILOSEC) 20 MG DR capsule Take 1 capsule (20 mg) by mouth 2 times daily 60 capsule 2    phentermine 15 MG capsule Take 1 capsule (15 mg) by mouth every morning. 90 capsule 0         ROS:  General  Fatigue: No  Sleep Quality: OK      PHYSICAL EXAM:  Objective    Ht 5' 5\" (1.651 m)   Wt 282 lb (127.9 kg)   LMP 08/28/2024 (Approximate)   BMI 46.93 kg/m    GENERAL: Healthy, " alert and no distress  EYES: Eyes grossly normal to inspection.  No discharge or erythema, or obvious scleral/conjunctival abnormalities.  RESP: No audible wheeze, cough, or visible cyanosis.  No visible retractions or increased work of breathing.    SKIN: Visible skin clear. No significant rash, abnormal pigmentation or lesions.  NEURO: Cranial nerves grossly intact.  Mentation and speech appropriate for age.  PSYCH: Mentation appears normal, affect normal/bright, judgement and insight intact, normal speech and appearance well-groomed.        Sincerely,    Leroy Sheth PA-C    20 minutes spent on the date of the encounter doing chart review, review of test results, patient visit and documentation

## 2024-09-27 ENCOUNTER — VIRTUAL VISIT (OUTPATIENT)
Dept: SURGERY | Facility: CLINIC | Age: 39
End: 2024-09-27
Payer: COMMERCIAL

## 2024-09-27 VITALS — HEIGHT: 65 IN | WEIGHT: 282 LBS | BODY MASS INDEX: 46.98 KG/M2

## 2024-09-27 DIAGNOSIS — E66.01 MORBID OBESITY (H): Primary | ICD-10-CM

## 2024-09-27 PROCEDURE — 99213 OFFICE O/P EST LOW 20 MIN: CPT | Mod: 95 | Performed by: PHYSICIAN ASSISTANT

## 2024-09-27 RX ORDER — PHENTERMINE HYDROCHLORIDE 15 MG/1
15 CAPSULE ORAL EVERY MORNING
Qty: 90 CAPSULE | Refills: 0 | Status: SHIPPED | OUTPATIENT
Start: 2024-09-27

## 2024-09-27 NOTE — PATIENT INSTRUCTIONS
"Nice to talk with you today! Thank you for allowing me the privilege of caring for you.   We hope we provided you with the excellent service you deserve.     To ensure the quality of our services you may receive a patient satisfaction survey from an independent monitoring company.  The greatest compliment you can give is \"Likely to Recommend\"      Below is our plan we discussed.-  ALISA Harpre      Continue with phentermine 15 mg    Please call [phone] and schedule a follow up in 3 months.  If you need to reach me sooner you can do so by calling [phone].    Have a great day!         Eat Better ? Move More ? Live Well    Eat 3 nutrient-rich meals each day    Don t skip meals--it will cause you to overeat later in the day!    Eating fiber (vegetables/fruits/whole grains) and protein with meals helps you stay full longer    Choose foods with less than 10 grams of sugar and 5 grams of fat per serving to prevent excess calories and weight gain  Eat around the same times each day to develop a routine eating schedule   Avoid snacking unless physically hungry.   Planned snacks: 1-2 times per day and no more than 150 calories    Eat protein first   Protein helps with healing, maintaining adequate muscle mass, reducing hunger and optimizing nutritional status   Aim for 60-80 grams of protein per day   Fill up on Fiber   Fiber comes from plants--fruits, veggies, whole grains, nuts/seeds and beans   Fiber is low in calories, high in phytonutrients and helps you stay full longer   Aim for 25-35 grams per day by eating fiber with meals and snacks  Eat [id number]   Take 20-30 minutes to eat each meal by taking small bites, chewing foods to applesauce consistency or 20-30 times before you swallow   Eating foods too fast can delay satiety/fullness signals and increase overeating   Slow down your eating by using toddler utensils, putting your fork/spoon down between bites and not watching TV or emailing during meals! "   Keep a Journal         Writing down what you eat, how you feel and when you are active helps you identify new changes to work on from week to week         Look for ways to cut 100 calories from your current diet 2-3 times per day  Drink 64 ounces of 0-Calorie drinks between meals   Water   Zero calorie Propel  or Vitamin Water     SoBe Lifewater  Zero Calories   Crystal Light , Sugar-Free Wilbert-Aid , and other sugar-free lemonade or flavored pittman   Keep Caffeine to less than 300mg per day ie: 3-6oz cups coffee     Work up to 45-60 minutes of physical activity most days of the week   Helps with losing weight and prevent regaining those extra pounds!    Do a combo of cardio (walking/water exercises) and strength training (lifting weights/Vinyasa yoga)    Avoid Mindless Eating   Be present when you eat--take note of the smell, taste and quality of your food   Make a list of alternative activities you could do to prevent eating out of boredom/stress  Go for a walk, call a friend, chew gum, paint your nails, re-organize the garage, etc

## 2024-10-10 NOTE — PROGRESS NOTES
Assessment and Plan:     1. Lumbar radiculopathy  Provided note allowing patient to return to work without restrictions.  She will continue physical therapy.    2. Rash  We will check KOLTON to rule out lupus.  I suspect contact dermatitis or allergic reaction.  Offered referral to allergist, but she declines.  Recommend over-the-counter Claritin or Zyrtec for symptomatic treatment.  - Antinuclear Antibody (KOLTON) Cascade    3. Fatigue, unspecified type  Discussed good sleep hygiene.  Will rule out vitamin deficiencies and thyroid disease.  She is content with the plan.  - Vitamin D, Total (25-Hydroxy)  - Thyroid Cascade  - Vitamin B12    4. Lipid screening  - Lipid Cascade    I spent 25 minutes with the patient with greater than 50% spent discussing symptoms, treatment options, and coordination of care.       Subjective:     Alicia is a 32 y.o. female presenting to the clinic for follow-up on back pain.  Patient presented to the clinic on 4/16/18 where she had developed back pain when she went to sit in a chair.  It radiated down her right buttock and lateral thigh to her right knee.  Patient was seen February 2016 for similar symptoms.  MRI of the lumbar spine showed a small right lateral disc herniation at L4-5 with mass-effect upon the right L4 nerve.   her symptoms improve with physical therapy.  I treated her recently with Medrol Dosepak and cyclobenzaprine.  Provided workability form.  Patient has been seen physical therapy.  She is following up today to return to work.  Patient states her symptoms have improved.  She does continue to experience a mild dull ache but feels as though she is able to work.  Patient states 6 weeks ago, she was wearing a mask at work when she developed a facial rash that lasted for 4-5 days.  It was pruritic.  3 weeks ago, she wore a different mask and had similar symptoms occur.  Patient describes a rash as erythematous, dry scaly, bumpy.  Patient feels as though it is in the  Reviewed by QA and ready to move forward.      "appearance of a butterfly so she is concerned of lupus.  She feels as though the rash is starting again today.  When she has the rash, she takes over-the-counter Claritin and Benadryl.  Patient has also been experiencing fatigue for 3 months.  She had normal hemogram and CMP in December.  She is interested in vitamin D and thyroid labs today.    Review of Systems: A complete 14 point review of systems was obtained and is negative or as stated in the history of present illness.    Social History     Social History     Marital status: Single     Spouse name: N/A     Number of children: N/A     Years of education: N/A     Occupational History     Not on file.     Social History Main Topics     Smoking status: Never Smoker     Smokeless tobacco: Never Used     Alcohol use No     Drug use: No     Sexual activity: Not on file     Other Topics Concern     Not on file     Social History Narrative       Active Ambulatory Problems     Diagnosis Date Noted     Abdominal Pain In The [place] Upper Belly (Epigastric)      Serum Enzyme Levels - AST (SGOT) Elevated      Abdominal Pain      Resolved Ambulatory Problems     Diagnosis Date Noted     No Resolved Ambulatory Problems     Past Medical History:   Diagnosis Date     Ulcer        Family History   Problem Relation Age of Onset     Hypertension         Objective:     /84  Pulse 68  Ht 5' 5\" (1.651 m)  Wt (!) 329 lb 1.6 oz (149.3 kg)  BMI 54.77 kg/m2    Patient is alert, in no obvious distress.   Skin: Warm, dry.  Her cheeks appear a little pink.   HEENT:  Head normocephalic, atraumatic.  Eyes normal. Ears normal.  Nose patent, mucosa pink.  Oropharynx mucosa pink.  No lesions or tonsillar enlargement.   Neck: Supple, no lymphadenopathy.   Lungs:  Clear to auscultation. Respirations even and unlabored.  No wheezing or rales noted.   Heart:  Regular rate and rhythm.  No murmurs.   Musculoskeletal: She has full range of motion bending at the waist.  She has no " difficulty twisting and turning.  She is able to heel toe walk without difficulty.  Straight leg raise is negative bilaterally.  She is nontender to palpation of the lumbar musculature.

## 2024-11-19 ENCOUNTER — LAB (OUTPATIENT)
Dept: LAB | Facility: CLINIC | Age: 39
End: 2024-11-19
Payer: COMMERCIAL

## 2024-11-19 DIAGNOSIS — L40.9 PSORIASIS: ICD-10-CM

## 2024-11-19 DIAGNOSIS — L40.50 PSORIATIC ARTHRITIS (H): ICD-10-CM

## 2024-11-19 LAB
ALBUMIN SERPL BCG-MCNC: 4 G/DL (ref 3.5–5.2)
ALT SERPL W P-5'-P-CCNC: 17 U/L (ref 0–50)
AST SERPL W P-5'-P-CCNC: 23 U/L (ref 0–45)
CREAT SERPL-MCNC: 0.85 MG/DL (ref 0.51–0.95)
CRP SERPL-MCNC: 11.3 MG/L
EGFRCR SERPLBLD CKD-EPI 2021: 89 ML/MIN/1.73M2
ERYTHROCYTE [DISTWIDTH] IN BLOOD BY AUTOMATED COUNT: 12.9 % (ref 10–15)
ERYTHROCYTE [SEDIMENTATION RATE] IN BLOOD BY WESTERGREN METHOD: 17 MM/HR (ref 0–20)
HCT VFR BLD AUTO: 42.3 % (ref 35–47)
HGB BLD-MCNC: 14.1 G/DL (ref 11.7–15.7)
MCH RBC QN AUTO: 30 PG (ref 26.5–33)
MCHC RBC AUTO-ENTMCNC: 33.3 G/DL (ref 31.5–36.5)
MCV RBC AUTO: 90 FL (ref 78–100)
PLATELET # BLD AUTO: 374 10E3/UL (ref 150–450)
RBC # BLD AUTO: 4.7 10E6/UL (ref 3.8–5.2)
WBC # BLD AUTO: 9.3 10E3/UL (ref 4–11)

## 2024-11-19 PROCEDURE — 86140 C-REACTIVE PROTEIN: CPT

## 2024-11-19 PROCEDURE — 36415 COLL VENOUS BLD VENIPUNCTURE: CPT

## 2024-11-19 PROCEDURE — 82565 ASSAY OF CREATININE: CPT

## 2024-11-19 PROCEDURE — 85652 RBC SED RATE AUTOMATED: CPT

## 2024-11-19 PROCEDURE — 82040 ASSAY OF SERUM ALBUMIN: CPT

## 2024-11-19 PROCEDURE — 85027 COMPLETE CBC AUTOMATED: CPT

## 2024-11-19 PROCEDURE — 84450 TRANSFERASE (AST) (SGOT): CPT

## 2024-11-19 PROCEDURE — 84460 ALANINE AMINO (ALT) (SGPT): CPT

## 2024-11-26 ENCOUNTER — LAB REQUISITION (OUTPATIENT)
Dept: LAB | Facility: CLINIC | Age: 39
End: 2024-11-26
Payer: COMMERCIAL

## 2024-11-26 DIAGNOSIS — M25.561 PAIN IN RIGHT KNEE: ICD-10-CM

## 2024-11-26 LAB
APPEARANCE FLD: ABNORMAL
CELL COUNT BODY FLUID SOURCE: ABNORMAL
COLOR FLD: YELLOW
CRYSTALS SNV MICRO: NORMAL
LYMPHOCYTES NFR FLD MANUAL: 4 %
MONOS+MACROS NFR FLD MANUAL: 31 %
NEUTS BAND NFR FLD MANUAL: 66 %
WBC # FLD AUTO: ABNORMAL /UL

## 2024-11-26 PROCEDURE — 87205 SMEAR GRAM STAIN: CPT | Mod: ORL | Performed by: PHYSICIAN ASSISTANT

## 2024-11-26 PROCEDURE — 87075 CULTR BACTERIA EXCEPT BLOOD: CPT | Mod: ORL | Performed by: PHYSICIAN ASSISTANT

## 2024-11-26 PROCEDURE — 89060 EXAM SYNOVIAL FLUID CRYSTALS: CPT | Mod: ORL | Performed by: PHYSICIAN ASSISTANT

## 2024-12-01 LAB
BACTERIA SNV CULT: NO GROWTH
GRAM STAIN RESULT: NORMAL
GRAM STAIN RESULT: NORMAL

## 2024-12-10 LAB — BACTERIA SNV CULT: NORMAL

## 2025-01-08 ENCOUNTER — TELEPHONE (OUTPATIENT)
Dept: RHEUMATOLOGY | Facility: CLINIC | Age: 40
End: 2025-01-08
Payer: COMMERCIAL

## 2025-01-08 NOTE — TELEPHONE ENCOUNTER
Prior Authorization Approval    Medication: OTEZLA 30 MG PO TABS  Authorization Effective Date: 12/9/2024  Authorization Expiration Date: 1/8/2026  Approved Dose/Quantity: 60 tabs per 30 days  Reference #: [id number]   Insurance Company: [employer] [place] - Phone [phone] Fax [phone]  Expected CoPay: $    CoPay Card Available:      Financial Assistance Needed: No  Which [facility] filling the prescription[facility] - [place], [place] - [address]  Pharmacy Notified: Not needed  Patient Notified: Not needed

## 2025-01-08 NOTE — TELEPHONE ENCOUNTER
PA Initiation    Medication: OTEZLA 30 MG PO TABS  Insurance Company: [employer] [place] - Phone [phone] Fax [phone]  Pharmacy Filling the Rx: [facility] - [place], [place] - [address]  Filling Pharmacy Phone:    Filling Pharmacy Fax:    Start Date: 1/8/2025    [id number]

## 2025-01-08 NOTE — TELEPHONE ENCOUNTER
PA Initiation    Medication: ENBREL SURECLICK 50 MG/ML SC SOAJ  Insurance Company: [facility] - Phone [phone] Fax [phone]  Pharmacy Filling the Rx: [facility] - [place], [place] - [address]  Filling Pharmacy Phone:    Filling Pharmacy Fax:    Start Date: 1/8/2025    [id number]

## 2025-01-10 ENCOUNTER — MYC REFILL (OUTPATIENT)
Dept: SURGERY | Facility: CLINIC | Age: 40
End: 2025-01-10
Payer: COMMERCIAL

## 2025-01-10 DIAGNOSIS — E66.01 MORBID OBESITY (H): ICD-10-CM

## 2025-01-11 ENCOUNTER — HEALTH MAINTENANCE LETTER (OUTPATIENT)
Age: 40
End: 2025-01-11

## 2025-01-13 RX ORDER — PHENTERMINE HYDROCHLORIDE 15 MG/1
15 CAPSULE ORAL EVERY MORNING
Qty: 90 CAPSULE | Refills: 0 | Status: SHIPPED | OUTPATIENT
Start: 2025-01-13

## 2025-01-14 ENCOUNTER — LAB (OUTPATIENT)
Dept: LAB | Facility: CLINIC | Age: 40
End: 2025-01-14
Payer: COMMERCIAL

## 2025-01-14 DIAGNOSIS — L40.50 PSORIATIC ARTHRITIS (H): ICD-10-CM

## 2025-01-14 DIAGNOSIS — L40.9 PSORIASIS: ICD-10-CM

## 2025-01-14 LAB
ERYTHROCYTE [DISTWIDTH] IN BLOOD BY AUTOMATED COUNT: 13.1 % (ref 10–15)
ERYTHROCYTE [SEDIMENTATION RATE] IN BLOOD BY WESTERGREN METHOD: 17 MM/HR (ref 0–20)
HCT VFR BLD AUTO: 43.6 % (ref 35–47)
HGB BLD-MCNC: 14.7 G/DL (ref 11.7–15.7)
MCH RBC QN AUTO: 30.4 PG (ref 26.5–33)
MCHC RBC AUTO-ENTMCNC: 33.7 G/DL (ref 31.5–36.5)
MCV RBC AUTO: 90 FL (ref 78–100)
PLATELET # BLD AUTO: 360 10E3/UL (ref 150–450)
RBC # BLD AUTO: 4.84 10E6/UL (ref 3.8–5.2)
WBC # BLD AUTO: 10.5 10E3/UL (ref 4–11)

## 2025-01-14 PROCEDURE — 82040 ASSAY OF SERUM ALBUMIN: CPT

## 2025-01-14 PROCEDURE — 84460 ALANINE AMINO (ALT) (SGPT): CPT

## 2025-01-14 PROCEDURE — 86140 C-REACTIVE PROTEIN: CPT

## 2025-01-14 PROCEDURE — 85027 COMPLETE CBC AUTOMATED: CPT

## 2025-01-14 PROCEDURE — 85652 RBC SED RATE AUTOMATED: CPT

## 2025-01-14 PROCEDURE — 84450 TRANSFERASE (AST) (SGOT): CPT

## 2025-01-14 PROCEDURE — 82565 ASSAY OF CREATININE: CPT

## 2025-01-14 PROCEDURE — 36415 COLL VENOUS BLD VENIPUNCTURE: CPT

## 2025-01-15 LAB
ALBUMIN SERPL BCG-MCNC: 4.2 G/DL (ref 3.5–5.2)
ALT SERPL W P-5'-P-CCNC: 21 U/L (ref 0–50)
AST SERPL W P-5'-P-CCNC: 21 U/L (ref 0–45)
CREAT SERPL-MCNC: 0.82 MG/DL (ref 0.51–0.95)
CRP SERPL-MCNC: 9.96 MG/L
EGFRCR SERPLBLD CKD-EPI 2021: >90 ML/MIN/1.73M2

## 2025-01-27 ENCOUNTER — TELEPHONE (OUTPATIENT)
Dept: RHEUMATOLOGY | Facility: CLINIC | Age: 40
End: 2025-01-27

## 2025-01-27 NOTE — TELEPHONE ENCOUNTER
Patient Contacted for the patient to call back and schedule the following:    Appointment type: Return Rheum  Provider: Radha Gong  Return date: near future  Specialty phone number: [phone]  Additional appointment(s) needed:   Additonal Notes:     Provider is out today, 1/27/2025. Patient needs to be seen sooner than June 2025.    Please call her to reschedule the appointment.    Thank you.    Tyesha SMITH/Imelda Procedure    [facility]   Neurology, NeuroSurgery, NeuroPsychology, Pain Management and Cardiology Specialties  Medical/Surgical Adult Specialties

## 2025-01-30 NOTE — TELEPHONE ENCOUNTER
Prior Authorization Approval    Medication: ENBREL SURECLICK 50 MG/ML SC SOAJ  Authorization Effective Date: 12/10/2024  Authorization Expiration Date: 1/9/2026  Approved Dose/Quantity:   Reference #: [id number]   Insurance Company: [employer] [place] - Phone [phone] Fax [phone]  Expected CoPay: $    CoPay Card Available: Yes    Financial Assistance Needed:   Which [facility] the prescription[facility] - [place], [place] - [address]  Pharmacy Notified: renewal  Patient Notified: renewal

## 2025-02-25 ENCOUNTER — OFFICE VISIT (OUTPATIENT)
Dept: URGENT CARE | Facility: URGENT CARE | Age: 40
End: 2025-02-25
Payer: COMMERCIAL

## 2025-02-25 VITALS
RESPIRATION RATE: 24 BRPM | TEMPERATURE: 98.6 F | WEIGHT: 287 LBS | SYSTOLIC BLOOD PRESSURE: 146 MMHG | OXYGEN SATURATION: 99 % | BODY MASS INDEX: 47.76 KG/M2 | HEART RATE: 97 BPM | DIASTOLIC BLOOD PRESSURE: 94 MMHG

## 2025-02-25 DIAGNOSIS — J35.8 TONSIL STONE: ICD-10-CM

## 2025-02-25 DIAGNOSIS — J03.90 TONSILLITIS: Primary | ICD-10-CM

## 2025-02-25 PROCEDURE — 3077F SYST BP >= 140 MM HG: CPT | Performed by: PHYSICIAN ASSISTANT

## 2025-02-25 PROCEDURE — 3080F DIAST BP >= 90 MM HG: CPT | Performed by: PHYSICIAN ASSISTANT

## 2025-02-25 PROCEDURE — 99213 OFFICE O/P EST LOW 20 MIN: CPT | Performed by: PHYSICIAN ASSISTANT

## 2025-02-25 RX ORDER — AMOXICILLIN 500 MG/1
500 CAPSULE ORAL 2 TIMES DAILY
Qty: 20 CAPSULE | Refills: 0 | Status: SHIPPED | OUTPATIENT
Start: 2025-02-25 | End: 2025-03-07

## 2025-02-25 ASSESSMENT — ENCOUNTER SYMPTOMS
SORE THROAT: 1
SHORTNESS OF BREATH: 0
FATIGUE: 0
NAUSEA: 0
CARDIOVASCULAR NEGATIVE: 1
SINUS PRESSURE: 0
WHEEZING: 0
VOMITING: 0
PALPITATIONS: 0
COUGH: 0
RHINORRHEA: 0
DIARRHEA: 0
SINUS PAIN: 0
FEVER: 0
CHILLS: 0

## 2025-02-25 NOTE — PROGRESS NOTES
Marco Antonio Vargas is a 39 year old, presenting for the following health issues:  Throat Problem (On Sunday pt removed a tonsil stone the size of a marble or pencil erase, pain on the left side of throat, tingling on left side of face - possible infection. )    HPI   Acute Illness  Acute illness concerns:   Onset/Duration: 2days ago  Symptoms:  Fever: No  Chills/Sweats: No  Headache (location?): No  Sinus Pressure: No  Conjunctivitis:  No  Ear Pain: no  Rhinorrhea: No  Congestion: No  Sore Throat: YES- removed a tonsil stone over the L tonsil 2days ago and now has sore throat and L facial tingling.  No numbness or weakness. No HA, visual disturbances, dizziness, one sided weakness or slurred speech.    Cough: no  Wheeze: No  Decreased Appetite: No  Nausea: No  Vomiting: No  Diarrhea: No  Dysuria/Freq.: No  Dysuria or Hematuria: No  Fatigue/Achiness: No  Sick/Strep Exposure: No  Therapies tried and outcome: rest,fluids,warm salt water gargles,ibuprofen with minimal relief    Patient Active Problem List   Diagnosis    Morbid obesity (H)    Psoriatic arthritis (H)    Psoriasis    DDD (degenerative disc disease), lumbar    Primary osteoarthritis of right knee    Irritable bowel syndrome with both constipation and diarrhea    Neutrophilia    Nonspecific elevation of levels of transaminase or lactic acid dehydrogenase (LDH)    Seasonal allergies    Thrombocytosis    Vitamin D deficiency    Other immunodeficiencies     Current Outpatient Medications   Medication Sig Dispense Refill    acetaminophen (TYLENOL) 500 MG tablet       apremilast (OTEZLA) 30 MG tablet Take 1 tablet (30 mg) by mouth 2 times daily. Hold for signs of infection, and seek medical attention 180 tablet 3    Calcium Carbonate-Vitamin D 600-10 MG-MCG TABS       Cholecalciferol (VITAMIN D) 2000 units tablet Take 1 tablet by mouth daily      etanercept (ENBREL SURECLICK) 50 MG/ML autoinjector Inject 50 mg Subcutaneous every 7 days 4 mL 11    ibuprofen  (ADVIL/MOTRIN) 200 MG tablet       Loratadine (CLARITIN PO) Take  by mouth.      meclizine 25 MG CHEW Take 25 mg by mouth as needed.      multivitamin w/minerals (THERA-VIT-M) tablet       omeprazole (PRILOSEC) 20 MG DR capsule Take 1 capsule (20 mg) by mouth 2 times daily 60 capsule 2    phentermine 15 MG capsule Take 1 capsule (15 mg) by mouth every morning. 90 capsule 0     No current facility-administered medications for this visit.        Allergies   Allergen Reactions    Sulfasalazine Rash    Tirzepatide Swelling and Rash     Review of Systems   Constitutional:  Negative for chills, fatigue and fever.   HENT:  Positive for sore throat. Negative for congestion, ear pain, rhinorrhea, sinus pressure and sinus pain.    Respiratory:  Negative for cough, shortness of breath and wheezing.    Cardiovascular: Negative.  Negative for chest pain, palpitations and leg swelling.   Gastrointestinal:  Negative for diarrhea, nausea and vomiting.   All other systems reviewed and are negative.          Objective    BP (!) 146/94 (BP Location: Left arm, Patient Position: Sitting, Cuff Size: Adult Large)   Pulse 97   Temp 98.6  F (37  C) (Tympanic)   Resp 24   Wt 130.2 kg (287 lb)   SpO2 99%   BMI 47.76 kg/m    Body mass index is 47.76 kg/m .  Physical Exam  Vitals and nursing note reviewed.   Constitutional:       General: She is not in acute distress.     Appearance: Normal appearance. She is well-developed. She is obese. She is not ill-appearing.   HENT:      Head: Normocephalic and atraumatic.      Comments: TMs are intact without any erythema or bulging bilaterally.  Airway is patent.     Nose: Nose normal.      Mouth/Throat:      Lips: Pink.      Mouth: Mucous membranes are moist.      Pharynx: Uvula midline. Pharyngeal swelling and posterior oropharyngeal erythema present. No oropharyngeal exudate or uvula swelling.      Tonsils: No tonsillar exudate or tonsillar abscesses. 2+ on the right. 2+ on the left.   Eyes:       General: No scleral icterus.     Extraocular Movements: Extraocular movements intact.      Conjunctiva/sclera: Conjunctivae normal.      Pupils: Pupils are equal, round, and reactive to light.   Neck:      Thyroid: No thyromegaly.   Cardiovascular:      Rate and Rhythm: Normal rate and regular rhythm.      Pulses: Normal pulses.      Heart sounds: Normal heart sounds, S1 normal and S2 normal. No murmur heard.     No friction rub. No gallop.   Pulmonary:      Effort: Pulmonary effort is normal. No accessory muscle usage, respiratory distress or retractions.      Breath sounds: Normal breath sounds and air entry. No stridor. No decreased breath sounds, wheezing, rhonchi or rales.   Musculoskeletal:      Cervical back: Normal range of motion and neck supple.   Lymphadenopathy:      Head:      Right side of head: No tonsillar adenopathy.      Left side of head: Tonsillar adenopathy present.      Cervical: No cervical adenopathy.   Skin:     General: Skin is warm and dry.      Nails: There is no clubbing.   Neurological:      Mental Status: She is alert and oriented to person, place, and time.      GCS: GCS eye subscore is 4. GCS verbal subscore is 5. GCS motor subscore is 6.      Cranial Nerves: Cranial nerves 2-12 are intact.      Sensory: Sensation is intact.      Motor: Motor function is intact.      Coordination: Coordination is intact.      Deep Tendon Reflexes: Reflexes are normal and symmetric.   Psychiatric:         Mood and Affect: Mood normal.         Behavior: Behavior normal.         Thought Content: Thought content normal.         Judgment: Judgment normal.             Assessment/Plan:  Tonsillitis:  Will treat for tonsillitis with [id number] based on H&P.  No more foreign body insertion.  Recommend ice pack, tylenol/ibuprofen prn pain/fever, warm salt water gargles, lozenges or cough drops.  Recheck in clinic if symptoms worsen or if symptoms do not improve. Will send to ENT if no improvement. To  the ER is she develops HA, visual disturbances, dizziness, one sided weakness/numbness/tingling or slurred speech.    -     Adult ENT  Referral; Future  -     amoxicillin (AMOXIL) 500 MG capsule; Take 1 capsule (500 mg) by mouth 2 times daily for 10 days.    Tonsil stone  -     Adult ENT  Referral; Future        Anita See ALISA Canas

## 2025-02-26 ENCOUNTER — MYC MEDICAL ADVICE (OUTPATIENT)
Dept: RHEUMATOLOGY | Facility: CLINIC | Age: 40
End: 2025-02-26
Payer: COMMERCIAL

## 2025-02-26 NOTE — TELEPHONE ENCOUNTER
Patient declined to schedule an appointment at this time with another provider. Symptoms have been stable.      USZETTE Banks  Rheumatology/Infectious disease  [facility]   Rheumatology ph:[phone]  Infectious Disease ph:[phone]

## 2025-03-13 NOTE — PROGRESS NOTES
"Alicia is a 39 year old who is being evaluated via a billable video visit.      The patient has been notified of following:     \"This video visit will be conducted via a call between you and your physician/provider. We have found that certain health care needs can be provided without the need for an in-person physical exam.  This service lets us provide the care you need with a video conversation.  If a prescription is necessary we can send it directly to your pharmacy.  If lab work is needed we can place an order for that and you can then stop by our lab to have the test done at a later time.    Video visits are billed at different rates depending on your insurance coverage.  Please reach out to your insurance provider with any questions.    If during the course of the call the physician/provider feels a video visit is not appropriate, you will not be charged for this service.\"    Patient has given verbal consent for Video visit? {YES-NO  Default Yes:4444::\"Yes\"}    How would you like to obtain your AVS? {AVS Preference:930629}    If the video visit is dropped, the invitation should be resent by: {video visit invitation:206011}    Will anyone else be joining your video visit? {:[id number]}    I{If patient encounters technical issues they should call [phone] :[id number]}    Video-Visit Details    Type of service:  Video Visit    Originating Location (pt. Location): {video visit patient location:514634::\"Home\"}    Distant Location (provider location):   {[place] Provider Location:536880}    Platform used for Video Visit: {Virtual Visit Platforms:973724::\"[email or web address]\"}    Video Start Time: {video visit start/end time for provider to select:021848}    Video End Time:{video visit start/end time for provider to select:320796}    " "place an order for that and you can then stop by our lab to have the test done at a later time.    Video visits are billed at different rates depending on your insurance coverage.  Please reach out to your insurance provider with any questions.    If during the course of the call the physician/provider feels a video visit is not appropriate, you will not be charged for this service.\"    Patient has given verbal consent for Video visit? Yes    How would you like to obtain your AVS? [employer]    If the video visit is dropped, the invitation should be resent by: Text to cell phone: [phone]    Will anyone else be joining your video visit? No    I    Video-Visit Details    Type of service:  Video Visit    Originating Location (pt. Location): Home    Distant Location (provider location):   Off-Site - Provider Home Office    Platform used for Video Visit: [employer]    Video Start Time: 11:08 AM    Video End Time: 11:31 AM      3/20/2025      Return Medical Weight Management Note     Alicia Smith  MRN:  [id number]  :  1985    Assessment & Plan   Problem List Items Addressed This Visit       Class 3 obesity - Primary     Weight maintaining on Phentermine no SE.  Has weight plateau increased sugar cravings.  Will start Topiramate.  Cr WNL 2025. Birth Control Plan: abstains-on additional medication that is teratogenic.  Risks/ benefits and possible side effects were discussed and questions were answered. Written information was given. BMP in 3 mo.    BP WNL at recent check at work.  Was elevated when seen in clinic visit for Pt will check BP/P at work and send picture of result through [email or web address].             Relevant Medications    topiramate (TOPAMAX) 25 MG tablet    Other Relevant Orders    Basic metabolic panel        Antiobesity medication history: 3/20/2025   Phentermine (Lomaira, Adipex) Taking 15 mg daily   Phentermine/Topiramate (Qsymia)    Bupropion/Naltrexone (Contrave)    Liraglutide (Saxenda)  "   Semaglutide (Wegovy) Avoid due to allergic Rxn   Tirzapatide (Zepbound) Avoid due to allergic Rxn  Developed rash and scratchy throat   Orlistat (Xenical/Terry)    Off-Label Medications for Obesity  Semaglutide (Ozempic)    Tirzapatide (Mounjaro) Not diabetic    Bupropion (Wellbutrin)    Metformin(Glucophage)    Topiramate (Topamax) Candidate, no hx of kidney stones, glaucoma, abstains from sexual intercourse, on additional medications that are teratogenic.    Naltrexone        PATIENT INSTRUCTIONS:  Continue Phentermine.   Check BP/P at work and send picture of result through [employer].    Start Topiramate (Remember to drink plenty of fluids daily)   Week 1:Take 1 tablet (25 mg) at bedtime  Week 2 Take 2 tablets (50 mg) at bedtime  Week 3:Take 3 tablets (75 mg) at bedtime Stay at 3 tabs until you are seen again.   Labs ordered.  Please call [phone] to set up a lab appt.     Goals:  Follow up with dietician    Follow up:    Call [phone] to schedule next visit in July and Oct.     36 minutes spent on the date of the encounter doing chart review, history and exam, result review, counseling, developing plan of care, documentation, and further activities as noted      INTERVAL HISTORY:  Alicia returns for medical weight management follow up.  Last seen on 9/27/2025 by Leroy Sheth PA-C.  On phentermine 15 mg.  Has been maintaining weight.  Less emphasis on lifestyle changes. Went on a 3 week vacation/ holidays.  Arthritis has been acting up.  Does not feel it was as effective as in the beginning.      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    Sweets come into the play:  at the end your shift or evening     WEIGHT METRICS:  Body mass index is 47.59 kg/m .   Current Weight: 286 lb (129.7 kg) (Patient reported)  Last Visits Weight: 282 lb (127.9 kg)  Initial Weight (lbs): 335 lbs  Cumulative  weight loss (lbs): 49  Weight Loss Percentage: 14.63%    Wt Readings from Last 10 Encounters:   03/20/25 286 lb (129.7 kg)   02/25/25 287 lb (130.2 kg)   09/27/24 282 lb (127.9 kg)   09/09/24 287 lb 12.8 oz (130.5 kg)   06/06/24 298 lb (135.2 kg)   05/17/24 306 lb (138.8 kg)   05/15/24 306 lb (138.8 kg)   04/03/24 314 lb (142.4 kg)   03/08/24 320 lb (145.2 kg)   02/08/24 (!) 335 lb (152 kg)                 3/19/2025     2:39 PM   Weight Loss Medication History Reviewed With Patient   Which weight loss medications are you currently taking on a regular basis? Phentermine   Are you having any side effects from the weight loss medication that we have prescribed you? No           3/19/2025     2:39 PM   Changes and Difficulties   I have made the following changes to my diet since my last visit: Tried to choose the healthier options   With regards to my diet, I am still struggling with: Portion size. Sweets.   I have made the following changes to my activity/exercise since my last visit: Taking the stairs more at work but chronic conditions are currently limiting further exercise.   With regards to my activity/exercise, I am still struggling with: Making exercise a routine   Evening sweets.          LABS:  Hemoglobin A1C   Date Value Ref Range Status   02/09/2024 5.3 0.0 - 5.6 % Final     Comment:     Normal <5.7%   Prediabetes 5.7-6.4%    Diabetes 6.5% or higher     Note: Adopted from ADA consensus guidelines.     Creatinine   Date Value Ref Range Status   01/14/2025 0.82 0.51 - 0.95 mg/dL Final   07/08/2021 0.70 0.52 - 1.04 mg/dL Final     GFR Estimate   Date Value Ref Range Status   01/14/2025 >90 >60 mL/min/1.73m2 Final     Comment:     eGFR calculated using 2021 CKD-EPI equation.   07/08/2021 >90 >60 mL/min/[1.73_m2] Final     Comment:     Non  GFR Calc  Starting 12/18/2018, serum creatinine based estimated GFR (eGFR) will be   calculated using the Chronic Kidney Disease Epidemiology Collaboration  "  (CKD-EPI) equation.       Carbon Dioxide (CO2)   Date Value Ref Range Status   01/19/2024 26 22 - 29 mmol/L Final   04/21/2021 22 22 - 31 mmol/L Final     Chloride   Date Value Ref Range Status   01/19/2024 106 98 - 107 mmol/L Final   04/21/2021 105 98 - 107 mmol/L Final     Urea Nitrogen   Date Value Ref Range Status   01/19/2024 14.1 6.0 - 20.0 mg/dL Final   04/21/2021 15 8 - 22 mg/dL Final     ALT   Date Value Ref Range Status   01/14/2025 21 0 - 50 U/L Final   07/08/2021 26 0 - 50 U/L Final     AST   Date Value Ref Range Status   01/14/2025 21 0 - 45 U/L Final   07/08/2021 12 0 - 45 U/L Final     TSH   Date Value Ref Range Status   04/21/2021 3.19 0.30 - 5.00 uIU/mL Final      Nurse Visit [facility] 127/79  HR 78  BP Readings from Last 6 Encounters:   02/25/25 (!) 146/94-  visit for Tonsillitis and tonsil stone.    09/09/24 128/85   06/18/24 123/84   06/06/24 (!) 123/90   05/15/24 129/82   04/03/24 122/86       Pulse Readings from Last 6 Encounters:   02/25/25 97   09/09/24 77   06/18/24 64   06/06/24 74   05/15/24 63   04/03/24 97       PE:  Ht 5' 5\" (1.651 m)   Wt 286 lb (129.7 kg)   BMI 47.59 kg/m    GENERAL: Healthy, alert and no distress  RESP: No audible wheeze, cough, or visible cyanosis.  No visible retractions or increased work of breathing.    SKIN: Visible skin clear. No significant rash, abnormal pigmentation or lesions.  PSYCH: Mentation appears normal, affect normal/bright, judgement and insight intact            "

## 2025-03-20 ENCOUNTER — VIRTUAL VISIT (OUTPATIENT)
Dept: SURGERY | Facility: CLINIC | Age: 40
End: 2025-03-20
Payer: COMMERCIAL

## 2025-03-20 VITALS — WEIGHT: 286 LBS | HEIGHT: 65 IN | BODY MASS INDEX: 47.65 KG/M2

## 2025-03-20 DIAGNOSIS — E66.813 CLASS 3 OBESITY: Primary | ICD-10-CM

## 2025-03-20 RX ORDER — PHENTERMINE HYDROCHLORIDE 15 MG/1
15 CAPSULE ORAL EVERY MORNING
Qty: 90 CAPSULE | Refills: 0 | Status: SHIPPED | OUTPATIENT
Start: 2025-03-20

## 2025-03-20 RX ORDER — TOPIRAMATE 25 MG/1
TABLET, FILM COATED ORAL
Qty: 270 TABLET | Refills: 1 | Status: SHIPPED | OUTPATIENT
Start: 2025-03-20

## 2025-03-20 NOTE — ASSESSMENT & PLAN NOTE
Weight maintaining on Phentermine no SE.  Has weight plateau increased sugar cravings.  Will start Topiramate.  Cr WNL 1/2025. Birth Control Plan: abstains-on additional medication that is teratogenic.  Risks/ benefits and possible side effects were discussed and questions were answered. Written information was given. BMP in 3 mo.    BP WNL at recent check at work.  Was elevated when seen in clinic visit for Pt will check BP/P at work and send picture of result through [employer].

## 2025-03-20 NOTE — PATIENT INSTRUCTIONS
Nice to talk with you today. Below is the plan discussed.-  ALISA Will      Pt Instructions:  Continue Phentermine.   Check BP/P at work and send picture of result through [employer].    Start Topiramate (Remember to drink plenty of fluids daily)   Week 1:Take 1 tablet (25 mg) at bedtime  Week 2 Take 2 tablets (50 mg) at bedtime  Week 3:Take 3 tablets (75 mg) at bedtime Stay at 3 tabs until you are seen again.   Labs ordered.  Please call [phone] to set up a lab appt.     Goals:  Follow up with dietician    Follow up:    Call [phone] to schedule next visit in July and Oct.     TOPIRAMATE (TOPAMAX)    Topiramate (Topamax) is a medication that is used most often to treat migraine headaches or for seizures. It has also been found to help with weight loss. Although it's not currently FDA approved for weight loss, it has been used safely for a number of years to help people who are carrying extra weight.   Topiramate helps with weight loss by working on areas of the brain to quiet down signals related to eating.     Topiramate may make you:    >feel less interest in eating in between meals   >think less about food and eating   >find it easier to push the plate away   >find giving up pop easier    >have an easier time eating less    For some of our patients, the pills work right away. They feel and think quite differently about food. Other patients don't feel much of a change but find in fact they have lost weight! Like all weight loss medications, topiramate works best when you help it work.  This means:   1) Have less tempting high calorie (fattening) food around the house or office    2) Have lower calorie food (fruits, vegetables,low fat meats and dairy) for snacks    3) Eat out only one time or less each week.   4) Eat your meals at a table with the TV or computer off.    Side-effects Topiramate is generally well tolerated. The main side-effects we see are:   Tingling in hands,feet, or face (usually not  very troublesome)   Mental confusion and word finding trouble (about 10% of patients have this.)     Feeling sleepy or a bit dopey- this goes away very soon after starting.    One of the dangers of topiramate is the possibility of birth defects--if you get pregnant when you are on it, there is the risk that your baby will be born with a cleft lip or palate.  If you are on topiramate and of child bearing age, you need to be on a reliable form of birth control or refrain from sexual intercourse.     For any questions or concerns please send a [employer] message to our team or call our weight management call center at [phone] during regular business hours. For questions during evenings or weekends your messages will be addressed during the next business day.  For emergencies please call 911 or seek immediate medical care.       Strength Training  Strength training is exercise that involves your own body weight or equipment to build muscle, endurance, and strength. Increasing muscle helps increase your metabolism.     Muscle Conditioning: Helping You Get and Stay Fit  [email or web address]://www.fvfiles.com/[email or web address]    Muscle Conditionin Exercises    Resistance Training with Free Weights: 3 Exercises    Resistance Training with Surgical Tubin Exercises    Seated Exercises for Arms and Legs: 11 Exercises    Stretchin Exercises    No Equipment Home Exercise Lists from [facility] [employer]   [email or web address].org/public/upload/files/[email or web address]_Etvpahue.pdf    Strength training YouTube workouts  [email or web address]

## 2025-03-23 ENCOUNTER — LAB (OUTPATIENT)
Dept: LAB | Facility: CLINIC | Age: 40
End: 2025-03-23
Payer: COMMERCIAL

## 2025-03-23 DIAGNOSIS — E66.813 CLASS 3 OBESITY: ICD-10-CM

## 2025-03-23 LAB
ANION GAP SERPL CALCULATED.3IONS-SCNC: 11 MMOL/L (ref 7–15)
BUN SERPL-MCNC: 19.1 MG/DL (ref 6–20)
CALCIUM SERPL-MCNC: 9.6 MG/DL (ref 8.8–10.4)
CHLORIDE SERPL-SCNC: 105 MMOL/L (ref 98–107)
CREAT SERPL-MCNC: 0.75 MG/DL (ref 0.51–0.95)
EGFRCR SERPLBLD CKD-EPI 2021: >90 ML/MIN/1.73M2
GLUCOSE SERPL-MCNC: 77 MG/DL (ref 70–99)
HCO3 SERPL-SCNC: 23 MMOL/L (ref 22–29)
POTASSIUM SERPL-SCNC: 4.8 MMOL/L (ref 3.4–5.3)
SODIUM SERPL-SCNC: 139 MMOL/L (ref 135–145)

## 2025-03-23 PROCEDURE — 80048 BASIC METABOLIC PNL TOTAL CA: CPT

## 2025-03-23 PROCEDURE — 36415 COLL VENOUS BLD VENIPUNCTURE: CPT

## 2025-04-21 DIAGNOSIS — L40.50 PSORIATIC ARTHRITIS (H): Primary | ICD-10-CM

## 2025-04-21 NOTE — PROGRESS NOTES
Rheumatology lab orders placed.    Sent [employer] message reminder to Annamaria José. NGUYEN,   Adult Rheumatology

## 2025-04-23 ENCOUNTER — VIRTUAL VISIT (OUTPATIENT)
Dept: PHARMACY | Facility: CLINIC | Age: 40
End: 2025-04-23
Attending: NURSE PRACTITIONER
Payer: COMMERCIAL

## 2025-04-23 DIAGNOSIS — L40.9 PSORIASIS: ICD-10-CM

## 2025-04-23 DIAGNOSIS — L40.50 PSORIATIC ARTHRITIS (H): Primary | ICD-10-CM

## 2025-04-23 NOTE — PROGRESS NOTES
Medication Therapy Management (MTM) Encounter    ASSESSMENT:                            Medication Adherence/Access: No issues identified.    Psoriatic Arthritis/Psoriasis: Patient continues to achieve benefit from treatment and will continue therapy as prescribed. She is due for routine lab monitoring and patient advised to complete now. MTM to send in a 1-month supply of Enbrel as patient is about to run out of medication. Patient understands that labs are needed for additional refills. Rheumatology follow-up is scheduled in October. Encouraged patient to reach out sooner with questions or concerns.     PLAN:                            Continue your current medications:  Enbrel 50 mg subcutaneous injections every 7 days.  I sent in a 1-month supply. Labs are needed for additional refills.   Otezla 30 mg twice daily.     Complete routine lab monitoring now.  Lab orders have been placed. Patient to make lab appointment.     Follow-up: with Radha Gong NP on 10/15/25; with [facility] pharmacist on 12/29/25.    SUBJECTIVE/OBJECTIVE:                          Alicia Smith is a 39 year old female seen for a follow-up visit.    Reason for visit: Enbrel and Otezla follow-up.    Allergies/ADRs: Reviewed in chart  Past Medical History: Reviewed in chart  Tobacco: She reports that she has never smoked. She has never been exposed to tobacco smoke. She has never used smokeless tobacco.  Alcohol: Occasional, more often in summers     Medication Adherence/Access: no issues reported.    Psoriatic Arthritis/Psoriasis  Enbrel 50 mg subcutaneous injection every 7 days   Otezla 30 mg twice daily   Acetaminophen 500-1000 mg as needed  Ibuprofen 200-400 mg as needed     Patient reports her medications continue to work well. Still feeling much better than before starting treatment. Joint pain is controlled in her hands, but has been more active in her right knee. Mentioned symptoms worsened over the winter and she is scheduled to see  orthopedics coming up for evaluation. She reports no concerns with psoriasis. No issues with injections, adverse effects, or medication access. Has 1 Enbrel dose at home that is due this weekend. Able to complete labs early next week.     Previous therapies: sulfasalazine    Last lab monitoring completed: 1/14/25    Today's Vitals: There were no vitals taken for this visit.  ----------------    I spent 15 minutes with this patient today. All changes were made via collaborative practice agreement with Radha Gong NP.     A summary of these recommendations was sent via [employer].    Kaylee PaniaguaD  Medication Therapy Management Pharmacist  [facility]  Phone: [phone]     Telemedicine Visit Details  The patient's medications can be safely assessed via a telemedicine encounter.  Type of service:  Telephone visit  Originating Location (pt. Location): Home    Distant Location (provider location):  Off-site  Start Time:  10:00 AM  End Time:  10:15 AM     Medication Therapy Recommendations  No medication therapy recommendations to display

## 2025-04-23 NOTE — PATIENT INSTRUCTIONS
"Recommendations from today's MTM visit:                                                       Continue your current medications:  Enbrel 50 mg subcutaneous injections every 7 days.  I sent in a 1-month supply. Labs are needed for additional refills.  Otezla 30 mg twice daily.     Complete routine lab monitoring now.  Lab orders have been placed. Patient to make lab appointment.     Follow-up: with Radha Gong NP on 10/15/25; with MTM pharmacist on 12/29/25.    It was great speaking with you today.  I value your experience and would be very thankful for your time in providing feedback in our clinic survey. In the next few days, you may receive an email or text message from [employer] with a link to a survey related to your  clinical pharmacist.\"     To schedule another MTM appointment, please call the clinic directly or you may call the MTM scheduling line at [phone].    My Clinical Pharmacist's contact information:                                                      Please feel free to contact me with any questions or concerns you have.      Jacoby Sheikh PharmD  Medication Therapy Management Pharmacist  [facility]  Phone: [phone]    "

## 2025-05-01 SDOH — HEALTH STABILITY: PHYSICAL HEALTH: ON AVERAGE, HOW MANY MINUTES DO YOU ENGAGE IN EXERCISE AT THIS LEVEL?: 0 MIN

## 2025-05-01 SDOH — HEALTH STABILITY: PHYSICAL HEALTH: ON AVERAGE, HOW MANY DAYS PER WEEK DO YOU ENGAGE IN MODERATE TO STRENUOUS EXERCISE (LIKE A BRISK WALK)?: 0 DAYS

## 2025-05-01 ASSESSMENT — SOCIAL DETERMINANTS OF HEALTH (SDOH): HOW OFTEN DO YOU GET TOGETHER WITH FRIENDS OR RELATIVES?: THREE TIMES A WEEK

## 2025-05-05 ENCOUNTER — MYC MEDICAL ADVICE (OUTPATIENT)
Dept: FAMILY MEDICINE | Facility: CLINIC | Age: 40
End: 2025-05-05

## 2025-05-05 ENCOUNTER — OFFICE VISIT (OUTPATIENT)
Dept: FAMILY MEDICINE | Facility: CLINIC | Age: 40
End: 2025-05-05
Payer: COMMERCIAL

## 2025-05-05 VITALS
TEMPERATURE: 97.8 F | BODY MASS INDEX: 47.32 KG/M2 | HEIGHT: 65 IN | HEART RATE: 86 BPM | RESPIRATION RATE: 20 BRPM | OXYGEN SATURATION: 99 % | WEIGHT: 284 LBS | DIASTOLIC BLOOD PRESSURE: 85 MMHG | SYSTOLIC BLOOD PRESSURE: 136 MMHG

## 2025-05-05 DIAGNOSIS — Z86.19 HISTORY OF COLD SORES: Primary | ICD-10-CM

## 2025-05-05 DIAGNOSIS — Z12.4 CERVICAL CANCER SCREENING: ICD-10-CM

## 2025-05-05 DIAGNOSIS — L40.50 PSORIATIC ARTHRITIS (H): ICD-10-CM

## 2025-05-05 DIAGNOSIS — E66.813 CLASS 3 OBESITY (H): ICD-10-CM

## 2025-05-05 DIAGNOSIS — Z00.00 ROUTINE GENERAL MEDICAL EXAMINATION AT A HEALTH CARE FACILITY: Primary | ICD-10-CM

## 2025-05-05 DIAGNOSIS — Z12.31 VISIT FOR SCREENING MAMMOGRAM: ICD-10-CM

## 2025-05-05 DIAGNOSIS — Z13.6 SCREENING FOR CARDIOVASCULAR CONDITION: ICD-10-CM

## 2025-05-05 LAB
ALBUMIN SERPL BCG-MCNC: 4.2 G/DL (ref 3.5–5.2)
ALT SERPL W P-5'-P-CCNC: 21 U/L (ref 0–50)
AST SERPL W P-5'-P-CCNC: 31 U/L (ref 0–45)
BASOPHILS # BLD AUTO: 0.1 10E3/UL (ref 0–0.2)
BASOPHILS NFR BLD AUTO: 1 %
CHOLEST SERPL-MCNC: 174 MG/DL
CREAT SERPL-MCNC: 0.96 MG/DL (ref 0.51–0.95)
CRP SERPL-MCNC: 40.6 MG/L
EGFRCR SERPLBLD CKD-EPI 2021: 76 ML/MIN/1.73M2
EOSINOPHIL # BLD AUTO: 0.1 10E3/UL (ref 0–0.7)
EOSINOPHIL NFR BLD AUTO: 1 %
ERYTHROCYTE [DISTWIDTH] IN BLOOD BY AUTOMATED COUNT: 13.3 % (ref 10–15)
ERYTHROCYTE [SEDIMENTATION RATE] IN BLOOD BY WESTERGREN METHOD: 21 MM/HR (ref 0–20)
FASTING STATUS PATIENT QL REPORTED: NO
HCT VFR BLD AUTO: 39.1 % (ref 35–47)
HDLC SERPL-MCNC: 55 MG/DL
HGB BLD-MCNC: 13.2 G/DL (ref 11.7–15.7)
IMM GRANULOCYTES # BLD: 0 10E3/UL
IMM GRANULOCYTES NFR BLD: 0 %
LDLC SERPL CALC-MCNC: 101 MG/DL
LYMPHOCYTES # BLD AUTO: 2.3 10E3/UL (ref 0.8–5.3)
LYMPHOCYTES NFR BLD AUTO: 24 %
MCH RBC QN AUTO: 30.6 PG (ref 26.5–33)
MCHC RBC AUTO-ENTMCNC: 33.8 G/DL (ref 31.5–36.5)
MCV RBC AUTO: 91 FL (ref 78–100)
MONOCYTES # BLD AUTO: 0.5 10E3/UL (ref 0–1.3)
MONOCYTES NFR BLD AUTO: 5 %
NEUTROPHILS # BLD AUTO: 6.7 10E3/UL (ref 1.6–8.3)
NEUTROPHILS NFR BLD AUTO: 69 %
NONHDLC SERPL-MCNC: 119 MG/DL
PLATELET # BLD AUTO: 353 10E3/UL (ref 150–450)
RBC # BLD AUTO: 4.32 10E6/UL (ref 3.8–5.2)
TRIGL SERPL-MCNC: 91 MG/DL
WBC # BLD AUTO: 9.6 10E3/UL (ref 4–11)

## 2025-05-05 PROCEDURE — 80061 LIPID PANEL: CPT

## 2025-05-05 PROCEDURE — 84450 TRANSFERASE (AST) (SGOT): CPT

## 2025-05-05 PROCEDURE — 87624 HPV HI-RISK TYP POOLED RSLT: CPT

## 2025-05-05 PROCEDURE — 82565 ASSAY OF CREATININE: CPT

## 2025-05-05 PROCEDURE — 82040 ASSAY OF SERUM ALBUMIN: CPT

## 2025-05-05 PROCEDURE — 90471 IMMUNIZATION ADMIN: CPT

## 2025-05-05 PROCEDURE — 90677 PCV20 VACCINE IM: CPT

## 2025-05-05 PROCEDURE — 99396 PREV VISIT EST AGE 40-64: CPT | Mod: 25

## 2025-05-05 PROCEDURE — 36415 COLL VENOUS BLD VENIPUNCTURE: CPT

## 2025-05-05 PROCEDURE — 86140 C-REACTIVE PROTEIN: CPT

## 2025-05-05 PROCEDURE — 84460 ALANINE AMINO (ALT) (SGPT): CPT

## 2025-05-05 PROCEDURE — 3079F DIAST BP 80-89 MM HG: CPT

## 2025-05-05 PROCEDURE — 85025 COMPLETE CBC W/AUTO DIFF WBC: CPT

## 2025-05-05 PROCEDURE — 85652 RBC SED RATE AUTOMATED: CPT

## 2025-05-05 PROCEDURE — 3075F SYST BP GE 130 - 139MM HG: CPT

## 2025-05-05 PROCEDURE — 1125F AMNT PAIN NOTED PAIN PRSNT: CPT

## 2025-05-05 ASSESSMENT — PAIN SCALES - GENERAL: PAINLEVEL_OUTOF10: SEVERE PAIN (7)

## 2025-05-05 NOTE — PATIENT INSTRUCTIONS
At [facility], we strive to deliver an exceptional experience to you, every time we see you. If you receive a survey, please let us know what we are doing well and/or what we could improve upon, as we do value your feedback.  If you have MyChart, you can expect to receive results automatically within 24 hours of their completion.  Your provider will send a note interpreting your results as well.   If you do not have MyChart, you should receive your results in about a week by mail.    Your care team:                            Family Medicine Internal Medicine   MD Dakota Corcoran, MD Cierra Witt, MD Farhan Ahn, MD Jennifer Ross, PA-C    Ghassan Schuler, MD Pediatrics   Pebbles Meraz, MD Monse Vitale, MD Ute Benavidez, APRN CNP [facility] CNP   Shekhar Peralta, MD Ayana Desai, MD Isabel Yepez, CNP     Francesca Benavides, PA-C Same-Day Provider (No follow-up visits)   EVIE Hurtado, MARICHUY Brothers, PA-C    Debbie Zaidi PA-C     Clinic hours: Monday - Thursday 7 am-6 pm; Fridays 7 am-5 pm.   Urgent care: Monday - Friday 10 am- 8 pm; Saturday and Sunday 9 am-5 pm.    Clinic: [phone]       [facility]: Monday - Thursday 8 am - 7 pm; Friday 8 am - 6 pm  [facility]: [phone]     Patient Education   Preventive Care Advice   This is general advice given by our system to help you stay healthy. However, your care team may have specific advice just for you. Please talk to your care team about your preventive care needs.  Nutrition  Eat 5 or more servings of fruits and vegetables each day.  Try wheat bread, brown rice and whole grain pasta (instead of white bread, rice, and pasta).  Get enough calcium and vitamin D. Check the label on foods and aim for 100% of the RDA (recommended daily allowance).  Lifestyle  Exercise at least 150 minutes each week  (30  minutes a day, 5 days a week).  Do muscle strengthening activities 2 days a week. These help control your weight and prevent disease.  No smoking.  Wear sunscreen to prevent skin cancer.  Have a dental exam and cleaning every 6 months.  Yearly exams  See your health care team every year to talk about:  Any changes in your health.  Any medicines your care team has prescribed.  Preventive care, family planning, and ways to prevent chronic diseases.  Shots (vaccines)   HPV shots (up to age 26), if you've never had them before.  Hepatitis B shots (up to age 59), if you've never had them before.  COVID-19 shot: Get this shot when it's due.  Flu shot: Get a flu shot every year.  Tetanus shot: Get a tetanus shot every 10 years.  Pneumococcal, hepatitis A, and RSV shots: Ask your care team if you need these based on your risk.  Shingles shot (for age 50 and up)  General health tests  Diabetes screening:  Starting at age 35, Get screened for diabetes at least every 3 years.  If you are younger than age 35, ask your care team if you should be screened for diabetes.  Cholesterol test: At age 39, start having a cholesterol test every 5 years, or more often if advised.  Bone density scan (DEXA): At age 50, ask your care team if you should have this scan for osteoporosis (brittle bones).  Hepatitis C: Get tested at least once in your life.  STIs (sexually transmitted infections)  Before age 24: Ask your care team if you should be screened for STIs.  After age 24: Get screened for STIs if you're at risk. You are at risk for STIs (including HIV) if:  You are sexually active with more than one person.  You don't use condoms every time.  You or a partner was diagnosed with a sexually transmitted infection.  If you are at risk for HIV, ask about PrEP medicine to prevent HIV.  Get tested for HIV at least once in your life, whether you are at risk for HIV or not.  Cancer screening tests  Cervical cancer screening: If you have a cervix,  begin getting regular cervical cancer screening tests starting at age 21.  Breast cancer scan (mammogram): If you've ever had breasts, begin having regular mammograms starting at age 40. This is a scan to check for breast cancer.  Colon cancer screening: It is important to start screening for colon cancer at age 45.  Have a colonoscopy test every 10 years (or more often if you're at risk) Or, ask your provider about stool tests like a FIT test every year or Cologuard test every 3 years.  To learn more about your testing options, visit:   .  For help making a decision, visit:   https://bit.ly/oq39675.  Prostate cancer screening test: If you have a prostate, ask your care team if a prostate cancer screening test (PSA) at age 55 is right for you.  Lung cancer screening: If you are a current or former smoker ages 50 to 80, ask your care team if ongoing lung cancer screenings are right for you.  For informational purposes only. Not to replace the advice of your health care provider. Copyright   2023 [employer]. All rights reserved. Clinically reviewed by the [facility]itions Program. [employer] 404026 - REV 01/24.

## 2025-05-05 NOTE — NURSING NOTE
Prior to immunization administration, verified patients identity using patient s name and date of birth. Please see Immunization Activity for additional information.     Screening Questionnaire for Adult Immunization    Are you sick today?   No   Do you have allergies to medications, food, a vaccine component or latex?   No   Have you ever had a serious reaction after receiving a vaccination?   No   Do you have a long-term health problem with heart, lung, kidney, or metabolic disease (e.g., diabetes), asthma, a blood disorder, no spleen, complement component deficiency, a cochlear implant, or a spinal fluid leak?  Are you on long-term aspirin therapy?   No   Do you have cancer, leukemia, HIV/AIDS, or any other immune system problem?   No   Do you have a parent, brother, or sister with an immune system problem?   No   In the past 3 months, have you taken medications that affect  your immune system, such as prednisone, other steroids, or anticancer drugs; drugs for the treatment of rheumatoid arthritis, Crohn s disease, or psoriasis; or have you had radiation treatments?   No   Have you had a seizure, or a brain or other nervous system problem?   No   During the past year, have you received a transfusion of blood or blood    products, or been given immune (gamma) globulin or antiviral drug?   No   For women: Are you pregnant or is there a chance you could become       pregnant during the next month?   No   Have you received any vaccinations in the past 4 weeks?   No     Immunization questionnaire answers were all negative.      Patient instructed to remain in clinic for 15 minutes afterwards, and to report any adverse reactions.     Screening performed by Keke Mcneal MA on 5/5/2025 at 5:10 PM.

## 2025-05-05 NOTE — PROGRESS NOTES
"Preventive Care Visit  [facility]  EVIE De La Fuente CNP, Family Medicine  May 5, 2025    Assessment & Plan     Routine general medical examination at a health care facility  - Health maintenance and lifestyle reviewed. Updated medical and family history.  - Follow up in one year or sooner as needed.   - REVIEW OF HEALTH MAINTENANCE PROTOCOL ORDERS  - PRIMARY CARE FOLLOW-UP SCHEDULING  - Pneumococcal 20 Valent Conjugate (Prevnar 20)    Psoriatic arthritis (H)  - Managed by rheumatology.  - ESR  - CBC with platelets differential  - CRP inflammation  - AST  - ALT  - Albumin level  - Creatinine    Class 3 obesity (H)  - Follows with weight management. Tolerating phentermine without adverse effects.    Screening for cardiovascular condition  - Lipid panel reflex to direct LDL Non-fasting    Visit for screening mammogram  - Discussed screening guidelines.  - MA Screening Bilateral w/ Barry    Cervical cancer screening  - Discussed screening guidelines and pap completed.   - HPV and Gynecologic Cytology Panel - Recommended Age 30 - 65 Years  - Gynecologic Cytology (PAP)    Patient has been advised of split billing requirements and indicates understanding: Yes    BMI  Estimated body mass index is 47.26 kg/m  as calculated from the following:    Height as of this encounter: 1.651 m (5' 5\").    Weight as of this encounter: 128.8 kg (284 lb).   Weight management plan: Discussed healthy diet and exercise guidelines    Counseling  Appropriate preventive services were addressed with this patient via screening, questionnaire, or discussion as appropriate for fall prevention, nutrition, physical activity, Tobacco-use cessation, social engagement, weight loss and cognition.  Checklist reviewing preventive services available has been given to the patient.  Reviewed patient's diet, addressing concerns and/or questions.     Follow-up    Follow-up Visit   Expected date:  May 05, 2026 (Approximate)      " Follow Up Appointment Details:     Follow-up with whom?: PCP    Follow-Up for what?: Adult Preventive    How?: In Person               Marco Antonio Vargas is a 39 year old, presenting for the following:  Physical        5/5/2025     4:12 PM   Additional Questions   Roomed by Keke   Accompanied by Self      HPI  Presents for annual physical. No major concerns. Psoriatic arthritis managed by rheumatology, joint pain has been impacting activities.      Advance Care Planning  Discussed advance care planning with patient; informed AVS has link to [facility] [employer].        5/1/2025   General Health   How would you rate your overall physical health? Good   Feel stress (tense, anxious, or unable to sleep) Only a little   (!) STRESS CONCERN      5/1/2025   Nutrition   Three or more servings of calcium each day? Yes   Diet: Regular (no restrictions)   How many servings of fruit and vegetables per day? (!) 0-1   How many sweetened beverages each day? 0-1         5/1/2025   Exercise   Days per week of moderate/strenous exercise 0 days   Average minutes spent exercising at this level 0 min   (!) EXERCISE CONCERN      5/1/2025   Social Factors   Frequency of gathering with friends or relatives Three times a week   Worry food won't last until get money to buy more No   Food not last or not have enough money for food? No   Do you have housing? (Housing is defined as stable permanent housing and does not include staying outside in a car, in a tent, in an abandoned building, in an overnight shelter, or couch-surfing.) Yes   Are you worried about losing your housing? No   Lack of transportation? No   Unable to get utilities (heat,electricity)? No         5/1/2025   Dental   Dentist two times every year? Yes     Today's PHQ-2 Score:       3/20/2025     9:48 AM   PHQ-2 ( 1999 Pf[employer])   Q1: Little interest or pleasure in doing things 0   Q2: Feeling down, depressed or hopeless 0   PHQ-2 Score 0         5/1/2025   Substance Use   Alcohol  "more than 3/day or more than 7/wk No   Do you use any other substances recreationally? No     Social History     Tobacco Use    Smoking status: Never     Passive exposure: Never    Smokeless tobacco: Never   Vaping Use    Vaping status: Never Used   Substance Use Topics    Alcohol use: Yes     Comment: 0-2 per week    Drug use: No     Mammogram Screening - Mammogram every 1-2 years updated in Health Maintenance based on mutual decision making        5/1/2025   STI Screening   New sexual partner(s) since last STI/HIV test? No     History of abnormal Pap smear: No - age 30- 64 PAP with HPV every 5 years recommended     ASCVD Risk   The ASCVD Risk score (Mitra LUI, et al., 2019) failed to calculate for the following reasons:    Cannot find a previous HDL lab    Cannot find a previous total cholesterol lab        5/1/2025   Contraception/Family Planning   Questions about contraception or family planning No     Reviewed and updated as needed this visit by Provider   Tobacco   Meds   Med Hx   Fam Hx  Soc Hx Sexual Activity        Review of Systems  Constitutional, HEENT, cardiovascular, pulmonary, gi and gu systems are negative, except as otherwise noted.     Objective    Exam  /85   Pulse 86   Temp 97.8  F (36.6  C) (Temporal)   Resp 20   Ht 1.651 m (5' 5\")   Wt 128.8 kg (284 lb)   LMP 05/04/2025   SpO2 99%   BMI 47.26 kg/m     Estimated body mass index is 47.26 kg/m  as calculated from the following:    Height as of this encounter: 1.651 m (5' 5\").    Weight as of this encounter: 128.8 kg (284 lb).    Physical Exam  GENERAL: alert and no distress  EYES: Eyes grossly normal to inspection, PERRL and conjunctivae and sclerae normal  HENT: ear canals and TM's normal, nose and mouth without ulcers or lesions  NECK: no adenopathy, no asymmetry, masses, or scars  RESP: lungs clear to auscultation - no rales, rhonchi or wheezes  CV: regular rate and rhythm, normal S1 S2, no S3 or S4, no murmur, click " or rub, no peripheral edema  ABDOMEN: soft, nontender, no hepatosplenomegaly, no masses and bowel sounds normal   (female): normal female external genitalia, normal urethral meatus, normal vaginal mucosa  MS: no gross musculoskeletal defects noted, no edema  SKIN: no suspicious lesions or rashes  NEURO: Normal strength and tone, mentation intact and speech normal  PSYCH: mentation appears normal, affect normal/bright    Signed Electronically by: EVIE De La Fuente CNP

## 2025-05-06 ENCOUNTER — TRANSFERRED RECORDS (OUTPATIENT)
Dept: HEALTH INFORMATION MANAGEMENT | Facility: CLINIC | Age: 40
End: 2025-05-06
Payer: COMMERCIAL

## 2025-05-07 ENCOUNTER — RESULTS FOLLOW-UP (OUTPATIENT)
Dept: OBGYN | Facility: CLINIC | Age: 40
End: 2025-05-07

## 2025-05-07 LAB
HPV HR 12 DNA CVX QL NAA+PROBE: NEGATIVE
HPV16 DNA CVX QL NAA+PROBE: NEGATIVE
HPV18 DNA CVX QL NAA+PROBE: NEGATIVE
HUMAN PAPILLOMA VIRUS FINAL DIAGNOSIS: NORMAL

## 2025-05-07 RX ORDER — VALACYCLOVIR HYDROCHLORIDE 1 G/1
2000 TABLET, FILM COATED ORAL 2 TIMES DAILY
Qty: 4 TABLET | Refills: 0 | Status: SHIPPED | OUTPATIENT
Start: 2025-05-07 | End: 2025-05-08

## 2025-05-12 DIAGNOSIS — L40.50 PSORIATIC ARTHRITIS (H): ICD-10-CM

## 2025-05-14 NOTE — TELEPHONE ENCOUNTER
Patient completed labs as directed on 5/5/25 and it is appropriate to continue Enbrel. Reordering Enbrel 50 mg subcutaneous injections every 7 days per [facility] pharmacist CPA. She is scheduled to meet with rheumatologist Fatmata Jackson MD on 5/27.     Jacoby Sheikh, PharmD  Medication Therapy Management Pharmacist  [facility]

## 2025-05-20 ENCOUNTER — TELEPHONE (OUTPATIENT)
Dept: RHEUMATOLOGY | Facility: CLINIC | Age: 40
End: 2025-05-20
Payer: COMMERCIAL

## 2025-05-20 NOTE — TELEPHONE ENCOUNTER
Rheumatology Clinic RN Patient Call    SITUATION:                             BACKGROUND:   Patient is being treated for:  PSA/PSO     Current Rheumatology medications:  Enbrel, Otezla  Last Rheumatology follow up: 9/9/24    Plan:   Plan:    1) Labs every 3-4 months     2) Continue Enbrel 50 mg weekly, apremilast 30 mg twice a day      3) See me back in 5-6 months, sooner with questions   Next Scheduled follow up: 5/27/25        ASSESSMENT:   Call to Alicia,  She states she is overall feeling good. Notes that knee inflammation is trending down.     Per 5/6 [facility] note:      Per [facility] note she continues to opt for conservative measures to manage knee osteoarthritis.  PLAN:    No further interventions at this time.

## 2025-05-27 ENCOUNTER — OFFICE VISIT (OUTPATIENT)
Dept: RHEUMATOLOGY | Facility: CLINIC | Age: 40
End: 2025-05-27
Payer: COMMERCIAL

## 2025-05-27 ENCOUNTER — RESULTS FOLLOW-UP (OUTPATIENT)
Dept: RHEUMATOLOGY | Facility: CLINIC | Age: 40
End: 2025-05-27

## 2025-05-27 VITALS
OXYGEN SATURATION: 100 % | WEIGHT: 282 LBS | BODY MASS INDEX: 46.93 KG/M2 | SYSTOLIC BLOOD PRESSURE: 124 MMHG | TEMPERATURE: 98.5 F | DIASTOLIC BLOOD PRESSURE: 90 MMHG | HEART RATE: 84 BPM

## 2025-05-27 DIAGNOSIS — Z79.899 HIGH RISK MEDICATION USE: ICD-10-CM

## 2025-05-27 DIAGNOSIS — D84.9 IMMUNOSUPPRESSED STATUS: ICD-10-CM

## 2025-05-27 DIAGNOSIS — L40.50 PSORIATIC ARTHRITIS (H): Primary | ICD-10-CM

## 2025-05-27 DIAGNOSIS — M17.0 PRIMARY OSTEOARTHRITIS OF BOTH KNEES: ICD-10-CM

## 2025-05-27 LAB — CRP SERPL-MCNC: 12.7 MG/L

## 2025-05-27 ASSESSMENT — PAIN SCALES - GENERAL: PAINLEVEL_OUTOF10: MILD PAIN (3)

## 2025-05-27 NOTE — PROGRESS NOTES
"RHEUMATOLOGY CLINIC FOLLOW UP    Date of service: 5/27/2025  PCP: Isabel Yepez    CC:   Chief Complaint   Patient presents with    RECHECK     Psoriatic arthritis    New Patient     Patient of Radha Vargas is 39 year old  has a past medical history of Acute pancreatitis (2016), GERD (gastroesophageal reflux disease), Infective otitis externa, right (08/29/2018), Irritable bowel syndrome, Lumbar radiculopathy, Psoriasis, and Vitamin D deficiency. is here for follow up for PSA/PSO (dx 2020)     HISTORY OF PRESENT ILLNESS:  Current treatment:   Enbrel 50 mg weekly (8/23) ,   Otezla 30 mg BID (8/21)     Past Rheum tx:  SSZ-rash  Humira 40 mg every 2 weeks (2/22)-developed drug tequila and no drug level    Interval hx:  Reports overall well controlled PsA but she is having severe b/l knee pain that is on going - follows with [facility] for this - had joint injections done and found to have severe DJD -- she was told she is candidate for TKA in future once injections no longer effective. She states today she is doing well overall - she denies any new flares of other areas of joint pain and skin is very clear for many years (previous in scalp).        Historic notes:  9/09/2024:    PSA:  Hands are 90 % improved on Enbrel, R knee is 95% improved. Doing well.      PSO: Improved.      ROS: Is on Phentermine,  lost 48 lbs      HPI:  Pt had thrombocytosis and neutrophilia and found to be likely related to inflammatory process, was having joint pain and rash and found to have PSA/PSO.    LOV 7/5/2022 with Dr. Alejandro:   \"Psoriatic arthritis: Pain in her joints started in 2020 in her right leg. Pain radiated from right groin into the thigh.  MRI lumbar spine showed mild DDD. She had bilateral hip injection which helped for a week. In 4/2021 she noticed sausage like swelling of her right third, fourth toes.  She had pain and tenderness over left wrist, right ankle and right knee and swelling over her " "wrist and ankles.  Also had numbness in the left thumb, index and middle finger. She has hx of Psoriasis.      Her clinical history, physical exam is consistent with psoriatic arthritis.  She has dactylitis, enthesitis in the form of plantar fasciitis.  She has synovitis in left wrist, bilateral ankles.     She has negative rheumatoid factor, anti-CCP, HLA-B27 genotype.  X-ray of bilateral hand, wrist, feet showed no erosive disease. She was given prednisone taper and responded very well.  Otezla was started in 8/2021 for psoriatic arthritis.  Denies any side effects.  It cleared psoriasis, it is 70% better.  SSZ was added due to persistent joint pains in 1/2022 but developed skin rash due to it. Later Humira was started in 2/2022. She has noted marked improvement on Humira.       Will continue Humira 40 mg subcutaneous once every 2 weeks.      Psoriasis: Psoriasis is better on Otezla and Humira.    Lumbar degenerative disc disease: She also has lumbar degenerative disc disease.  Reports chronic low back pain and follows with orthopedic.   Right knee Osteoarthritis : She has OA in her right knee and is seeing [facility] for it. She received intra articular steroid injection in her right knee in 2/2022 which helped.    Morbid obesity: She is morbidly obese.  Once overall inflammation is under control she should focus on weight loss, physical exercise, pool therapy which will help her long-term.\"     Thrombocytosis and neutrophilia work up 5/25/2021 LOV with Dr. Szymanski  \"I discussed with her that I suspect that the elevated white blood cell count is due to neutrophils and the mildly elevated platelet count is most likely reactive to some sort of inflammatory condition going on.  It is quite possible that this is connected to the joint pains that she is having.  I wonder whether she has a seronegative arthritis like psoriatic arthritis underlying.  We will know when she has the rheumatology work-up.   " "  Meanwhile we will go for a work-up to make sure that she does not have an underlying myeloproliferative disorder that is responsible for the neutrophilia and thrombocytosis.  She was agreeable to doing a systematic work-up.  If an underlying hematological disorder is ruled out, then she need not worry about that again.   Today we will obtain the following labs: Ferritin, TIBC panel, BCR/ABL qualitative PCR, JAK2 mutation with reflex to MPL and PERNELL R mutations.   I discussed with her that once I have the results of the gene tests available, I will give her a call.  I asked her to expect a call in about 2 weeks time.\"      Vaccinations: Vaccinations reviewed with MsMunir Smith. Risks and benefits of vaccinations were discussed.  - Influenza: encouraged yearly vaccination. Has 2022  - [id number]: prescription sent  - Mhncslela14: to receive 8 weeks after iaffblv54 is administered  - Zostavax: after age 50  - COVID : 12/21/21, 2/5/21, 1/15/21, 10/11/2022      Tolerating medications well. No recent infections. No falls or fractures. No recent  hospitalizations.    I have reviewed the medical records and performed a history and exam.    ASSESSMENT/PLAN:    (L40.50) Psoriatic arthritis (H)  (primary encounter diagnosis)  Comment: Well controlled disease overall, clear skin, no active joint pains consistent with synovitis or dactylitis, no active enthesitis.   Plan: CRP inflammation  -Enbrel 50 mg weekly   -Otezla 30 mg BID  -Will repeat CRP today to establish baseline without knee pain flare      (Z79.899) High risk medication use  Comment: due to Enbrel and Otezla   Plan: Alkaline phosphatase, Creatinine, CBC with         Platelets & Differential, Erythrocyte         sedimentation rate auto, AST, CRP inflammation,        ALT  -Routine labs every 6mo for monitoring       (D84.9) Immunosuppressed status  Comment: due to Enbrel and Otezla  Plan:   Baseline screening for tuberculosis and hepatitis B/C has been completed [or is " pending] prior to initiation of  therapy, given the risk of infection reactivation with immunosuppressive treatment.  -Immunosuppressive therapy, vaccine counseling provided, continue close  follow up with PCP for age appropriate vaccines  -Given the immunosuppressive effects of  therapy, it is recommended to hold treatment for one dosing interval prior to major surgical procedures and to resume postoperatively typically 2 weeks postoperative and once wound healing is satisfactory and no evidence of infection is present as cleared by surgeon.       (M17.0) Primary osteoarthritis of both knees  Comment:   Plan: Continue to follow with [place]      -High risk medication monitoring labs every 6 months per protocol    Return to clinic in 6 Months, sooner if having any problems.      PAST MEDICAL HISTORY  Surgical, family and social history: reviewed with patient today and are  unchanged.    MEDICATIONS  Current Outpatient Medications   Medication Sig Dispense Refill    acetaminophen (TYLENOL) 500 MG tablet       apremilast (OTEZLA) 30 MG tablet Take 1 tablet (30 mg) by mouth 2 times daily. Hold for signs of infection, and seek medical attention 180 tablet 3    Calcium Carbonate-Vitamin D 600-10 MG-MCG TABS       Cholecalciferol (VITAMIN D) 2000 units tablet Take 1 tablet by mouth daily      etanercept (ENBREL SURECLICK) 50 MG/ML autoinjector Inject 50 mg subcutaneously every 7 days. 4 mL 1    ibuprofen (ADVIL/MOTRIN) 200 MG tablet       Loratadine (CLARITIN PO) Take  by mouth.      meclizine 25 MG CHEW Take 25 mg by mouth as needed.      multivitamin w/minerals (THERA-VIT-M) tablet       omeprazole (PRILOSEC) 20 MG DR capsule Take 1 capsule (20 mg) by mouth 2 times daily 60 capsule 2    phentermine 30 MG capsule Take 1 capsule (30 mg) by mouth every morning. 90 capsule 0    valACYclovir (VALTREX) 1000 mg tablet Take 2 tablets (2,000 mg) by mouth 2 times daily for 1 day. 4 tablet 0       ALLERGIES:  Allergies    Allergen Reactions    Sulfasalazine Rash    Tirzepatide Swelling and Rash       REVIEW OF SYSTEMS: I have reviewed 14 organ systems and all negative  except as per HPI.    PHYSICAL EXAM  BP (!) 124/90 (BP Location: Left arm, Patient Position: Sitting, Cuff Size: Adult Large)   Pulse 84   Temp 98.5  F (36.9  C) (Oral)   Wt 127.9 kg (282 lb)   LMP 05/04/2025 (Approximate)   SpO2 100%   BMI 46.93 kg/m   : reviewed    GENERAL: Pleasant and cooperative, in no distress. Alert and Oriented x 3.  SKIN:  no psoriasis. no Raynaud's.  MUSCULOSKELETAL:  Shoulders: Normal ROM.  Elbows:  No extension deficits or contractures. No tophi or rheumatoid  nodules.  Wrists:  No Tenosynovitis dorsum of wrist. Full ROM.  Hands:  No Synovitis noted MCP, PIP joints. Good .  Knees: b/l crepitus.  No swelling or effusion, or bulge sign.      LABS: Pending / reviewed with the patient during the visit.    Last three Labs:  Hemoglobin   Date Value Ref Range Status   05/05/2025 13.2 11.7 - 15.7 g/dL Final   01/14/2025 14.7 11.7 - 15.7 g/dL Final   11/19/2024 14.1 11.7 - 15.7 g/dL Final   07/08/2021 13.0 11.7 - 15.7 g/dL Final     Urea Nitrogen   Date Value Ref Range Status   03/23/2025 19.1 6.0 - 20.0 mg/dL Final   01/19/2024 14.1 6.0 - 20.0 mg/dL Final   02/10/2023 13.3 6.0 - 20.0 mg/dL Final   04/21/2021 15 8 - 22 mg/dL Final   01/08/2020 17 8 - 22 mg/dL Final     Sed Rate   Date Value Ref Range Status   07/08/2021 32 (H) 0 - 20 mm/h Final     Erythrocyte Sedimentation Rate   Date Value Ref Range Status   05/05/2025 21 (H) 0 - 20 mm/hr Final   01/14/2025 17 0 - 20 mm/hr Final   11/19/2024 17 0 - 20 mm/hr Final     CRP Inflammation   Date Value Ref Range Status   03/09/2023 24.9 (H) 0.0 - 8.0 mg/L Final   11/07/2022 18.8 (H) 0.0 - 8.0 mg/L Final   06/30/2022 30.9 (H) 0.0 - 8.0 mg/L Final   07/08/2021 31.8 (H) 0.0 - 8.0 mg/L Final     CRP   Date Value Ref Range Status   01/10/2020 2.2 (H) 0.0 - 0.8 mg/dL Final   01/08/2020 3.2 (H) 0.0  - 0.8 mg/dL Final     AST   Date Value Ref Range Status   05/05/2025 31 0 - 45 U/L Final   01/14/2025 21 0 - 45 U/L Final   11/19/2024 23 0 - 45 U/L Final   07/08/2021 12 0 - 45 U/L Final     Albumin   Date Value Ref Range Status   05/05/2025 4.2 3.5 - 5.2 g/dL Final   01/14/2025 4.2 3.5 - 5.2 g/dL Final   11/19/2024 4.0 3.5 - 5.2 g/dL Final   03/09/2023 3.5 3.4 - 5.0 g/dL Final   11/07/2022 3.6 3.4 - 5.0 g/dL Final   06/30/2022 3.9 3.4 - 5.0 g/dL Final   07/08/2021 3.5 3.4 - 5.0 g/dL Final     Alkaline Phosphatase   Date Value Ref Range Status   01/19/2024 85 40 - 150 U/L Final     Comment:     Reference intervals for this test were updated on 11/14/2023 to more accurately reflect our healthy population. There may be differences in the flagging of prior results with similar values performed with this method. Interpretation of those prior results can be made in the context of the updated reference intervals.   02/10/2023 97 35 - 104 U/L Final     ALT   Date Value Ref Range Status   05/05/2025 21 0 - 50 U/L Final   01/14/2025 21 0 - 50 U/L Final   11/19/2024 17 0 - 50 U/L Final   07/08/2021 26 0 - 50 U/L Final         IMAGING: Reviewed     Narrative & Impression   EXAM: XR SACROILIAC JOINT 1/2 VW  7/8/2021 11:42 AM       HISTORY: Evaluate for Sacroilitis or Sclerosis; Psoriatic arthritis  (H)     COMPARISON: None     FINDINGS: AP and bilateral oblique views of the sacroiliac joints.     No acute osseous abnormality. No radiographic evidence of inflammatory  cellulitis. No erosion or ankylosis. Accessory left-sided lumbosacral  facet with elongation of the left L5 transverse process. Elongated  right L5 transverse processes well with possible smaller  pseudoarticulation.     Minimal degenerative changes of the hips. Soft tissues unremarkable.                                                                       IMPRESSION:      1. No radiographic evidence of inflammatory sacroiliitis.     2. L5-S1 pseudoarticulation  bilaterally, left greater than right.          An after visit summary was given to the patient.  Thank you.    Fatmata Jackson MD  [facility]

## 2025-05-27 NOTE — NURSING NOTE
Alicia Smith's goals for this visit include:   Chief Complaint   Patient presents with    RECHECK     Psoriatic arthritis    New Patient     Patient of Radha     She requests these members of her care team be copied on today's visit information: yes    PCP: Isabel Yepez    Referring Provider:  Radha Gong NP  No address on file    BP (!) 124/90 (BP Location: Left arm, Patient Position: Sitting, Cuff Size: Adult Large)   Pulse 84   Temp 98.5  F (36.9  C) (Oral)   Wt 127.9 kg (282 lb)   LMP 05/04/2025 (Approximate)   SpO2 100%   BMI 46.93 kg/m      Do you need any medication refills at today's visit? No    Noe Cosby, CMA

## 2025-07-11 DIAGNOSIS — L40.50 PSORIATIC ARTHRITIS (H): ICD-10-CM

## 2025-09-04 ENCOUNTER — VIRTUAL VISIT (OUTPATIENT)
Dept: SURGERY | Facility: CLINIC | Age: 40
End: 2025-09-04
Payer: COMMERCIAL

## 2025-09-04 VITALS — WEIGHT: 271 LBS | BODY MASS INDEX: 45.1 KG/M2

## 2025-09-04 DIAGNOSIS — E66.813 CLASS 3 OBESITY (H): Primary | ICD-10-CM
